# Patient Record
Sex: FEMALE | Employment: UNEMPLOYED | URBAN - METROPOLITAN AREA
[De-identification: names, ages, dates, MRNs, and addresses within clinical notes are randomized per-mention and may not be internally consistent; named-entity substitution may affect disease eponyms.]

---

## 2018-07-26 ENCOUNTER — OFFICE VISIT (OUTPATIENT)
Dept: INTERNAL MEDICINE CLINIC | Facility: CLINIC | Age: 43
End: 2018-07-26
Payer: COMMERCIAL

## 2018-07-26 VITALS
RESPIRATION RATE: 16 BRPM | HEART RATE: 62 BPM | HEIGHT: 65 IN | DIASTOLIC BLOOD PRESSURE: 76 MMHG | TEMPERATURE: 99 F | WEIGHT: 183.19 LBS | BODY MASS INDEX: 30.52 KG/M2 | SYSTOLIC BLOOD PRESSURE: 112 MMHG

## 2018-07-26 DIAGNOSIS — Z13.228 SCREENING FOR ENDOCRINE, NUTRITIONAL, METABOLIC AND IMMUNITY DISORDER: ICD-10-CM

## 2018-07-26 DIAGNOSIS — Z13.29 SCREENING FOR THYROID DISORDER: ICD-10-CM

## 2018-07-26 DIAGNOSIS — Z13.21 SCREENING FOR ENDOCRINE, NUTRITIONAL, METABOLIC AND IMMUNITY DISORDER: ICD-10-CM

## 2018-07-26 DIAGNOSIS — Z12.31 ENCOUNTER FOR SCREENING MAMMOGRAM FOR MALIGNANT NEOPLASM OF BREAST: ICD-10-CM

## 2018-07-26 DIAGNOSIS — B36.9 FUNGAL RASH OF TORSO: ICD-10-CM

## 2018-07-26 DIAGNOSIS — Z00.00 ROUTINE GENERAL MEDICAL EXAMINATION AT A HEALTH CARE FACILITY: Primary | ICD-10-CM

## 2018-07-26 DIAGNOSIS — Z13.29 SCREENING FOR ENDOCRINE, NUTRITIONAL, METABOLIC AND IMMUNITY DISORDER: ICD-10-CM

## 2018-07-26 DIAGNOSIS — Z13.0 ENCOUNTER FOR SCREENING FOR DISEASES OF THE BLOOD AND BLOOD-FORMING ORGANS AND CERTAIN DISORDERS INVOLVING THE IMMUNE MECHANISM: ICD-10-CM

## 2018-07-26 DIAGNOSIS — Z13.0 SCREENING FOR ENDOCRINE, NUTRITIONAL, METABOLIC AND IMMUNITY DISORDER: ICD-10-CM

## 2018-07-26 DIAGNOSIS — Z13.220 SCREENING FOR LIPID DISORDERS: ICD-10-CM

## 2018-07-26 PROCEDURE — 99386 PREV VISIT NEW AGE 40-64: CPT | Performed by: INTERNAL MEDICINE

## 2018-07-26 RX ORDER — CLOTRIMAZOLE AND BETAMETHASONE DIPROPIONATE 10; .64 MG/G; MG/G
CREAM TOPICAL
Qty: 60 G | Refills: 1 | Status: SHIPPED | OUTPATIENT
Start: 2018-07-26 | End: 2019-12-12

## 2018-07-26 NOTE — PROGRESS NOTES
Patient presents with:  Physical: cn room 4: physical       HPI:    Patient here for cpe without pap. Just moved from [id number] [facility] to here due to her 's job.  with 4 kids  Pap done last year and WNL.  S/p hysterectomy in 2016, ovaries still left a this patient.   Dental Visits:y  Pap:utd  Mammogram:due this year    Physical Exam  /76 (BP Location: Right arm, Patient Position: Sitting, Cuff Size: adult)   Pulse 62   Temp 98.5 °F (36.9 °C) (Oral)   Resp 16   Ht 65\"   Wt 183 lb 3.2 oz   BMI 30.49 Prescriptions Disp Refills    clotrimazole-betamethasone 1-0.05 % External Cream 60 g 1      Sig: Apply BID to rash for 2 weeks           Imaging & Consults:  [facility] SCREENING BILAT (CPT=77067)      Return if symptoms worsen or fail to improve.     There are n

## 2018-07-27 ENCOUNTER — LAB ENCOUNTER (OUTPATIENT)
Dept: LAB | Age: 43
End: 2018-07-27
Attending: INTERNAL MEDICINE
Payer: COMMERCIAL

## 2018-07-27 DIAGNOSIS — Z13.0 SCREENING FOR ENDOCRINE, NUTRITIONAL, METABOLIC AND IMMUNITY DISORDER: ICD-10-CM

## 2018-07-27 DIAGNOSIS — Z13.220 SCREENING FOR LIPID DISORDERS: ICD-10-CM

## 2018-07-27 DIAGNOSIS — Z13.228 SCREENING FOR ENDOCRINE, NUTRITIONAL, METABOLIC AND IMMUNITY DISORDER: ICD-10-CM

## 2018-07-27 DIAGNOSIS — Z13.0 ENCOUNTER FOR SCREENING FOR DISEASES OF THE BLOOD AND BLOOD-FORMING ORGANS AND CERTAIN DISORDERS INVOLVING THE IMMUNE MECHANISM: ICD-10-CM

## 2018-07-27 DIAGNOSIS — Z13.29 SCREENING FOR ENDOCRINE, NUTRITIONAL, METABOLIC AND IMMUNITY DISORDER: ICD-10-CM

## 2018-07-27 DIAGNOSIS — Z13.29 SCREENING FOR THYROID DISORDER: ICD-10-CM

## 2018-07-27 DIAGNOSIS — Z13.21 SCREENING FOR ENDOCRINE, NUTRITIONAL, METABOLIC AND IMMUNITY DISORDER: ICD-10-CM

## 2018-07-27 LAB
ALBUMIN SERPL-MCNC: 3.7 G/DL (ref 3.5–4.8)
ALBUMIN/GLOB SERPL: 0.9 {RATIO} (ref 1–2)
ALP LIVER SERPL-CCNC: 61 U/L (ref 37–98)
ALT SERPL-CCNC: 22 U/L (ref 14–54)
ANION GAP SERPL CALC-SCNC: 7 MMOL/L (ref 0–18)
AST SERPL-CCNC: 16 U/L (ref 15–41)
BASOPHILS # BLD AUTO: 0.04 X10(3) UL (ref 0–0.1)
BASOPHILS NFR BLD AUTO: 0.6 %
BILIRUB SERPL-MCNC: 0.4 MG/DL (ref 0.1–2)
BUN BLD-MCNC: 11 MG/DL (ref 8–20)
BUN/CREAT SERPL: 17.2 (ref 10–20)
CALCIUM BLD-MCNC: 9.1 MG/DL (ref 8.3–10.3)
CHLORIDE SERPL-SCNC: 108 MMOL/L (ref 101–111)
CHOLEST SMN-MCNC: 159 MG/DL (ref ?–200)
CO2 SERPL-SCNC: 27 MMOL/L (ref 22–32)
CREAT BLD-MCNC: 0.64 MG/DL (ref 0.55–1.02)
EOSINOPHIL # BLD AUTO: 0.21 X10(3) UL (ref 0–0.3)
EOSINOPHIL NFR BLD AUTO: 3 %
ERYTHROCYTE [DISTWIDTH] IN BLOOD BY AUTOMATED COUNT: 13.5 % (ref 11.5–16)
GLOBULIN PLAS-MCNC: 4.1 G/DL (ref 2.5–3.7)
GLUCOSE BLD-MCNC: 92 MG/DL (ref 70–99)
HCT VFR BLD AUTO: 39.9 % (ref 34–50)
HDLC SERPL-MCNC: 47 MG/DL (ref 40–59)
HGB BLD-MCNC: 12.6 G/DL (ref 12–16)
IMMATURE GRANULOCYTE COUNT: 0.02 X10(3) UL (ref 0–1)
IMMATURE GRANULOCYTE RATIO %: 0.3 %
LDLC SERPL CALC-MCNC: 75 MG/DL (ref ?–100)
LYMPHOCYTES # BLD AUTO: 2.43 X10(3) UL (ref 0.9–4)
LYMPHOCYTES NFR BLD AUTO: 34.3 %
M PROTEIN MFR SERPL ELPH: 7.8 G/DL (ref 6.1–8.3)
MCH RBC QN AUTO: 27 PG (ref 27–33.2)
MCHC RBC AUTO-ENTMCNC: 31.6 G/DL (ref 31–37)
MCV RBC AUTO: 85.6 FL (ref 81–100)
MONOCYTES # BLD AUTO: 0.49 X10(3) UL (ref 0.1–1)
MONOCYTES NFR BLD AUTO: 6.9 %
NEUTROPHIL ABS PRELIM: 3.89 X10 (3) UL (ref 1.3–6.7)
NEUTROPHILS # BLD AUTO: 3.89 X10(3) UL (ref 1.3–6.7)
NEUTROPHILS NFR BLD AUTO: 54.9 %
NONHDLC SERPL-MCNC: 112 MG/DL (ref ?–130)
OSMOLALITY SERPL CALC.SUM OF ELEC: 293 MOSM/KG (ref 275–295)
PLATELET # BLD AUTO: 412 10(3)UL (ref 150–450)
POTASSIUM SERPL-SCNC: 4.4 MMOL/L (ref 3.6–5.1)
RBC # BLD AUTO: 4.66 X10(6)UL (ref 3.8–5.1)
RED CELL DISTRIBUTION WIDTH-SD: 41.5 FL (ref 35.1–46.3)
SODIUM SERPL-SCNC: 142 MMOL/L (ref 136–144)
TRIGL SERPL-MCNC: 186 MG/DL (ref 30–149)
TSI SER-ACNC: 2.61 MIU/ML (ref 0.35–5.5)
VLDLC SERPL CALC-MCNC: 37 MG/DL (ref 0–30)
WBC # BLD AUTO: 7.1 X10(3) UL (ref 4–13)

## 2018-07-27 PROCEDURE — 80061 LIPID PANEL: CPT

## 2018-07-27 PROCEDURE — 84443 ASSAY THYROID STIM HORMONE: CPT

## 2018-07-27 PROCEDURE — 80053 COMPREHEN METABOLIC PANEL: CPT

## 2018-07-27 PROCEDURE — 85025 COMPLETE CBC W/AUTO DIFF WBC: CPT

## 2018-07-29 ENCOUNTER — TELEPHONE (OUTPATIENT)
Dept: INTERNAL MEDICINE CLINIC | Facility: CLINIC | Age: 43
End: 2018-07-29

## 2018-07-29 DIAGNOSIS — R77.1 ELEVATED SERUM GLOBULIN LEVEL: Primary | ICD-10-CM

## 2018-07-30 ENCOUNTER — MED REC SCAN ONLY (OUTPATIENT)
Dept: INTERNAL MEDICINE CLINIC | Facility: CLINIC | Age: 43
End: 2018-07-30

## 2019-03-26 ENCOUNTER — TELEPHONE (OUTPATIENT)
Dept: INTERNAL MEDICINE CLINIC | Facility: CLINIC | Age: 44
End: 2019-03-26

## 2019-12-12 ENCOUNTER — OFFICE VISIT (OUTPATIENT)
Dept: INTERNAL MEDICINE CLINIC | Facility: CLINIC | Age: 44
End: 2019-12-12
Payer: COMMERCIAL

## 2019-12-12 ENCOUNTER — LAB ENCOUNTER (OUTPATIENT)
Dept: LAB | Age: 44
End: 2019-12-12
Attending: INTERNAL MEDICINE
Payer: COMMERCIAL

## 2019-12-12 VITALS
SYSTOLIC BLOOD PRESSURE: 102 MMHG | HEART RATE: 64 BPM | TEMPERATURE: 99 F | WEIGHT: 155.81 LBS | HEIGHT: 64.75 IN | RESPIRATION RATE: 16 BRPM | BODY MASS INDEX: 26.28 KG/M2 | DIASTOLIC BLOOD PRESSURE: 74 MMHG

## 2019-12-12 DIAGNOSIS — Z13.228 SCREENING FOR ENDOCRINE, NUTRITIONAL, METABOLIC AND IMMUNITY DISORDER: ICD-10-CM

## 2019-12-12 DIAGNOSIS — Z13.21 SCREENING FOR ENDOCRINE, NUTRITIONAL, METABOLIC AND IMMUNITY DISORDER: ICD-10-CM

## 2019-12-12 DIAGNOSIS — Z13.0 SCREENING FOR DISORDER OF BLOOD AND BLOOD-FORMING ORGANS: ICD-10-CM

## 2019-12-12 DIAGNOSIS — Z13.29 SCREENING FOR ENDOCRINE, NUTRITIONAL, METABOLIC AND IMMUNITY DISORDER: ICD-10-CM

## 2019-12-12 DIAGNOSIS — Z13.29 SCREENING FOR THYROID DISORDER: ICD-10-CM

## 2019-12-12 DIAGNOSIS — Z13.220 SCREENING FOR LIPID DISORDERS: ICD-10-CM

## 2019-12-12 DIAGNOSIS — Z00.00 ROUTINE GENERAL MEDICAL EXAMINATION AT A HEALTH CARE FACILITY: Primary | ICD-10-CM

## 2019-12-12 DIAGNOSIS — Z23 NEED FOR TDAP VACCINATION: ICD-10-CM

## 2019-12-12 DIAGNOSIS — Z13.0 SCREENING FOR ENDOCRINE, NUTRITIONAL, METABOLIC AND IMMUNITY DISORDER: ICD-10-CM

## 2019-12-12 DIAGNOSIS — G43.109 MIGRAINE WITH AURA AND WITHOUT STATUS MIGRAINOSUS, NOT INTRACTABLE: ICD-10-CM

## 2019-12-12 DIAGNOSIS — Z12.31 ENCOUNTER FOR SCREENING MAMMOGRAM FOR MALIGNANT NEOPLASM OF BREAST: ICD-10-CM

## 2019-12-12 DIAGNOSIS — Z23 NEED FOR INFLUENZA VACCINATION: ICD-10-CM

## 2019-12-12 PROCEDURE — 90472 IMMUNIZATION ADMIN EACH ADD: CPT | Performed by: INTERNAL MEDICINE

## 2019-12-12 PROCEDURE — 99396 PREV VISIT EST AGE 40-64: CPT | Performed by: INTERNAL MEDICINE

## 2019-12-12 PROCEDURE — 90715 TDAP VACCINE 7 YRS/> IM: CPT | Performed by: INTERNAL MEDICINE

## 2019-12-12 PROCEDURE — 80061 LIPID PANEL: CPT | Performed by: INTERNAL MEDICINE

## 2019-12-12 PROCEDURE — 80050 GENERAL HEALTH PANEL: CPT | Performed by: INTERNAL MEDICINE

## 2019-12-12 PROCEDURE — 90471 IMMUNIZATION ADMIN: CPT | Performed by: INTERNAL MEDICINE

## 2019-12-12 PROCEDURE — 90686 IIV4 VACC NO PRSV 0.5 ML IM: CPT | Performed by: INTERNAL MEDICINE

## 2019-12-12 RX ORDER — SUMATRIPTAN 25 MG/1
TABLET, FILM COATED ORAL
Qty: 9 TABLET | Refills: 1 | Status: SHIPPED | OUTPATIENT
Start: 2019-12-12 | End: 2021-04-03

## 2019-12-12 NOTE — PROGRESS NOTES
Patient presents with:  Physical: cn room 3: physical       HPI:    Patient here for CPE.  with 4 kids  S/p hysterectomy several years ago for bleeding fibroids, still with ovaries.  Due for mammogram  Td 2008, due for tdap this year and influenza va • Diabetes Paternal Grandfather      Social History    Tobacco Use      Smoking status: Never Smoker      Smokeless tobacco: Never Used    Alcohol use: No    Drug use: No      Current Outpatient Medications   Medication Sig Dispense Refill   • SUMAtripta Psychiatric: Normal mood and affect.      A/P:      Routine general medical examination at a health care facility- referred for mammogram, no pap (s/p hysterectomy), check CPE labs, influenza and tdap vaccines given today  Need for tdap vaccination  Joelle Nolasco

## 2020-08-21 ENCOUNTER — OFFICE VISIT (OUTPATIENT)
Dept: FAMILY MEDICINE CLINIC | Facility: CLINIC | Age: 45
End: 2020-08-21
Payer: COMMERCIAL

## 2020-08-21 ENCOUNTER — TELEPHONE (OUTPATIENT)
Dept: FAMILY MEDICINE CLINIC | Facility: CLINIC | Age: 45
End: 2020-08-21

## 2020-08-21 VITALS
DIASTOLIC BLOOD PRESSURE: 76 MMHG | HEART RATE: 86 BPM | BODY MASS INDEX: 27 KG/M2 | TEMPERATURE: 99 F | WEIGHT: 168 LBS | HEIGHT: 66 IN | RESPIRATION RATE: 16 BRPM | OXYGEN SATURATION: 98 % | SYSTOLIC BLOOD PRESSURE: 119 MMHG

## 2020-08-21 DIAGNOSIS — H60.501 ACUTE OTITIS EXTERNA OF RIGHT EAR, UNSPECIFIED TYPE: Primary | ICD-10-CM

## 2020-08-21 PROCEDURE — 3008F BODY MASS INDEX DOCD: CPT | Performed by: NURSE PRACTITIONER

## 2020-08-21 PROCEDURE — 3074F SYST BP LT 130 MM HG: CPT | Performed by: NURSE PRACTITIONER

## 2020-08-21 PROCEDURE — 3078F DIAST BP <80 MM HG: CPT | Performed by: NURSE PRACTITIONER

## 2020-08-21 PROCEDURE — 99213 OFFICE O/P EST LOW 20 MIN: CPT | Performed by: NURSE PRACTITIONER

## 2020-08-21 RX ORDER — OFLOXACIN 3 MG/ML
10 SOLUTION AURICULAR (OTIC) DAILY
Qty: 1 BOTTLE | Refills: 0 | Status: SHIPPED | OUTPATIENT
Start: 2020-08-21 | End: 2020-08-28

## 2020-08-21 RX ORDER — CIPROFLOXACIN AND DEXAMETHASONE 3; 1 MG/ML; MG/ML
4 SUSPENSION/ DROPS AURICULAR (OTIC) 2 TIMES DAILY
Qty: 1 BOTTLE | Refills: 0 | Status: SHIPPED | OUTPATIENT
Start: 2020-08-21 | End: 2020-08-21 | Stop reason: ALTCHOICE

## 2020-08-21 NOTE — TELEPHONE ENCOUNTER
Pt called and was informed by pharmacy they are out of ciprodex and she would have to go to [facility] location for pickup. Requested alternate therapy so she could  at [facility] and [facility] location. Will switch to Ofloxacin today.  We discussed new dir

## 2020-08-21 NOTE — PATIENT INSTRUCTIONS
1. Rest. Drink plenty of fluids. 2. Ciprodex ear drops as presribed. 3. Tylenol/Ibuprofen as directed for pain. 4. No swimming until completely resolved.    5. Follow up with your PMD in 2 days for reeval. Follow up sooner or go to the ED if symptoms ear drops right away. You can get these drops over the counter at most drugstores. They work by removing water from the ear canal.  Follow-up care  Follow up with your healthcare provider in 1 week, or as advised.   When to seek medical advice  Call your he

## 2020-08-21 NOTE — PROGRESS NOTES
CHIEF COMPLAINT:   Patient presents with:  Ear Pain      HPI:   Juanita Birmingham  is a non-toxic, well appearing 45year old female who presents today with complaints of right ear pain.   Notes it has been present since last night and notes some whitis EYES: conjunctiva clear, EOM intact  EARS: Right TM: intact and without erythema, no bulging, noretraction,noeffusion.  There is erythema, white discharge, and swelling noted to ear canal.  There is pain with manipulation of tragus or auricle and with inser 3. Tylenol/Ibuprofen as directed for pain. 4. No swimming until completely resolved. 5. Follow up with your PMD in 2 days for reeval. Follow up sooner or go to the ED if symptoms worsen, change or if you have any concerns.          External Ear Infectio · If you feel water trapped in your ear, use ear drops right away. You can get these drops over the counter at most drugstores.  They work by removing water from the ear canal.  Follow-up care  Follow up with your healthcare provider in 1 week, or as advise

## 2021-03-03 ENCOUNTER — TELEPHONE (OUTPATIENT)
Dept: INTERNAL MEDICINE CLINIC | Facility: CLINIC | Age: 46
End: 2021-03-03

## 2021-03-03 DIAGNOSIS — Z00.00 ROUTINE GENERAL MEDICAL EXAMINATION AT A HEALTH CARE FACILITY: Primary | ICD-10-CM

## 2021-03-03 DIAGNOSIS — Z13.220 SCREENING FOR LIPID DISORDERS: ICD-10-CM

## 2021-03-03 DIAGNOSIS — Z13.228 SCREENING FOR METABOLIC DISORDER: ICD-10-CM

## 2021-03-03 DIAGNOSIS — Z13.0 SCREENING FOR BLOOD DISEASE: ICD-10-CM

## 2021-03-03 DIAGNOSIS — Z13.29 SCREENING FOR THYROID DISORDER: ICD-10-CM

## 2021-03-03 NOTE — TELEPHONE ENCOUNTER
Future Appointments   Date Time Provider Talat Cui   4/3/2021 11:20 AM Sofia Farley MD [facility]     Orders to edward- Pt informed that labs need to be completed no sooner than 2 weeks prior to the appt.  Pt aware to fast-no call back

## 2021-04-03 ENCOUNTER — OFFICE VISIT (OUTPATIENT)
Dept: INTERNAL MEDICINE CLINIC | Facility: CLINIC | Age: 46
End: 2021-04-03
Payer: COMMERCIAL

## 2021-04-03 ENCOUNTER — LAB ENCOUNTER (OUTPATIENT)
Dept: LAB | Facility: HOSPITAL | Age: 46
End: 2021-04-03
Attending: INTERNAL MEDICINE
Payer: COMMERCIAL

## 2021-04-03 VITALS
WEIGHT: 160 LBS | TEMPERATURE: 98 F | HEART RATE: 60 BPM | DIASTOLIC BLOOD PRESSURE: 64 MMHG | OXYGEN SATURATION: 98 % | HEIGHT: 66 IN | BODY MASS INDEX: 25.71 KG/M2 | SYSTOLIC BLOOD PRESSURE: 92 MMHG

## 2021-04-03 DIAGNOSIS — Z12.31 ENCOUNTER FOR SCREENING MAMMOGRAM FOR MALIGNANT NEOPLASM OF BREAST: ICD-10-CM

## 2021-04-03 DIAGNOSIS — G43.109 MIGRAINE WITH AURA AND WITHOUT STATUS MIGRAINOSUS, NOT INTRACTABLE: ICD-10-CM

## 2021-04-03 DIAGNOSIS — Z00.00 ROUTINE GENERAL MEDICAL EXAMINATION AT A HEALTH CARE FACILITY: ICD-10-CM

## 2021-04-03 DIAGNOSIS — Z13.228 SCREENING FOR METABOLIC DISORDER: ICD-10-CM

## 2021-04-03 DIAGNOSIS — Z13.0 SCREENING FOR BLOOD DISEASE: ICD-10-CM

## 2021-04-03 DIAGNOSIS — B35.1 ONYCHOMYCOSIS: ICD-10-CM

## 2021-04-03 DIAGNOSIS — Z13.29 SCREENING FOR THYROID DISORDER: ICD-10-CM

## 2021-04-03 DIAGNOSIS — Z00.00 ROUTINE GENERAL MEDICAL EXAMINATION AT A HEALTH CARE FACILITY: Primary | ICD-10-CM

## 2021-04-03 DIAGNOSIS — Z13.220 SCREENING FOR LIPID DISORDERS: ICD-10-CM

## 2021-04-03 DIAGNOSIS — Z88.9 MULTIPLE ALLERGIES: ICD-10-CM

## 2021-04-03 PROCEDURE — 80061 LIPID PANEL: CPT

## 2021-04-03 PROCEDURE — 36415 COLL VENOUS BLD VENIPUNCTURE: CPT

## 2021-04-03 PROCEDURE — 3074F SYST BP LT 130 MM HG: CPT | Performed by: INTERNAL MEDICINE

## 2021-04-03 PROCEDURE — 85025 COMPLETE CBC W/AUTO DIFF WBC: CPT

## 2021-04-03 PROCEDURE — 84443 ASSAY THYROID STIM HORMONE: CPT

## 2021-04-03 PROCEDURE — 99396 PREV VISIT EST AGE 40-64: CPT | Performed by: INTERNAL MEDICINE

## 2021-04-03 PROCEDURE — 3008F BODY MASS INDEX DOCD: CPT | Performed by: INTERNAL MEDICINE

## 2021-04-03 PROCEDURE — 3078F DIAST BP <80 MM HG: CPT | Performed by: INTERNAL MEDICINE

## 2021-04-03 PROCEDURE — 80053 COMPREHEN METABOLIC PANEL: CPT

## 2021-04-03 RX ORDER — SUMATRIPTAN 25 MG/1
TABLET, FILM COATED ORAL
Qty: 9 TABLET | Refills: 1 | Status: SHIPPED | OUTPATIENT
Start: 2021-04-03 | End: 2022-01-05

## 2021-04-03 NOTE — PROGRESS NOTES
Patient presents with:  Physical: LM rm 4      HPI:    Patient is  with kids, doing well. She came fasting and will do labs today.   Diet- healthy, stays active  Migraines respond well to sumatriptan, needs refills  Suffers from multiple allergies, Diabetes Maternal Grandmother    • Hypertension Maternal Grandmother    • Diabetes Maternal Grandfather    • Hypertension Maternal Grandfather    • Heart Disorder Maternal Grandfather         heart attack   • Diabetes Paternal Grandmother    • Diabetes Alfredo Mcintosh rales  Abdominal: Soft. Bowel sounds are normal. Non tender, no masses, no organomegaly or hernias. Musculoskeletal: Normal range of motion. No edema and no tenderness. No effusions.   EXT: left foot 3 rd toenail with mild fungal infection  Lymphadenopath

## 2021-04-08 ENCOUNTER — HOSPITAL ENCOUNTER (OUTPATIENT)
Dept: MAMMOGRAPHY | Age: 46
Discharge: HOME | End: 2021-04-08
Attending: INTERNAL MEDICINE
Payer: COMMERCIAL

## 2021-04-08 DIAGNOSIS — Z12.31 ENCOUNTER FOR SCREENING MAMMOGRAM FOR MALIGNANT NEOPLASM OF BREAST: ICD-10-CM

## 2021-04-08 PROCEDURE — 77067 SCR MAMMO BI INCL CAD: CPT | Performed by: INTERNAL MEDICINE

## 2021-04-14 ENCOUNTER — TELEPHONE (OUTPATIENT)
Dept: INTERNAL MEDICINE CLINIC | Facility: CLINIC | Age: 46
End: 2021-04-14

## 2021-04-14 NOTE — TELEPHONE ENCOUNTER
Received notification that Ciclopirox 8% solution not covered by ins. FWD to TB, please advise if proceed with PA.     Key [id number]

## 2021-04-16 NOTE — TELEPHONE ENCOUNTER
Paperwork filled out and faxed to insurance  Confirmation received, sent to scanning. [employer] for response.

## 2021-04-23 NOTE — TELEPHONE ENCOUNTER
Response received from [employer] stating that this medication is not processed via this service. Advised to contact [employer] for the PA. Contacted [employer] to initiate PA, but states they could not find member.   No insurance on file in E

## 2021-04-26 NOTE — TELEPHONE ENCOUNTER
Spoke with pt, relays that Rx coverage is with [employer]. Paperwork filled out and faxed to insurance. [employer] for response.

## 2021-08-31 ENCOUNTER — TELEPHONE (OUTPATIENT)
Dept: INTERNAL MEDICINE CLINIC | Facility: CLINIC | Age: 46
End: 2021-08-31

## 2021-08-31 NOTE — TELEPHONE ENCOUNTER
Pt made appt on line for ADD and anxiety in only a 10 min appt-I called her back to r/s and she said she really wants to see TB to discuss going on meds for ADD and anxiety but nothing available soon w/TB-please call to triage to see when you can sched her

## 2021-09-01 NOTE — TELEPHONE ENCOUNTER
Spoke to pt and scheduled appt   Future Appointments   Date Time Provider Talat Kimi   9/2/2021  2:00 PM Jayro Fan MD [facility]

## 2021-09-02 PROBLEM — F32.A ANXIETY AND DEPRESSION: Status: ACTIVE | Noted: 2021-09-02

## 2021-09-02 PROBLEM — F41.9 ANXIETY AND DEPRESSION: Status: ACTIVE | Noted: 2021-09-02

## 2021-09-02 NOTE — PROGRESS NOTES
Franchesca Obando  is a 39year old female. Patient presents with: Anxiety  ADD      HPI:     C/o depressed mood for over a year. Started after her 23year old left the house suddenly with a boyfriend.  Relationship with her daughter is better now but p Grandfather         heart attack   • Diabetes Paternal Grandmother    • Diabetes Paternal Grandfather    • Cancer Maternal Aunt 62        lung cancer        Allergies    Shrimp                  TONGUE SWELLING      REVIEW OF SYSTEMS:   GENERAL HEALTH:  no

## 2021-10-08 ENCOUNTER — MED REC SCAN ONLY (OUTPATIENT)
Dept: INTERNAL MEDICINE CLINIC | Facility: CLINIC | Age: 46
End: 2021-10-08

## 2021-10-14 PROBLEM — F90.0 ATTENTION DEFICIT HYPERACTIVITY DISORDER (ADHD), PREDOMINANTLY INATTENTIVE TYPE: Status: ACTIVE | Noted: 2021-10-14

## 2021-10-14 NOTE — PROGRESS NOTES
Salena Koch  is a 39year old female. Patient presents with:   Follow - Up: ES rm - 4 f/u for anxiety and ADD      HPI:     Patient is here for follow up-  Anxiety symptoms are much better on bupropion 150mg daily, no SE reported  Having less frequ used    Alcohol use: No    Drug use: No    Family History   Problem Relation Age of Onset   • Diabetes Mother    • Hypertension Mother    • Lipids Mother    • Other (Other) Mother         thyroid disease   • Diabetes Maternal Grandmother    • Hypertension aura and without status migrainosus, not intractable- doing well, CPM      No orders of the defined types were placed in this encounter.       Meds & Refills for this Visit:  Requested Prescriptions     Signed Prescriptions Disp Refills   • Amphetamine-Dext

## 2021-10-20 ENCOUNTER — TELEPHONE (OUTPATIENT)
Dept: INTERNAL MEDICINE CLINIC | Facility: CLINIC | Age: 46
End: 2021-10-20

## 2021-10-20 NOTE — TELEPHONE ENCOUNTER
Pharmacy stated that they faxed us a PA on 10-18 for med below, please advise if we have received or can we start it? ?       Amphetamine-Dextroamphet ER (ADDERALL XR) 10 MG Oral Capsule SR 24 Hr 30 capsule 0

## 2021-10-21 NOTE — TELEPHONE ENCOUNTER
Pt stated she's checking status on below medication and has been waiting since 10/14. Pt is at pharmacy and wants a call back.  Pt's phone is [phone] and would like a call back high TE

## 2021-10-27 NOTE — TELEPHONE ENCOUNTER
Authorization received from [facility] for Adderall  Pt notified, approval sent for scanning

## 2022-01-05 DIAGNOSIS — G43.109 MIGRAINE WITH AURA AND WITHOUT STATUS MIGRAINOSUS, NOT INTRACTABLE: ICD-10-CM

## 2022-01-05 RX ORDER — SUMATRIPTAN 25 MG/1
TABLET, FILM COATED ORAL
Qty: 9 TABLET | Refills: 1 | Status: SHIPPED | OUTPATIENT
Start: 2022-01-05

## 2022-03-21 RX ORDER — BUPROPION HYDROCHLORIDE 150 MG/1
TABLET ORAL
Qty: 90 TABLET | Refills: 1 | Status: SHIPPED | OUTPATIENT
Start: 2022-03-21

## 2022-03-21 NOTE — TELEPHONE ENCOUNTER
Last VISIT - 10/14/21 Anxiety f/u    Last CPE - 4/3/21    Last REFILL -   . buPROPion 150 MG Oral Tablet 24 Hr 90 tablet 1 10/14/2021      Last LABS - 4/3/21 cbc, cmp, lipid, tsh    No future appointments. Per PROTOCOL? None    Please Approve or Deny.

## 2022-04-22 ENCOUNTER — TELEPHONE (OUTPATIENT)
Dept: INTERNAL MEDICINE CLINIC | Facility: CLINIC | Age: 47
End: 2022-04-22

## 2022-04-22 DIAGNOSIS — Z13.0 SCREENING FOR BLOOD DISEASE: ICD-10-CM

## 2022-04-22 DIAGNOSIS — Z13.29 SCREENING FOR THYROID DISORDER: ICD-10-CM

## 2022-04-22 DIAGNOSIS — Z13.220 SCREENING FOR LIPID DISORDERS: ICD-10-CM

## 2022-04-22 DIAGNOSIS — Z00.00 ROUTINE GENERAL MEDICAL EXAMINATION AT A HEALTH CARE FACILITY: Primary | ICD-10-CM

## 2022-04-22 DIAGNOSIS — Z13.228 SCREENING FOR METABOLIC DISORDER: ICD-10-CM

## 2022-04-22 NOTE — TELEPHONE ENCOUNTER
Orders to [address] Pt aware to get labs done no sooner than 2 weeks prior to the appt. Pt aware to fast.  No call back required.   Future Appointments   Date Time Provider Talat Cui   6/28/2022  1:40 PM Colin Torres MD [facility]

## 2022-06-11 DIAGNOSIS — G43.109 MIGRAINE WITH AURA AND WITHOUT STATUS MIGRAINOSUS, NOT INTRACTABLE: ICD-10-CM

## 2022-06-14 RX ORDER — SUMATRIPTAN 25 MG/1
TABLET, FILM COATED ORAL
Qty: 9 TABLET | Refills: 1 | Status: SHIPPED | OUTPATIENT
Start: 2022-06-14

## 2022-06-28 ENCOUNTER — OFFICE VISIT (OUTPATIENT)
Dept: INTERNAL MEDICINE CLINIC | Facility: CLINIC | Age: 47
End: 2022-06-28
Payer: COMMERCIAL

## 2022-06-28 VITALS
DIASTOLIC BLOOD PRESSURE: 82 MMHG | HEIGHT: 66 IN | HEART RATE: 68 BPM | WEIGHT: 174 LBS | BODY MASS INDEX: 27.97 KG/M2 | SYSTOLIC BLOOD PRESSURE: 112 MMHG | TEMPERATURE: 98 F

## 2022-06-28 DIAGNOSIS — F90.0 ATTENTION DEFICIT HYPERACTIVITY DISORDER (ADHD), PREDOMINANTLY INATTENTIVE TYPE: ICD-10-CM

## 2022-06-28 DIAGNOSIS — F32.A ANXIETY AND DEPRESSION: ICD-10-CM

## 2022-06-28 DIAGNOSIS — F41.9 ANXIETY AND DEPRESSION: ICD-10-CM

## 2022-06-28 DIAGNOSIS — Z12.31 ENCOUNTER FOR SCREENING MAMMOGRAM FOR MALIGNANT NEOPLASM OF BREAST: ICD-10-CM

## 2022-06-28 DIAGNOSIS — Z00.00 ROUTINE GENERAL MEDICAL EXAMINATION AT A HEALTH CARE FACILITY: Primary | ICD-10-CM

## 2022-06-28 DIAGNOSIS — G43.109 MIGRAINE WITH AURA AND WITHOUT STATUS MIGRAINOSUS, NOT INTRACTABLE: ICD-10-CM

## 2022-06-28 DIAGNOSIS — L65.9 HAIR THINNING: ICD-10-CM

## 2022-06-28 PROCEDURE — 3079F DIAST BP 80-89 MM HG: CPT | Performed by: INTERNAL MEDICINE

## 2022-06-28 PROCEDURE — 3008F BODY MASS INDEX DOCD: CPT | Performed by: INTERNAL MEDICINE

## 2022-06-28 PROCEDURE — 3074F SYST BP LT 130 MM HG: CPT | Performed by: INTERNAL MEDICINE

## 2022-06-28 PROCEDURE — 99396 PREV VISIT EST AGE 40-64: CPT | Performed by: INTERNAL MEDICINE

## 2022-06-28 RX ORDER — DEXTROAMPHETAMINE SACCHARATE, AMPHETAMINE ASPARTATE MONOHYDRATE, DEXTROAMPHETAMINE SULFATE AND AMPHETAMINE SULFATE 3.75; 3.75; 3.75; 3.75 MG/1; MG/1; MG/1; MG/1
15 CAPSULE, EXTENDED RELEASE ORAL DAILY
Qty: 30 CAPSULE | Refills: 0 | Status: SHIPPED | OUTPATIENT
Start: 2022-08-29 | End: 2022-09-28

## 2022-06-28 RX ORDER — DEXTROAMPHETAMINE SACCHARATE, AMPHETAMINE ASPARTATE MONOHYDRATE, DEXTROAMPHETAMINE SULFATE AND AMPHETAMINE SULFATE 3.75; 3.75; 3.75; 3.75 MG/1; MG/1; MG/1; MG/1
15 CAPSULE, EXTENDED RELEASE ORAL DAILY
Qty: 30 CAPSULE | Refills: 0 | Status: SHIPPED | OUTPATIENT
Start: 2022-07-29 | End: 2022-08-28

## 2022-06-28 RX ORDER — DEXTROAMPHETAMINE SACCHARATE, AMPHETAMINE ASPARTATE MONOHYDRATE, DEXTROAMPHETAMINE SULFATE AND AMPHETAMINE SULFATE 3.75; 3.75; 3.75; 3.75 MG/1; MG/1; MG/1; MG/1
15 CAPSULE, EXTENDED RELEASE ORAL DAILY
Qty: 30 CAPSULE | Refills: 0 | Status: SHIPPED | OUTPATIENT
Start: 2022-06-28 | End: 2022-07-28

## 2022-06-28 RX ORDER — DEXTROAMPHETAMINE SACCHARATE, AMPHETAMINE ASPARTATE MONOHYDRATE, DEXTROAMPHETAMINE SULFATE AND AMPHETAMINE SULFATE 2.5; 2.5; 2.5; 2.5 MG/1; MG/1; MG/1; MG/1
10 CAPSULE, EXTENDED RELEASE ORAL DAILY
Qty: 30 CAPSULE | Refills: 0 | Status: CANCELLED | OUTPATIENT
Start: 2022-06-28 | End: 2022-07-28

## 2022-06-28 RX ORDER — BUPROPION HYDROCHLORIDE 300 MG/1
300 TABLET ORAL DAILY
Qty: 90 TABLET | Refills: 1 | Status: SHIPPED | OUTPATIENT
Start: 2022-06-28

## 2022-06-28 RX ORDER — SUMATRIPTAN 25 MG/1
TABLET, FILM COATED ORAL
Qty: 9 TABLET | Refills: 1 | Status: SHIPPED | OUTPATIENT
Start: 2022-06-28

## 2022-06-29 ENCOUNTER — LAB ENCOUNTER (OUTPATIENT)
Dept: LAB | Age: 47
End: 2022-06-29
Attending: INTERNAL MEDICINE
Payer: COMMERCIAL

## 2022-06-29 DIAGNOSIS — Z00.00 ROUTINE GENERAL MEDICAL EXAMINATION AT A HEALTH CARE FACILITY: ICD-10-CM

## 2022-06-29 DIAGNOSIS — Z13.0 SCREENING FOR BLOOD DISEASE: ICD-10-CM

## 2022-06-29 DIAGNOSIS — Z13.29 SCREENING FOR THYROID DISORDER: ICD-10-CM

## 2022-06-29 DIAGNOSIS — Z13.220 SCREENING FOR LIPID DISORDERS: ICD-10-CM

## 2022-06-29 DIAGNOSIS — Z13.228 SCREENING FOR METABOLIC DISORDER: ICD-10-CM

## 2022-06-29 LAB
ALBUMIN SERPL-MCNC: 3.6 G/DL (ref 3.4–5)
ALBUMIN/GLOB SERPL: 0.9 {RATIO} (ref 1–2)
ALP LIVER SERPL-CCNC: 58 U/L
ALT SERPL-CCNC: 15 U/L
ANION GAP SERPL CALC-SCNC: 6 MMOL/L (ref 0–18)
AST SERPL-CCNC: 13 U/L (ref 15–37)
BASOPHILS # BLD AUTO: 0.06 X10(3) UL (ref 0–0.2)
BASOPHILS NFR BLD AUTO: 0.7 %
BILIRUB SERPL-MCNC: 0.5 MG/DL (ref 0.1–2)
BUN BLD-MCNC: 9 MG/DL (ref 7–18)
CALCIUM BLD-MCNC: 9.4 MG/DL (ref 8.5–10.1)
CHLORIDE SERPL-SCNC: 107 MMOL/L (ref 98–112)
CHOLEST SERPL-MCNC: 184 MG/DL (ref ?–200)
CO2 SERPL-SCNC: 25 MMOL/L (ref 21–32)
CREAT BLD-MCNC: 0.81 MG/DL
EOSINOPHIL # BLD AUTO: 0.23 X10(3) UL (ref 0–0.7)
EOSINOPHIL NFR BLD AUTO: 2.7 %
ERYTHROCYTE [DISTWIDTH] IN BLOOD BY AUTOMATED COUNT: 13 %
FASTING PATIENT LIPID ANSWER: YES
FASTING STATUS PATIENT QL REPORTED: YES
GLOBULIN PLAS-MCNC: 3.9 G/DL (ref 2.8–4.4)
GLUCOSE BLD-MCNC: 92 MG/DL (ref 70–99)
HCT VFR BLD AUTO: 41.1 %
HDLC SERPL-MCNC: 58 MG/DL (ref 40–59)
HGB BLD-MCNC: 13.1 G/DL
IMM GRANULOCYTES # BLD AUTO: 0.04 X10(3) UL (ref 0–1)
IMM GRANULOCYTES NFR BLD: 0.5 %
LDLC SERPL CALC-MCNC: 97 MG/DL (ref ?–100)
LYMPHOCYTES # BLD AUTO: 2.75 X10(3) UL (ref 1–4)
LYMPHOCYTES NFR BLD AUTO: 32.8 %
MCH RBC QN AUTO: 27.6 PG (ref 26–34)
MCHC RBC AUTO-ENTMCNC: 31.9 G/DL (ref 31–37)
MCV RBC AUTO: 86.5 FL
MONOCYTES # BLD AUTO: 0.74 X10(3) UL (ref 0.1–1)
MONOCYTES NFR BLD AUTO: 8.8 %
NEUTROPHILS # BLD AUTO: 4.56 X10 (3) UL (ref 1.5–7.7)
NEUTROPHILS # BLD AUTO: 4.56 X10(3) UL (ref 1.5–7.7)
NEUTROPHILS NFR BLD AUTO: 54.5 %
NONHDLC SERPL-MCNC: 126 MG/DL (ref ?–130)
OSMOLALITY SERPL CALC.SUM OF ELEC: 284 MOSM/KG (ref 275–295)
PLATELET # BLD AUTO: 360 10(3)UL (ref 150–450)
POTASSIUM SERPL-SCNC: 4.3 MMOL/L (ref 3.5–5.1)
PROT SERPL-MCNC: 7.5 G/DL (ref 6.4–8.2)
RBC # BLD AUTO: 4.75 X10(6)UL
SODIUM SERPL-SCNC: 138 MMOL/L (ref 136–145)
T4 FREE SERPL-MCNC: 1 NG/DL (ref 0.8–1.7)
TRIGL SERPL-MCNC: 170 MG/DL (ref 30–149)
TSI SER-ACNC: 3.86 MIU/ML (ref 0.36–3.74)
VLDLC SERPL CALC-MCNC: 28 MG/DL (ref 0–30)
WBC # BLD AUTO: 8.4 X10(3) UL (ref 4–11)

## 2022-06-29 PROCEDURE — 84443 ASSAY THYROID STIM HORMONE: CPT

## 2022-06-29 PROCEDURE — 36415 COLL VENOUS BLD VENIPUNCTURE: CPT

## 2022-06-29 PROCEDURE — 80061 LIPID PANEL: CPT

## 2022-06-29 PROCEDURE — 85025 COMPLETE CBC W/AUTO DIFF WBC: CPT

## 2022-06-29 PROCEDURE — 80053 COMPREHEN METABOLIC PANEL: CPT

## 2022-06-29 PROCEDURE — 84439 ASSAY OF FREE THYROXINE: CPT

## 2022-06-30 ENCOUNTER — TELEPHONE (OUTPATIENT)
Dept: INTERNAL MEDICINE CLINIC | Facility: CLINIC | Age: 47
End: 2022-06-30

## 2022-06-30 DIAGNOSIS — R79.89 ABNORMAL TSH: Primary | ICD-10-CM

## 2022-10-15 ENCOUNTER — LAB ENCOUNTER (OUTPATIENT)
Dept: LAB | Facility: HOSPITAL | Age: 47
End: 2022-10-15
Attending: INTERNAL MEDICINE
Payer: COMMERCIAL

## 2022-10-15 DIAGNOSIS — R79.89 ABNORMAL TSH: ICD-10-CM

## 2022-10-15 LAB
T4 FREE SERPL-MCNC: 0.9 NG/DL (ref 0.8–1.7)
TSI SER-ACNC: 2.25 MIU/ML (ref 0.36–3.74)

## 2022-10-15 PROCEDURE — 84443 ASSAY THYROID STIM HORMONE: CPT

## 2022-10-15 PROCEDURE — 84439 ASSAY OF FREE THYROXINE: CPT

## 2022-10-15 PROCEDURE — 36415 COLL VENOUS BLD VENIPUNCTURE: CPT

## 2022-11-29 ENCOUNTER — TELEPHONE (OUTPATIENT)
Dept: INTERNAL MEDICINE CLINIC | Facility: CLINIC | Age: 47
End: 2022-11-29

## 2022-11-29 NOTE — TELEPHONE ENCOUNTER
LOV 6/28/22 with TB. Future appt scheduled for 12/20/22 with TB. Mammogram already ordered and scheduled:    [facility] 2D+3D SCREENING BILAT (CPT=77067/24712) ([id number]) [[id number]] (Order [id number])    Scheduled for 12/13/22.

## 2022-12-19 DIAGNOSIS — G43.109 MIGRAINE WITH AURA AND WITHOUT STATUS MIGRAINOSUS, NOT INTRACTABLE: ICD-10-CM

## 2022-12-20 RX ORDER — SUMATRIPTAN 25 MG/1
TABLET, FILM COATED ORAL
Qty: 9 TABLET | Refills: 1 | Status: SHIPPED | OUTPATIENT
Start: 2022-12-20

## 2022-12-30 ENCOUNTER — HOSPITAL ENCOUNTER (OUTPATIENT)
Dept: MAMMOGRAPHY | Age: 47
Discharge: HOME | End: 2022-12-30
Attending: INTERNAL MEDICINE
Payer: COMMERCIAL

## 2022-12-30 DIAGNOSIS — Z12.31 ENCOUNTER FOR SCREENING MAMMOGRAM FOR MALIGNANT NEOPLASM OF BREAST: ICD-10-CM

## 2022-12-30 PROCEDURE — 77067 SCR MAMMO BI INCL CAD: CPT | Performed by: INTERNAL MEDICINE

## 2022-12-30 PROCEDURE — 77063 BREAST TOMOSYNTHESIS BI: CPT | Performed by: INTERNAL MEDICINE

## 2023-01-18 DIAGNOSIS — F41.9 ANXIETY AND DEPRESSION: ICD-10-CM

## 2023-01-18 DIAGNOSIS — F32.A ANXIETY AND DEPRESSION: ICD-10-CM

## 2023-01-18 DIAGNOSIS — F90.0 ATTENTION DEFICIT HYPERACTIVITY DISORDER (ADHD), PREDOMINANTLY INATTENTIVE TYPE: ICD-10-CM

## 2023-01-18 DIAGNOSIS — G43.109 MIGRAINE WITH AURA AND WITHOUT STATUS MIGRAINOSUS, NOT INTRACTABLE: ICD-10-CM

## 2023-01-19 RX ORDER — BUPROPION HYDROCHLORIDE 300 MG/1
300 TABLET ORAL DAILY
Qty: 90 TABLET | Refills: 3 | Status: SHIPPED | OUTPATIENT
Start: 2023-01-19

## 2023-01-19 RX ORDER — SUMATRIPTAN 25 MG/1
TABLET, FILM COATED ORAL
Qty: 9 TABLET | Refills: 1 | Status: SHIPPED | OUTPATIENT
Start: 2023-01-19

## 2023-02-21 ENCOUNTER — OFFICE VISIT (OUTPATIENT)
Dept: FAMILY MEDICINE CLINIC | Facility: CLINIC | Age: 48
End: 2023-02-21
Payer: COMMERCIAL

## 2023-02-21 VITALS
SYSTOLIC BLOOD PRESSURE: 118 MMHG | OXYGEN SATURATION: 98 % | WEIGHT: 185 LBS | RESPIRATION RATE: 16 BRPM | BODY MASS INDEX: 30 KG/M2 | TEMPERATURE: 98 F | DIASTOLIC BLOOD PRESSURE: 72 MMHG | HEART RATE: 100 BPM

## 2023-02-21 DIAGNOSIS — J02.9 SORE THROAT: Primary | ICD-10-CM

## 2023-02-21 DIAGNOSIS — R68.89 FLU-LIKE SYMPTOMS: ICD-10-CM

## 2023-02-21 LAB
CONTROL LINE PRESENT WITH A CLEAR BACKGROUND (YES/NO): YES YES/NO
STREP GRP A CUL-SCR: NEGATIVE

## 2023-02-21 PROCEDURE — 87637 SARSCOV2&INF A&B&RSV AMP PRB: CPT | Performed by: PHYSICIAN ASSISTANT

## 2023-02-21 PROCEDURE — 87880 STREP A ASSAY W/OPTIC: CPT | Performed by: PHYSICIAN ASSISTANT

## 2023-02-21 PROCEDURE — 99213 OFFICE O/P EST LOW 20 MIN: CPT | Performed by: PHYSICIAN ASSISTANT

## 2023-02-21 PROCEDURE — 3078F DIAST BP <80 MM HG: CPT | Performed by: PHYSICIAN ASSISTANT

## 2023-02-21 PROCEDURE — 3074F SYST BP LT 130 MM HG: CPT | Performed by: PHYSICIAN ASSISTANT

## 2023-02-21 RX ORDER — BENZONATATE 200 MG/1
200 CAPSULE ORAL 3 TIMES DAILY PRN
Qty: 30 CAPSULE | Refills: 0 | Status: SHIPPED | OUTPATIENT
Start: 2023-02-21

## 2023-02-22 LAB
FLUAV + FLUBV RNA SPEC NAA+PROBE: NOT DETECTED
FLUAV + FLUBV RNA SPEC NAA+PROBE: NOT DETECTED
RSV RNA SPEC NAA+PROBE: NOT DETECTED
SARS-COV-2 RNA RESP QL NAA+PROBE: NOT DETECTED

## 2023-02-22 RX ORDER — BUPROPION HYDROCHLORIDE 300 MG/1
TABLET ORAL
Qty: 90 TABLET | Refills: 1 | Status: SHIPPED | OUTPATIENT
Start: 2023-02-22

## 2023-03-01 DIAGNOSIS — B35.1 ONYCHOMYCOSIS: ICD-10-CM

## 2023-03-02 DIAGNOSIS — B35.1 ONYCHOMYCOSIS: ICD-10-CM

## 2023-03-08 ENCOUNTER — TELEPHONE (OUTPATIENT)
Dept: INTERNAL MEDICINE CLINIC | Facility: CLINIC | Age: 48
End: 2023-03-08

## 2023-04-24 DIAGNOSIS — B35.1 ONYCHOMYCOSIS: ICD-10-CM

## 2023-07-18 ENCOUNTER — LAB ENCOUNTER (OUTPATIENT)
Dept: LAB | Facility: HOSPITAL | Age: 48
End: 2023-07-18
Attending: INTERNAL MEDICINE
Payer: COMMERCIAL

## 2023-07-18 DIAGNOSIS — Z13.29 SCREENING FOR THYROID DISORDER: ICD-10-CM

## 2023-07-18 DIAGNOSIS — Z00.00 ROUTINE GENERAL MEDICAL EXAMINATION AT A HEALTH CARE FACILITY: ICD-10-CM

## 2023-07-18 DIAGNOSIS — Z13.228 SCREENING FOR METABOLIC DISORDER: ICD-10-CM

## 2023-07-18 DIAGNOSIS — Z13.0 SCREENING FOR BLOOD DISEASE: ICD-10-CM

## 2023-07-18 DIAGNOSIS — Z13.220 SCREENING FOR LIPID DISORDERS: ICD-10-CM

## 2023-07-18 LAB
ALBUMIN SERPL-MCNC: 3.7 G/DL (ref 3.4–5)
ALBUMIN/GLOB SERPL: 0.9 {RATIO} (ref 1–2)
ALP LIVER SERPL-CCNC: 60 U/L
ALT SERPL-CCNC: 21 U/L
ANION GAP SERPL CALC-SCNC: 5 MMOL/L (ref 0–18)
AST SERPL-CCNC: 13 U/L (ref 15–37)
BASOPHILS # BLD AUTO: 0.04 X10(3) UL (ref 0–0.2)
BASOPHILS NFR BLD AUTO: 0.5 %
BILIRUB SERPL-MCNC: 0.4 MG/DL (ref 0.1–2)
BUN BLD-MCNC: 9 MG/DL (ref 7–18)
CALCIUM BLD-MCNC: 8.9 MG/DL (ref 8.5–10.1)
CHLORIDE SERPL-SCNC: 109 MMOL/L (ref 98–112)
CHOLEST SERPL-MCNC: 193 MG/DL (ref ?–200)
CO2 SERPL-SCNC: 24 MMOL/L (ref 21–32)
CREAT BLD-MCNC: 0.71 MG/DL
EOSINOPHIL # BLD AUTO: 0.2 X10(3) UL (ref 0–0.7)
EOSINOPHIL NFR BLD AUTO: 2.7 %
ERYTHROCYTE [DISTWIDTH] IN BLOOD BY AUTOMATED COUNT: 12.6 %
FASTING PATIENT LIPID ANSWER: YES
FASTING STATUS PATIENT QL REPORTED: YES
GFR SERPLBLD BASED ON 1.73 SQ M-ARVRAT: 107 ML/MIN/1.73M2 (ref 60–?)
GLOBULIN PLAS-MCNC: 3.9 G/DL (ref 2.8–4.4)
GLUCOSE BLD-MCNC: 112 MG/DL (ref 70–99)
HCT VFR BLD AUTO: 40.8 %
HDLC SERPL-MCNC: 56 MG/DL (ref 40–59)
HGB BLD-MCNC: 13.1 G/DL
IMM GRANULOCYTES # BLD AUTO: 0.02 X10(3) UL (ref 0–1)
IMM GRANULOCYTES NFR BLD: 0.3 %
LDLC SERPL CALC-MCNC: 102 MG/DL (ref ?–100)
LYMPHOCYTES # BLD AUTO: 2.24 X10(3) UL (ref 1–4)
LYMPHOCYTES NFR BLD AUTO: 30.3 %
MCH RBC QN AUTO: 27.4 PG (ref 26–34)
MCHC RBC AUTO-ENTMCNC: 32.1 G/DL (ref 31–37)
MCV RBC AUTO: 85.4 FL
MONOCYTES # BLD AUTO: 0.61 X10(3) UL (ref 0.1–1)
MONOCYTES NFR BLD AUTO: 8.2 %
NEUTROPHILS # BLD AUTO: 4.29 X10 (3) UL (ref 1.5–7.7)
NEUTROPHILS # BLD AUTO: 4.29 X10(3) UL (ref 1.5–7.7)
NEUTROPHILS NFR BLD AUTO: 58 %
NONHDLC SERPL-MCNC: 137 MG/DL (ref ?–130)
OSMOLALITY SERPL CALC.SUM OF ELEC: 285 MOSM/KG (ref 275–295)
PLATELET # BLD AUTO: 371 10(3)UL (ref 150–450)
POTASSIUM SERPL-SCNC: 3.7 MMOL/L (ref 3.5–5.1)
PROT SERPL-MCNC: 7.6 G/DL (ref 6.4–8.2)
RBC # BLD AUTO: 4.78 X10(6)UL
SODIUM SERPL-SCNC: 138 MMOL/L (ref 136–145)
T3FREE SERPL-MCNC: 2.86 PG/ML (ref 2.4–4.2)
T4 FREE SERPL-MCNC: 1 NG/DL (ref 0.8–1.7)
TRIGL SERPL-MCNC: 202 MG/DL (ref 30–149)
TSI SER-ACNC: 0.26 MIU/ML (ref 0.36–3.74)
VLDLC SERPL CALC-MCNC: 34 MG/DL (ref 0–30)
WBC # BLD AUTO: 7.4 X10(3) UL (ref 4–11)

## 2023-07-18 PROCEDURE — 84439 ASSAY OF FREE THYROXINE: CPT

## 2023-07-18 PROCEDURE — 85025 COMPLETE CBC W/AUTO DIFF WBC: CPT

## 2023-07-18 PROCEDURE — 80061 LIPID PANEL: CPT

## 2023-07-18 PROCEDURE — 84443 ASSAY THYROID STIM HORMONE: CPT

## 2023-07-18 PROCEDURE — 80053 COMPREHEN METABOLIC PANEL: CPT

## 2023-07-18 PROCEDURE — 84481 FREE ASSAY (FT-3): CPT

## 2023-07-18 PROCEDURE — 36415 COLL VENOUS BLD VENIPUNCTURE: CPT

## 2023-07-22 ENCOUNTER — OFFICE VISIT (OUTPATIENT)
Dept: INTERNAL MEDICINE CLINIC | Facility: CLINIC | Age: 48
End: 2023-07-22
Payer: COMMERCIAL

## 2023-07-22 VITALS
HEIGHT: 64.96 IN | SYSTOLIC BLOOD PRESSURE: 124 MMHG | HEART RATE: 75 BPM | OXYGEN SATURATION: 99 % | DIASTOLIC BLOOD PRESSURE: 78 MMHG | BODY MASS INDEX: 31.16 KG/M2 | WEIGHT: 187 LBS

## 2023-07-22 DIAGNOSIS — Z12.31 ENCOUNTER FOR SCREENING MAMMOGRAM FOR MALIGNANT NEOPLASM OF BREAST: ICD-10-CM

## 2023-07-22 DIAGNOSIS — R73.9 BLOOD GLUCOSE ELEVATED: ICD-10-CM

## 2023-07-22 DIAGNOSIS — F41.9 ANXIETY AND DEPRESSION: ICD-10-CM

## 2023-07-22 DIAGNOSIS — F32.A ANXIETY AND DEPRESSION: ICD-10-CM

## 2023-07-22 DIAGNOSIS — R79.89 ABNORMAL TSH: ICD-10-CM

## 2023-07-22 DIAGNOSIS — L81.9 ATYPICAL PIGMENTED SKIN LESION: ICD-10-CM

## 2023-07-22 DIAGNOSIS — Z00.00 ROUTINE GENERAL MEDICAL EXAMINATION AT A HEALTH CARE FACILITY: Primary | ICD-10-CM

## 2023-07-22 DIAGNOSIS — G43.109 MIGRAINE WITH AURA AND WITHOUT STATUS MIGRAINOSUS, NOT INTRACTABLE: ICD-10-CM

## 2023-07-22 DIAGNOSIS — R06.83 SNORING: ICD-10-CM

## 2023-07-22 PROCEDURE — 3074F SYST BP LT 130 MM HG: CPT | Performed by: INTERNAL MEDICINE

## 2023-07-22 PROCEDURE — 99396 PREV VISIT EST AGE 40-64: CPT | Performed by: INTERNAL MEDICINE

## 2023-07-22 PROCEDURE — 3078F DIAST BP <80 MM HG: CPT | Performed by: INTERNAL MEDICINE

## 2023-07-22 PROCEDURE — 3008F BODY MASS INDEX DOCD: CPT | Performed by: INTERNAL MEDICINE

## 2023-07-22 RX ORDER — ALPRAZOLAM 0.25 MG/1
0.25 TABLET ORAL NIGHTLY PRN
Qty: 10 TABLET | Refills: 0 | Status: SHIPPED | OUTPATIENT
Start: 2023-07-22

## 2023-07-22 RX ORDER — BUPROPION HYDROCHLORIDE 300 MG/1
300 TABLET ORAL DAILY
Qty: 90 TABLET | Refills: 3 | Status: SHIPPED | OUTPATIENT
Start: 2023-07-22

## 2023-07-22 RX ORDER — SUMATRIPTAN 25 MG/1
TABLET, FILM COATED ORAL
Qty: 9 TABLET | Refills: 1 | Status: SHIPPED | OUTPATIENT
Start: 2023-07-22

## 2023-09-26 PROBLEM — Z12.11 SPECIAL SCREENING FOR MALIGNANT NEOPLASM OF COLON: Status: ACTIVE | Noted: 2023-09-26

## 2023-10-09 ENCOUNTER — LAB ENCOUNTER (OUTPATIENT)
Dept: LAB | Age: 48
End: 2023-10-09
Attending: INTERNAL MEDICINE
Payer: COMMERCIAL

## 2023-10-09 DIAGNOSIS — R79.89 ABNORMAL TSH: ICD-10-CM

## 2023-10-09 DIAGNOSIS — R73.9 BLOOD GLUCOSE ELEVATED: ICD-10-CM

## 2023-10-09 LAB
EST. AVERAGE GLUCOSE BLD GHB EST-MCNC: 126 MG/DL (ref 68–126)
HBA1C MFR BLD: 6 % (ref ?–5.7)
T4 FREE SERPL-MCNC: 0.7 NG/DL (ref 0.8–1.7)
TSI SER-ACNC: 9.07 MIU/ML (ref 0.36–3.74)

## 2023-10-09 PROCEDURE — 84443 ASSAY THYROID STIM HORMONE: CPT | Performed by: INTERNAL MEDICINE

## 2023-10-09 PROCEDURE — 84439 ASSAY OF FREE THYROXINE: CPT | Performed by: INTERNAL MEDICINE

## 2023-10-09 PROCEDURE — 83036 HEMOGLOBIN GLYCOSYLATED A1C: CPT | Performed by: INTERNAL MEDICINE

## 2023-10-21 ENCOUNTER — OFFICE VISIT (OUTPATIENT)
Dept: INTERNAL MEDICINE CLINIC | Facility: CLINIC | Age: 48
End: 2023-10-21
Payer: COMMERCIAL

## 2023-10-21 VITALS
BODY MASS INDEX: 30.16 KG/M2 | SYSTOLIC BLOOD PRESSURE: 106 MMHG | HEIGHT: 65 IN | RESPIRATION RATE: 18 BRPM | WEIGHT: 181 LBS | DIASTOLIC BLOOD PRESSURE: 76 MMHG | OXYGEN SATURATION: 99 % | HEART RATE: 78 BPM

## 2023-10-21 DIAGNOSIS — F32.A ANXIETY AND DEPRESSION: ICD-10-CM

## 2023-10-21 DIAGNOSIS — G43.119 INTRACTABLE MIGRAINE WITH AURA WITHOUT STATUS MIGRAINOSUS: ICD-10-CM

## 2023-10-21 DIAGNOSIS — F41.9 ANXIETY AND DEPRESSION: ICD-10-CM

## 2023-10-21 DIAGNOSIS — E03.8 OTHER SPECIFIED HYPOTHYROIDISM: Primary | ICD-10-CM

## 2023-10-21 PROCEDURE — 99214 OFFICE O/P EST MOD 30 MIN: CPT | Performed by: INTERNAL MEDICINE

## 2023-10-21 PROCEDURE — 3078F DIAST BP <80 MM HG: CPT | Performed by: INTERNAL MEDICINE

## 2023-10-21 PROCEDURE — 3074F SYST BP LT 130 MM HG: CPT | Performed by: INTERNAL MEDICINE

## 2023-10-21 PROCEDURE — 3008F BODY MASS INDEX DOCD: CPT | Performed by: INTERNAL MEDICINE

## 2023-10-21 RX ORDER — ALPRAZOLAM 0.25 MG/1
0.25 TABLET ORAL NIGHTLY PRN
Qty: 10 TABLET | Refills: 0 | Status: SHIPPED | OUTPATIENT
Start: 2023-10-21

## 2023-10-21 RX ORDER — BUTALBITAL, ACETAMINOPHEN AND CAFFEINE 300; 40; 50 MG/1; MG/1; MG/1
1 CAPSULE ORAL EVERY 12 HOURS PRN
Qty: 30 CAPSULE | Refills: 0 | Status: SHIPPED | OUTPATIENT
Start: 2023-10-21 | End: 2023-11-20

## 2023-10-21 RX ORDER — NAPROXEN 500 MG/1
500 TABLET ORAL 2 TIMES DAILY PRN
Qty: 60 TABLET | Refills: 1 | Status: CANCELLED | OUTPATIENT
Start: 2023-10-21

## 2023-10-21 RX ORDER — LEVOTHYROXINE SODIUM 0.03 MG/1
25 TABLET ORAL
Qty: 90 TABLET | Refills: 0 | Status: SHIPPED | OUTPATIENT
Start: 2023-10-21

## 2023-11-16 ENCOUNTER — OFFICE VISIT (OUTPATIENT)
Dept: SLEEP CENTER | Age: 48
End: 2023-11-16
Attending: Other
Payer: COMMERCIAL

## 2023-11-16 DIAGNOSIS — R06.83 SNORING: Primary | ICD-10-CM

## 2023-11-16 PROCEDURE — 95810 POLYSOM 6/> YRS 4/> PARAM: CPT

## 2023-11-21 ENCOUNTER — SLEEP STUDY (OUTPATIENT)
Facility: CLINIC | Age: 48
End: 2023-11-21
Payer: COMMERCIAL

## 2023-11-21 DIAGNOSIS — G47.33 OBSTRUCTIVE SLEEP APNEA SYNDROME: Primary | ICD-10-CM

## 2023-11-21 PROCEDURE — 95810 POLYSOM 6/> YRS 4/> PARAM: CPT | Performed by: OTHER

## 2023-11-27 ENCOUNTER — TELEPHONE (OUTPATIENT)
Dept: INTERNAL MEDICINE CLINIC | Facility: CLINIC | Age: 48
End: 2023-11-27

## 2023-11-27 NOTE — TELEPHONE ENCOUNTER
Patient states she received her results from the Sleep Study on [facility], has not talked to anyone about the results. Pt would like to know if she should see a specialist regarding the Dx of  mild BENITA? Please advise.

## 2023-11-27 NOTE — TELEPHONE ENCOUNTER
Pt saw [facility] already and got results but pt is wanting a sleep study done. Is that something TB can order for her prior to Mathew oliver?

## 2024-01-04 ENCOUNTER — HOSPITAL ENCOUNTER (OUTPATIENT)
Dept: MAMMOGRAPHY | Age: 49
Discharge: HOME | End: 2024-01-04
Attending: INTERNAL MEDICINE
Payer: COMMERCIAL

## 2024-01-04 DIAGNOSIS — Z12.31 ENCOUNTER FOR SCREENING MAMMOGRAM FOR MALIGNANT NEOPLASM OF BREAST: ICD-10-CM

## 2024-01-04 PROCEDURE — 77067 SCR MAMMO BI INCL CAD: CPT | Performed by: INTERNAL MEDICINE

## 2024-01-04 PROCEDURE — 77063 BREAST TOMOSYNTHESIS BI: CPT | Performed by: INTERNAL MEDICINE

## 2024-01-09 ENCOUNTER — HOSPITAL ENCOUNTER (OUTPATIENT)
Dept: MAMMOGRAPHY | Facility: HOSPITAL | Age: 49
Discharge: HOME | End: 2024-01-09
Attending: INTERNAL MEDICINE
Payer: COMMERCIAL

## 2024-01-09 DIAGNOSIS — R92.2 INCONCLUSIVE MAMMOGRAM: ICD-10-CM

## 2024-01-09 PROCEDURE — 77066 DX MAMMO INCL CAD BI: CPT | Performed by: INTERNAL MEDICINE

## 2024-01-09 PROCEDURE — 77062 BREAST TOMOSYNTHESIS BI: CPT | Performed by: INTERNAL MEDICINE

## 2024-01-09 PROCEDURE — 76642 ULTRASOUND BREAST LIMITED: CPT | Performed by: INTERNAL MEDICINE

## 2024-01-09 NOTE — IMAGING NOTE
Lorelei Esteban Clemente is recommended for an ultrasound guided biopsy of the left breast x two sites by .     History Inconclusive mammogram   Findings-2 sites in left breast 7 o'clock position 4-5 cm from nipple and 7 o'clock position 2 cm from nipple   Recommendation-ULTRASOUND-GUIDED BIOPSY: LEFT BREAST 2 SITES     See EMR for complete imaging report    Medications and allergies reviewed: NKDA reported  Current Outpatient Medications   Medication Sig Dispense Refill    levothyroxine 25 MCG Oral Tab Take 1 tablet (25 mcg total) by mouth before breakfast. 90 tablet 0    ALPRAZolam (XANAX) 0.25 MG Oral Tab Take 1 tablet (0.25 mg total) by mouth nightly as needed. 10 tablet 0    buPROPion  MG Oral Tablet 24 Hr Take 1 tablet (300 mg total) by mouth daily. 90 tablet 3    SUMAtriptan 25 MG Oral Tab TAKE 1 TABLET BY MOUTH AS NEEDED FOR MIGRAINE. MAY REPEAT IN 2 HOURS IF NEEDED(MAX 2 TABLETS PER DAY) AS DIRECTED 9 tablet 1    Ciclopirox 8 % External Solution Apply to affected nail nightly 6 mL 1    naproxen 500 MG Oral Tab Take 1 tablet (500 mg total) by mouth 2 (two) times daily as needed. 60 tablet 1       Lorelei Esteban Clemente denies the use of prescribed anticoagulants, denies known bleeding disorders and/or liver disease, denies chemotherapy    Procedure explained and questions answered.   Lorelei Clemente  provided with written educational material.     Ms. Esteban Clemente instructed to take 1000 mg of acetaminophen on the day of the biopsy, eat a light meal, and bring or wear a sport bra.  Post biopsy care and instruction reviewed: including no lifting more than five pounds, no upper body exercise, icing of biopsy site, no submerging in water.  Lorelei Clemente verbalized understanding.    Dr. Thibodeaux provided a super order.   Ms. Esteban Clemente provided with an appointment at [facility] women[facility] imaging center- Friday, January 12 at 0830.  Appointment confirmed with breast center .

## 2024-01-11 DIAGNOSIS — E03.8 OTHER SPECIFIED HYPOTHYROIDISM: ICD-10-CM

## 2024-01-12 ENCOUNTER — HOSPITAL ENCOUNTER (OUTPATIENT)
Dept: MAMMOGRAPHY | Facility: HOSPITAL | Age: 49
Discharge: HOME | End: 2024-01-12
Attending: INTERNAL MEDICINE
Payer: COMMERCIAL

## 2024-01-12 DIAGNOSIS — N63.0 BREAST NODULE: ICD-10-CM

## 2024-01-12 PROCEDURE — 88341 IMHCHEM/IMCYTCHM EA ADD ANTB: CPT | Performed by: INTERNAL MEDICINE

## 2024-01-12 PROCEDURE — 19084 BX BREAST ADD LESION US IMAG: CPT | Performed by: INTERNAL MEDICINE

## 2024-01-12 PROCEDURE — 88305 TISSUE EXAM BY PATHOLOGIST: CPT | Performed by: INTERNAL MEDICINE

## 2024-01-12 PROCEDURE — 77065 DX MAMMO INCL CAD UNI: CPT | Performed by: INTERNAL MEDICINE

## 2024-01-12 PROCEDURE — 88344 IMHCHEM/IMCYTCHM EA MLT ANTB: CPT | Performed by: INTERNAL MEDICINE

## 2024-01-12 PROCEDURE — 88342 IMHCHEM/IMCYTCHM 1ST ANTB: CPT | Performed by: INTERNAL MEDICINE

## 2024-01-12 PROCEDURE — 19083 BX BREAST 1ST LESION US IMAG: CPT | Performed by: INTERNAL MEDICINE

## 2024-01-12 RX ORDER — NAPROXEN 500 MG/1
500 TABLET ORAL 2 TIMES DAILY PRN
Qty: 60 TABLET | Refills: 1 | OUTPATIENT
Start: 2024-01-12

## 2024-01-12 RX ORDER — LEVOTHYROXINE SODIUM 0.03 MG/1
25 TABLET ORAL
Qty: 90 TABLET | Refills: 0 | Status: SHIPPED | OUTPATIENT
Start: 2024-01-12

## 2024-01-12 NOTE — TELEPHONE ENCOUNTER
Requested Prescriptions     Pending Prescriptions Disp Refills    levothyroxine 25 MCG Oral Tab 90 tablet 0     Sig: Take 1 tablet (25 mcg total) by mouth before breakfast.       LOV:  10.--sy-acute visit    LAST CPE: 7--tb-physical     Last Labs: 7--lipid,cbc,cmp,tsh    Last Refill: 10.21.23- 90 tabs with 0 refills     Your appointments       Date & Time Appointment Department (Center)    Jan 23, 2024 11:30 AM CST Sleep Consult with Barry Nix DO [facility] ([facility])    Sleep Patients:  Please bring your PAP device (no mask/hose) to each appointment unless instructed otherwise.        Jan 23, 2024  3:20 PM CST Follow Up Visit with Fozia Thibodeaux MD [address] ([facility] [address] [place] [address]/[facility])    Contact your primary care provider if your insurance requires a referral.    Please arrive 15 minutes prior to your scheduled appointment. Be sure to bring your current Insurance card, Photo ID, and medication bottles or a list of your current medications.      A 24 hour notice is required to cancel any appointment or you may be charged a $40 No Show Fee.     Important: 24 hour notice is required to cancel any appointment or you may be charged a $40 No Show Fee. Please notify your physician office.               [address]  [facility] IM/[facility]  [address]  [facility] [address] [phone] [facility]  [address]  [facility] [address]  [phone]

## 2024-01-12 NOTE — TELEPHONE ENCOUNTER
Requested Prescriptions     Pending Prescriptions Disp Refills    naproxen 500 MG Oral Tab 60 tablet 1     Sig: Take 1 tablet (500 mg total) by mouth 2 (two) times daily as needed.     LOV:  10.--ia-acute visit     LAST CPE: 7--tb-physical      Last Labs: 7--lipid,cbc,cmp,tsh     Last Refill: 10--needs appt for refill

## 2024-01-15 ENCOUNTER — TELEPHONE (OUTPATIENT)
Dept: INTERNAL MEDICINE CLINIC | Facility: CLINIC | Age: 49
End: 2024-01-15

## 2024-01-15 ENCOUNTER — TELEPHONE (OUTPATIENT)
Dept: MAMMOGRAPHY | Facility: HOSPITAL | Age: 49
End: 2024-01-15

## 2024-01-15 NOTE — TELEPHONE ENCOUNTER
Telephoned Lorelei Ewa Clemente and name,  verified with patient. Notified Lorelei Clemente of left breast 1 site negative for malignancy but positive for radial scar and the other site negative for malignancy but positive for radial scar and papilloma biopsy result. Concordance pending. Lorelei Orellana Clemente reports biopsy site is healing well. Radiologist recommends surgical consultation. Dr Thibodeaux's office is referring patient to Dr Martell. Patient was provided with the contact information for Dr Martell and instructed to call for a consultation appt. Pt verbalized understanding and had no further questions at this time.

## 2024-01-16 ENCOUNTER — LAB ENCOUNTER (OUTPATIENT)
Dept: LAB | Age: 49
End: 2024-01-16
Attending: INTERNAL MEDICINE
Payer: COMMERCIAL

## 2024-01-16 DIAGNOSIS — E03.8 OTHER SPECIFIED HYPOTHYROIDISM: ICD-10-CM

## 2024-01-16 LAB
T4 FREE SERPL-MCNC: 0.8 NG/DL (ref 0.8–1.7)
TSI SER-ACNC: 4.71 MIU/ML (ref 0.36–3.74)

## 2024-01-16 PROCEDURE — 84443 ASSAY THYROID STIM HORMONE: CPT

## 2024-01-16 PROCEDURE — 84439 ASSAY OF FREE THYROXINE: CPT

## 2024-01-23 ENCOUNTER — OFFICE VISIT (OUTPATIENT)
Dept: INTERNAL MEDICINE CLINIC | Facility: CLINIC | Age: 49
End: 2024-01-23
Payer: COMMERCIAL

## 2024-01-23 ENCOUNTER — OFFICE VISIT (OUTPATIENT)
Facility: CLINIC | Age: 49
End: 2024-01-23
Payer: COMMERCIAL

## 2024-01-23 VITALS
WEIGHT: 197 LBS | TEMPERATURE: 97 F | OXYGEN SATURATION: 99 % | HEART RATE: 80 BPM | RESPIRATION RATE: 18 BRPM | BODY MASS INDEX: 32.82 KG/M2 | DIASTOLIC BLOOD PRESSURE: 74 MMHG | HEIGHT: 65 IN | SYSTOLIC BLOOD PRESSURE: 120 MMHG

## 2024-01-23 VITALS
BODY MASS INDEX: 29.82 KG/M2 | SYSTOLIC BLOOD PRESSURE: 122 MMHG | HEART RATE: 87 BPM | HEIGHT: 65 IN | WEIGHT: 179 LBS | OXYGEN SATURATION: 98 % | RESPIRATION RATE: 18 BRPM | DIASTOLIC BLOOD PRESSURE: 72 MMHG

## 2024-01-23 DIAGNOSIS — G43.109 MIGRAINE WITH AURA AND WITHOUT STATUS MIGRAINOSUS, NOT INTRACTABLE: ICD-10-CM

## 2024-01-23 DIAGNOSIS — E74.39 GLUCOSE INTOLERANCE: ICD-10-CM

## 2024-01-23 DIAGNOSIS — G47.33 OBSTRUCTIVE SLEEP APNEA: ICD-10-CM

## 2024-01-23 DIAGNOSIS — G47.33 OSA (OBSTRUCTIVE SLEEP APNEA): ICD-10-CM

## 2024-01-23 DIAGNOSIS — E66.9 OBESITY (BMI 30-39.9): ICD-10-CM

## 2024-01-23 DIAGNOSIS — D36.9 INTRADUCTAL PAPILLOMA: ICD-10-CM

## 2024-01-23 DIAGNOSIS — G47.10 HYPERSOMNIA: ICD-10-CM

## 2024-01-23 DIAGNOSIS — E03.8 OTHER SPECIFIED HYPOTHYROIDISM: Primary | ICD-10-CM

## 2024-01-23 DIAGNOSIS — G43.109 MIGRAINE WITH AURA AND WITHOUT STATUS MIGRAINOSUS, NOT INTRACTABLE: Primary | ICD-10-CM

## 2024-01-23 PROCEDURE — 3078F DIAST BP <80 MM HG: CPT | Performed by: OTHER

## 2024-01-23 PROCEDURE — 3074F SYST BP LT 130 MM HG: CPT | Performed by: OTHER

## 2024-01-23 PROCEDURE — 3078F DIAST BP <80 MM HG: CPT | Performed by: INTERNAL MEDICINE

## 2024-01-23 PROCEDURE — 3074F SYST BP LT 130 MM HG: CPT | Performed by: INTERNAL MEDICINE

## 2024-01-23 PROCEDURE — 99204 OFFICE O/P NEW MOD 45 MIN: CPT | Performed by: OTHER

## 2024-01-23 PROCEDURE — 3008F BODY MASS INDEX DOCD: CPT | Performed by: OTHER

## 2024-01-23 PROCEDURE — 99214 OFFICE O/P EST MOD 30 MIN: CPT | Performed by: INTERNAL MEDICINE

## 2024-01-23 PROCEDURE — 3008F BODY MASS INDEX DOCD: CPT | Performed by: INTERNAL MEDICINE

## 2024-01-23 RX ORDER — LEVOTHYROXINE SODIUM 0.05 MG/1
50 TABLET ORAL
Qty: 90 TABLET | Refills: 1 | Status: SHIPPED | OUTPATIENT
Start: 2024-01-23

## 2024-01-23 RX ORDER — DICLOFENAC SODIUM 75 MG/1
75 TABLET, DELAYED RELEASE ORAL DAILY PRN
Qty: 30 TABLET | Refills: 1 | Status: SHIPPED | OUTPATIENT
Start: 2024-01-23

## 2024-01-23 RX ORDER — ONDANSETRON 4 MG/1
4 TABLET, FILM COATED ORAL EVERY 8 HOURS PRN
Qty: 30 TABLET | Refills: 1 | Status: SHIPPED | OUTPATIENT
Start: 2024-01-23

## 2024-01-23 NOTE — PROGRESS NOTES
Lorelei Clemente is a 48 year old female.    Chief Complaint   Patient presents with    Follow - Up     EJ RM 4- Pt is here to follow up with her thyroid issue, sleep apnea, and discuss mammogram    Migraine     Wants script for diclofenac       HPI:     Pleasant patient with recent diagnosis of BENITA, migraines, hypothyroidism, pre dm, intraductal papilloma left breast here for follow up-  She met with Dr. Nix for BENITA and will trial CPAP soon. Sleep is terrible, lot of snoring. Poor sleep may be contributing to her migraines. She recently had a migraine and took one of her 's diclofenac with good relief. She would like to get script for this along with something for nausea with the migraine.  Thyroid labs are improving on levothyroxine 25mcg daily, tsh down to 4.7 and free t4 low normal at 0.8 from 0.7.   She does have pre dm with hgba1c of 6. She would like medication to help with pre dm and her weight. She weighed in at 197 pounds today, BMI 32.7  Breast biopsy results reviewed with patient, intraductal papilloma on the left. She made appointment already with Dr. Martell.      Patient Active Problem List   Diagnosis    Migraine with aura    Anxiety and depression    Attention deficit hyperactivity disorder (ADHD), predominantly inattentive type    Blood glucose elevated    Abnormal TSH    Snoring    Atypical pigmented skin lesion    Special screening for malignant neoplasm of colon    Obstructive sleep apnea    Hypersomnia    Intraductal papilloma    Obesity (BMI 30-39.9)    Glucose intolerance    Other specified hypothyroidism     Current Outpatient Medications   Medication Sig Dispense Refill    metFORMIN 500 MG Oral Tab Take 1 tablet (500 mg total) by mouth 2 (two) times daily with meals. 30 tablet 3    levothyroxine 50 MCG Oral Tab Take 1 tablet (50 mcg total) by mouth before breakfast. 90 tablet 1    diclofenac 75 MG Oral Tab EC Take 1 tablet (75 mg total) by mouth daily as needed (pain). 30  tablet 1    ondansetron (ZOFRAN) 4 mg tablet Take 1 tablet (4 mg total) by mouth every 8 (eight) hours as needed for Nausea. 30 tablet 1    levothyroxine 25 MCG Oral Tab Take 1 tablet (25 mcg total) by mouth before breakfast. 90 tablet 0    ALPRAZolam (XANAX) 0.25 MG Oral Tab Take 1 tablet (0.25 mg total) by mouth nightly as needed. 10 tablet 0    buPROPion  MG Oral Tablet 24 Hr Take 1 tablet (300 mg total) by mouth daily. 90 tablet 3    SUMAtriptan 25 MG Oral Tab TAKE 1 TABLET BY MOUTH AS NEEDED FOR MIGRAINE. MAY REPEAT IN 2 HOURS IF NEEDED(MAX 2 TABLETS PER DAY) AS DIRECTED 9 tablet 1    Ciclopirox 8 % External Solution Apply to affected nail nightly 6 mL 1    naproxen 500 MG Oral Tab Take 1 tablet (500 mg total) by mouth 2 (two) times daily as needed. 60 tablet 1      Past Medical History:   Diagnosis Date    Anxiety     Decorative tattoo     History of gestational diabetes in prior pregnancy, currently pregnant     HPV in female 05/01/2012    Migraine     Sterilization 07/01/2011    Essure    Wears glasses       Social History:  Social History     Socioeconomic History    Marital status:    Tobacco Use    Smoking status: Never    Smokeless tobacco: Never   Vaping Use    Vaping Use: Never used   Substance and Sexual Activity    Alcohol use: No    Drug use: No    Sexual activity: Yes     Partners: Male     Comment: [facility]   Social History Narrative    ** Merged History Encounter **          Family History   Problem Relation Age of Onset    Diabetes Mother     Hypertension Mother     Lipids Mother     Other (Other) Mother         thyroid disease    Diabetes Maternal Grandmother     Hypertension Maternal Grandmother     Heart Attack Maternal Grandfather     Diabetes Maternal Grandfather     Hypertension Maternal Grandfather     Heart Disorder Maternal Grandfather         heart attack    Diabetes Paternal Grandmother     Diabetes Paternal Grandfather     Cancer Maternal Aunt 57        lung cancer         Allergies  Allergies   Allergen Reactions    Shrimp TONGUE SWELLING         REVIEW OF SYSTEMS:   GENERAL HEALTH:  no fevers   RESPIRATORY: no cough  CARDIOVASCULAR: denies chest pain  GI: denies abdominal pain  : no dysuria  NEURO: migraine headaches  PSYCH: No reported depression   HEME: No adenopathy      EXAM:   /74   Pulse 80   Temp 96.9 °F (36.1 °C) (Temporal)   Resp 18   Ht 5' 5\" (1.651 m)   Wt 197 lb (89.4 kg)   LMP 05/28/2012 (LMP Unknown)   SpO2 99%   BMI 32.78 kg/m²   GENERAL: well developed, well nourished,in no apparent distress  HEENT: atraumatic, normocephalic  NECK: supple,no adenopathy, no TM  LUNGS: normal rate without respiratory distress, lungs clear to auscultation  CARDIO: RRR nl S1 S2  GI: normal bowel sounds, soft, obese, NT/ND  EXTREMITIES: no cyanosis, clubbing or edema  NEURO: Alert and oriented    ASSESSMENT AND PLAN:     Encounter Diagnoses   Name     Other specified hypothyroidism- TSH elevated at 4.7, titrate levothyroxine up to 50mcg daily, rpt labs in 3 months     Glucose intolerance- start metformin 500mg daily, watch diet. Monitor hgba1c     Migraine with aura and without status migrainosus, not intractable- diclofenac sent in as requested, zofran prn nausea     Obesity (BMI 30-39.9)- discussed diet and exercise, start metformin     Obstructive sleep apnea- encouraged to use CPAP, pt working with Dr. Nix     Intraductal papilloma- biopsy results reviewed with pt, she has appt with Dr. Martell next month        Orders Placed This Encounter   Procedures    TSH and Free T4 [E]    Hemoglobin A1C [E]    Basic Metabolic Panel (8) [E]       Meds & Refills for this Visit:  Requested Prescriptions     Signed Prescriptions Disp Refills    metFORMIN 500 MG Oral Tab 30 tablet 3     Sig: Take 1 tablet (500 mg total) by mouth 2 (two) times daily with meals.    levothyroxine 50 MCG Oral Tab 90 tablet 1     Sig: Take 1 tablet (50 mcg total) by mouth before breakfast.     diclofenac 75 MG Oral Tab EC 30 tablet 1     Sig: Take 1 tablet (75 mg total) by mouth daily as needed (pain).    ondansetron (ZOFRAN) 4 mg tablet 30 tablet 1     Sig: Take 1 tablet (4 mg total) by mouth every 8 (eight) hours as needed for Nausea.       Imaging & Consults:  None    Return in about 3 months (around 4/26/2024), or if symptoms worsen or fail to improve, for follow up .  There are no Patient Instructions on file for this visit.      The patient indicates understanding of these issues and agrees to the plan.

## 2024-01-23 NOTE — PATIENT INSTRUCTIONS
Please be advised:   Do not drive while sleepy   Take CPAP/BiPAP machine to any procedure that requires sedation     When should I clean my machine & supplies?   Clean mask cushions or nasal pillow, headgear, tubing, and humidifier chamber with mild soap (Cora) and water   If water chamber has hard water buildup (white crust), soak in warm water & vinegar mix 50/50.   Rinse and hang dry     DAILY   Wipe mask cushions or nasal pillow   Empty & rinse water chamber- refill with distilled water     WEEKLY   Clean mask cushions or nasal pillow, headgear, tubing, and humidifier chamber with mild soap (Cora) and water   If water chamber has hard water buildup (white crust), soak in warm water & vinegar mix 50/50.   Rinse and hang dry     When should supplies be replaced?   Contact your home care company for replacement supplies, or if your machine is malfunctioning   *Below is a general guideline of what we recommend. Replacement of supplies differs depending on your insurance company*   MONTHLY: Replace filter and mask cushion   6 MONTHS: Replace headgear and tubing     Travel Tips   Keep CPAP/BiPAP in original bag when traveling, and place luggage tag on bag   Most airlines consider CPAP/BiPAP to be a medical device, therefore it is a free carry-on item   If unable to get distilled water, bottled water is safe while traveling. DO NOT use tap water   When traveling outside the [place] only a power adapter is necessary (CPAP can operate without a converter), bring an extension cord   Consider purchasing or renting a travel CPAP (not covered by insurance)     Dry Mouth/Nose   Turn up the humidity on your machine (select \"Options\" from the home screen)   Place a cool mist humidifier at your bedside   Over-the-counter remedies: Biotene, XyliMelts, NasoGel     Air Leak   Try adjusting your mask/headgear while laying in sleeping position vs. sitting up   Wash and dry your face prior to putting your mask on   If applicable:  shave facial hair at night (or before wearing CPAP)   Purchase \"RemZzzs\" (through home care co., [email or web address], or [email or web address])   100% cotton knit barrier that goes between your mask cushion and your skin   Replace your mask cushion (at least once per month) and/or headgear (every 3-6 months)     Nasal Congestion   CPAP therapy can cause nasal passages to dry out, & mucous membranes try to protect the nasal passages by producing excess mucous, so congestion results.   Over-the counter remedies: Flonase, Nasacort, Sinus Rinses (Neti-Pot), DO NOT USE Afrin   Try a mask that goes over the nose and mouth     Skin Irritation   Clean supplies regularly (Citrus II Mask Wipes, Control III disinfectant solution)   Try over the counter creams such as hydrocortisone 1% (apply in the morning after showering)   Your headgear may be too tight, replace supplies so you don't need to adjust so tightly   Try RemZzzs (100% cotton knit barrier that goes between your mask cushion and your skin)     Gas/Bloating   Try a different sleeping position to keep air out of the stomach. Lay on the left side or rotate to the right side. Incline with pillows or lay flat.   Over-the-counter remedies: Simethicone

## 2024-01-23 NOTE — PROGRESS NOTES
[facility] PULMONARY  SLEEP PROGRESS NOTE        HPI:   This is a 48 year old female coming in for   Chief Complaint   Patient presents with    New Patient Chief Complaint     New sleep Consult        HPI:     This is a 48 year old female who presents with the following symptoms, risk factors, behaviors or other items associated with sleep problems.    Sleep Apnea:   snoring; gasping/choking; coughing; stops breathing; wakes with headache; wakes with dry mouth; mouth breathing; nasal congestion; restless sleep; non-refreshing sleep; overweight; recent weight gain  Insomnia:  difficulty falling asleep; difficulty staying asleep; non-refreshing sleep; restless sleep; not getting enough sleep; depresssion; anxiety  Restless Leg:  No data recorded  Parasomnias:   no symptoms of parasomnias  Daytime Problems:  sleepiness; fatigue; dificulty concentrating; dozing off unintentionally; dozing off at work    The patient's [place] Sleepiness score is 16/24.    Sleep study - 2023    One year ago, noted snoring, wakes herself, witnessed apnea, and gasping  Headaches got worse  Tired and sleepy  Affects her work  Falls asleep at her work  Gained 22 pounds    No RLS  Sleep hours   Wakes feeling tired  No sleepiness while driving      Patient: Sleep review of systems today: see form.      Pt  PCP:  Fozia Thibodeaux MD  No referring provider defined for this encounter.            No data to display                    Past Medical History:   Diagnosis Date    Anxiety     Decorative tattoo     History of gestational diabetes in prior pregnancy, currently pregnant     HPV in female 2012    Migraine     Sterilization 2011    Essure    Wears glasses      Past Surgical History:   Procedure Laterality Date    CHOLECYSTECTOMY  04    HYSTEROSCOPY, STERILIZATION  2011    Essure          x3    TONSILLECTOMY       Social History:  Social History     Social History Narrative    ** Merged History Encounter **           Social History     Socioeconomic History    Marital status:    Tobacco Use    Smoking status: Never    Smokeless tobacco: Never   Vaping Use    Vaping Use: Never used   Substance and Sexual Activity    Alcohol use: No    Drug use: No    Sexual activity: Yes     Partners: Male     Comment: Essure   Social History Narrative    ** Merged History Encounter **          Family History:  Family History   Problem Relation Age of Onset    Diabetes Mother     Hypertension Mother     Lipids Mother     Other (Other) Mother         thyroid disease    Diabetes Maternal Grandmother     Hypertension Maternal Grandmother     Heart Attack Maternal Grandfather     Diabetes Maternal Grandfather     Hypertension Maternal Grandfather     Heart Disorder Maternal Grandfather         heart attack    Diabetes Paternal Grandmother     Diabetes Paternal Grandfather     Cancer Maternal Aunt 57        lung cancer     Allergies:  Allergies   Allergen Reactions    Shrimp TONGUE SWELLING     Current Meds:  Current Outpatient Medications   Medication Sig Dispense Refill    levothyroxine 25 MCG Oral Tab Take 1 tablet (25 mcg total) by mouth before breakfast. 90 tablet 0    ALPRAZolam (XANAX) 0.25 MG Oral Tab Take 1 tablet (0.25 mg total) by mouth nightly as needed. 10 tablet 0    buPROPion  MG Oral Tablet 24 Hr Take 1 tablet (300 mg total) by mouth daily. 90 tablet 3    SUMAtriptan 25 MG Oral Tab TAKE 1 TABLET BY MOUTH AS NEEDED FOR MIGRAINE. MAY REPEAT IN 2 HOURS IF NEEDED(MAX 2 TABLETS PER DAY) AS DIRECTED 9 tablet 1    Ciclopirox 8 % External Solution Apply to affected nail nightly 6 mL 1    naproxen 500 MG Oral Tab Take 1 tablet (500 mg total) by mouth 2 (two) times daily as needed. 60 tablet 1      Counseling given: Not Answered         Problem List:  Patient Active Problem List   Diagnosis    Migraine with aura    Anxiety and depression    Attention deficit hyperactivity disorder (ADHD), predominantly inattentive type    Blood  glucose elevated    Abnormal TSH    Snoring    Atypical pigmented skin lesion    Special screening for malignant neoplasm of colon    Obstructive sleep apnea    Hypersomnia       REVIEW OF SYSTEMS:   Review of Systems    EXAM:   /72 (BP Location: Right arm, Patient Position: Sitting, Cuff Size: adult)   Pulse 87   Resp 18   Ht 5' 5\" (1.651 m)   Wt 179 lb (81.2 kg)   LMP 05/28/2012 (LMP Unknown)   SpO2 98%   BMI 29.79 kg/m²  Estimated body mass index is 29.79 kg/m² as calculated from the following:    Height as of this encounter: 5' 5\" (1.651 m).    Weight as of this encounter: 179 lb (81.2 kg).   Neck in inches:      Wt Readings from Last 6 Encounters:   01/23/24 179 lb (81.2 kg)   10/21/23 181 lb (82.1 kg)   09/05/23 184 lb (83.5 kg)   07/22/23 187 lb (84.8 kg)   02/21/23 185 lb (83.9 kg)   12/30/22 180 lb 12.8 oz (82 kg)     BP Readings from Last 3 Encounters:   01/23/24 122/72   10/21/23 106/76   07/22/23 124/78     Pulse Readings from Last 3 Encounters:   01/23/24 87   10/21/23 78   07/22/23 75     SpO2 Readings from Last 3 Encounters:   01/23/24 98%   10/21/23 99%   07/22/23 99%      Ideal body weight: 57 kg (125 lb 10.6 oz)  Adjusted ideal body weight: 66.7 kg (147 lb)    Vital signs reviewed.  Physical Exam    ASSESSMENT AND PLAN:   1. Migraine with aura and without status migrainosus, not intractable    2. Obstructive sleep apnea  The pathophysiology and mechanisms of obstructive sleep apnea were explained.  Adverse health consequences seen in association with BENITA include increased risk of cardiovascular events , cerebrovascular events, hypertension,  diabetes. Treatment options were discussed.  Positive airway pressure therapy is the gold standard.  Other options include mandibular advancement devices, evaluation of the upper airway by ear nose throat specialist, inspire therapy, and weight loss to be used in conjunction.   Cpap trial   31-90 day followup  3. Hypersomnia        There are no  Patient Instructions on file for this visit.    Independent interpretation of Sleep Download as defined above.  Continue with Rx management of Sleep apnea with PAP therapy.    COMPLIANCE is required by insurance for 4 hours a night most nights of the week.    Advised if still with sleep apnea and not using CPAP has a 7 fold increase in risk of heart attack, stroke, abnormal heart rhythm  and death,  increased risk of driving accidents.     Advised to refrain from driving when sleepy.      Recommend weight loss, and maintain and optimal BMI with Exercise 30 minutes most days to target heart rate .     Advised patient to change filters,masks,hoses  and tubes and equiptment on a  regular schedule.    Filters and seals shall be changed every 1 month,  Hoses every 3 months,   CPAP mask and humidifier chamber changed every 6 month  with the durable medical equipment provider.         Meds & Refills for this Visit:  Requested Prescriptions      No prescriptions requested or ordered in this encounter       Outcome: Parent verbalizes understanding. Parent is notified to call with any questions, complications, allergies, or worsening or changing symptoms.  Parent is to call with any side effects or complications from the treatments as a result of today.     \" This note was created utilizing Dragon speech recognition software.  Please excuse any grammatical errors. Call my office if you have any questions regarding this note. \"     Barry Nix, DO  1/23/2024  11:46 AM

## 2024-02-08 ENCOUNTER — OFFICE VISIT (OUTPATIENT)
Dept: SLEEP CENTER | Age: 49
End: 2024-02-08
Attending: Other
Payer: COMMERCIAL

## 2024-02-08 DIAGNOSIS — G47.33 OSA (OBSTRUCTIVE SLEEP APNEA): ICD-10-CM

## 2024-02-08 PROCEDURE — 95811 POLYSOM 6/>YRS CPAP 4/> PARM: CPT

## 2024-02-12 ENCOUNTER — TELEPHONE (OUTPATIENT)
Dept: INTERNAL MEDICINE CLINIC | Facility: CLINIC | Age: 49
End: 2024-02-12

## 2024-02-12 ENCOUNTER — OFFICE VISIT (OUTPATIENT)
Dept: SURGERY | Facility: CLINIC | Age: 49
End: 2024-02-12
Payer: COMMERCIAL

## 2024-02-12 VITALS
HEIGHT: 65 IN | WEIGHT: 197 LBS | HEART RATE: 77 BPM | DIASTOLIC BLOOD PRESSURE: 83 MMHG | BODY MASS INDEX: 32.82 KG/M2 | RESPIRATION RATE: 16 BRPM | OXYGEN SATURATION: 96 % | SYSTOLIC BLOOD PRESSURE: 126 MMHG | TEMPERATURE: 98 F

## 2024-02-12 DIAGNOSIS — R92.8 ABNORMAL MAMMOGRAM: Primary | ICD-10-CM

## 2024-02-12 PROCEDURE — 3079F DIAST BP 80-89 MM HG: CPT | Performed by: SURGERY

## 2024-02-12 PROCEDURE — 99244 OFF/OP CNSLTJ NEW/EST MOD 40: CPT | Performed by: SURGERY

## 2024-02-12 PROCEDURE — 3074F SYST BP LT 130 MM HG: CPT | Performed by: SURGERY

## 2024-02-12 PROCEDURE — 3008F BODY MASS INDEX DOCD: CPT | Performed by: SURGERY

## 2024-02-12 NOTE — PATIENT INSTRUCTIONS
Dr. Blanca Martell  Tel: [phone]  Fax: [phone] [place] [address]  [phone]     Surgery/Procedure: Left breast 2 site wire localized excisional biopsy     Anesthesia:   MAC  Surgery Length:   45 minutes CPT:  47468, 96618   Wire LOC:   Yes Nuc Med:   No   Nelli Seed:  No       Dx & ICD-10: Abnormal mammogram (R92.8).   Radiology Instructions: 1st site: Left breast, 7 o'clock position, 2 cm from the nipple, coil shaped clip, biopsy demonstrates radial scar.   2nd site: Left breast, 7 o'clock position, 4 cm from the nipple, vision shaped clip, biopsy demonstrates radial scar and intraductal papilloma.   _______________________________________________________________________________    Someone must accompany you the day of the procedure to drive you home safely, because of anesthesia.  You will need an adult  to stay with you the first night following your surgery.  You must remove any kind of makeup, acrylic nails, lotions, powders, creams or deodorant.  [facility] ONLY: Pre-admission will give instruct you on when to take Gatorade and Tylenol/acetaminophen prior to your surgery, purchase 2 - 12oz bottles of regular Gatorade (NOT RED/SUGAR FREE). Otherwise, you may not eat or drink anything else after 11PM the night before surgery.    [facility] ONLY: You may not eat or drink anything after midnight the day of your surgery.   Wear comfortable clothing that can be easily removed.  If you wear dentures, contacts lenses, or any prosthesis, you will be asked to remove them.  Do not drink alcohol or smoke 24 hours prior to your procedure.  Bring a picture ID and your insurance card.  Covid-19 testing is no longer required before surgery unless you are experiencing symptoms such as fever, cough, congestion, etc.   The Pre-Admission Testing Department will call the day before to confirm your procedure, give you the time you need to arrive by and directions on  where to go. They begin making calls after 2pm, if you are not contacted by 4pm, please call the surgeon's office listed above.  Do not take any blood thinners at least one week prior to the procedure/surgery. This includes aspirin, baby aspirin, Ibuprofen products, herbal supplements, diet medications, vitamin E, fish oil and green tea supplements. Please check other supplements for these ingredients. *TYLENOL or acetaminophen is acceptable*  If you take Coumadin, Plavix, Xarelto, or Eliquis, please contact your prescribing physician for special instructions on how long to hold. If you take insulin contact your primary care physician for special instructions.  Our surgery scheduler, Alyce, will be contacting you to discuss surgery dates. If you have any questions related to scheduling your surgery, please reach out to her at [phone].  _____________________________________________________________________  PRE-OPERATIVE TESTING IF INDICATED BELOW  PLEASE COMPLETE ASAP (AT LEAST 14-21 DAYS PRIOR TO SURGERY)  [] CBC [x] BMP [] CMP [x] EKG    [] PT, PTT, INR [] Cardiac Clearance  [x] H&P Medical Clearance [] Chest X-ray     Please call Central Scheduling to schedule an appointment for pre-operative labs/tests @ [phone]  Does the patient have a pacemaker or ICD?           Does the patient have sleep apnea?  [] Yes   [x] No                               [] Yes   [x] No

## 2024-02-12 NOTE — TELEPHONE ENCOUNTER
Receive fax stating pt is having surgery.  Called pt and she is scheduled on below for left breast biopsy with Dr. Blanca Martell.  Pw in red folder.  Surgery/Biopsy date not scheduled - TBD  Pt will call Dr. Martell's office to advise pre-op scheduled date.    Dr. Martell's office is [phone]    Future Appointments   Date Time Provider Department Center   2/22/2024  9:40 AM Fozia Thibodeaux MD [facility]

## 2024-02-12 NOTE — PROGRESS NOTES
Breast Surgery New Patient Consultation    This is the first visit for this 48 year old woman, referred by Dr. Thibodeaux, who presents for evaluation of abnormal imaging.    History of Present Illness:   Ms. Lorelei Clemente is a 48 year old woman who presents with imaging detected concerns of the left breast.  The patient denies any self detected clinical symptoms including palpable masses, nipple discharge, skin changes or axillary symptoms.  She does not have any known family history of breast cancer.  She has no personal prior history of breast disease or biopsies.  She had her screening mammogram 2024 and was found to have new asymmetry found in her breast bilaterally.  She had a bilateral diagnostic evaluation on 2024 and was found to have 2 areas at 7:00 in the left breast correspond to irregular nodules measuring 1 cm and 5 mm respectively which were determined indeterminant for which biopsies were recommended with otherwise benign appearing findings.  The 2 site left breast ultrasound-guided biopsy took place on 2024 and confirmed fragments of radial scar at both locations with an additional finding of papilloma at one of the sites.  She is here today for evaluation and recommendations for further therapy.        Past Medical History:   Diagnosis Date    Anxiety     Decorative tattoo     History of gestational diabetes in prior pregnancy, currently pregnant     HPV in female 2012    Migraine     Sterilization 2011    Essure    Wears glasses        Past Surgical History:   Procedure Laterality Date    CHOLECYSTECTOMY  04    HYSTEROSCOPY, STERILIZATION  2011    Essure          x3    TONSILLECTOMY         Gynecological History:  Pt is a   Pt was 24 years old at time of first pregnancy.    She has cumulative breastfeeding history of 8 months.  She achieved menarche at age 15 and LMP 10/1/2016  Age of Menopause: 40  Type: hysterectomy with  ovaries left in  She denies any history of hormone replacement therapy   She has history of oral contraceptive use for 2 years, last in 2009.  She denies infertility treatment to achieve pregnancy.    Medications:    No outpatient medications have been marked as taking for the 2/12/24 encounter (Appointment) with Blanca Martell MD.       Allergies:    Allergies   Allergen Reactions    Shrimp TONGUE SWELLING       Family History:   Family History   Problem Relation Age of Onset    Diabetes Mother     Hypertension Mother     Lipids Mother     Other (Other) Mother         thyroid disease    Diabetes Maternal Grandmother     Hypertension Maternal Grandmother     Heart Attack Maternal Grandfather     Diabetes Maternal Grandfather     Hypertension Maternal Grandfather     Heart Disorder Maternal Grandfather         heart attack    Diabetes Paternal Grandmother     Diabetes Paternal Grandfather     Cancer Maternal Aunt 57        lung cancer       She is not of Ashkenazi Sikhism ancestry.    Social History:  History   Alcohol Use No       History   Smoking Status    Never   Smokeless Tobacco    Never     Ms. Lorelei Clmeente is  with 4 children. She has 7 siblings. She is currently Employed full-time    Review of Systems:  General:   The patient denies, fever, chills, night sweats, fatigue, generalized weakness, change in appetite or weight loss.    HEENT:     The patient denies eye irritation, cataracts, redness, glaucoma, yellowing of the eyes, change in vision, color blindness, or +wearing contacts/glasses. The patient denies hearing loss, ringing in the ears, ear drainage, earaches, nasal congestion, nose bleeds, +snoring, pain in mouth/throat, hoarseness, change in voice, facial trauma.    Respiratory:  The patient denies chronic cough, phlegm, hemoptysis, pleurisy/chest pain, pneumonia, asthma, wheezing, difficulty in breathing with exertion, emphysema, chronic bronchitis, shortness of breath or  abnormal sound when breathing.     Cardiovascular:  There is no history of chest pain, chest pressure/discomfort, palpitations, irregular heartbeat, fainting or near-fainting, difficulty breathing when lying flat, SOB/Coughing at night, swelling of the legs or chest pain while walking.    Breasts:  See history of present illness    Gastrointestinal:     There is no history of difficulty or pain with swallowing, reflux symptoms, vomiting, dark or bloody stools, constipation, yellowing of the skin, indigestion, nausea, change in bowel habits, diarrhea, abdominal pain or vomiting blood.     Genitourinary:  The patient denies frequent urination, needing to get up at night to urinate, urinary hesitancy or retaining urine, painful urination, urinary incontinence, decreased urine stream, blood in the urine or vaginal/penile discharge.    Skin:    The patient denies rash, itching, skin lesions, dry skin, change in skin color or change in moles.     Hematologic/Lymphatic:  The patient denies easily bruising or bleeding or persistent swollen glands or lymph nodes.     Musculoskeletal:  The patient denies muscle aches/pain, joint pain, stiff joints, neck pain, back pain or bone pain.    Neuropsychiatric:  There is no history of migraines or severe headaches, seizure/epilepsy, speech problems, coordination problems, trembling/tremors, fainting/black outs, dizziness, memory problems, loss of sensation/numbness, problems walking, weakness, tingling or burning in hands/feet. There is no history of abusive relationship, bipolar disorder, sleep disturbance, anxiety, depression or feeling of despair.    Endocrine:    There is no history of poor/slow wound healing, weight loss/gain, fertility or hormone problems, cold intolerance, +thyroid disease.     Allergic/Immunologic:  There is no history of hives, hay fever, angioedema or anaphylaxis.    /83 (BP Location: Right arm, Patient Position: Sitting, Cuff Size: adult)   Pulse 77    Temp 98.4 °F (36.9 °C) (Temporal)   Resp 16   Ht 1.651 m (5' 5\")   Wt 89.4 kg (197 lb)   LMP 05/28/2012 (LMP Unknown)   SpO2 96%   BMI 32.78 kg/m²     Physical Exam:  The patient is an alert, oriented, well-nourished and  well-developed woman who appears her stated age. Her speech patterns and movements are normal. Her affect is appropriate.    HEENT: The head is normocephalic. The neck is supple. The thyroid is not enlarged and is without palpable masses/nodules. There are no palpable masses. The trachea is in the midline. Conjunctiva are clear, non-icteric.    Chest: The chest expands symmetrically. The lungs are clear to auscultation.    Heart: The rhythm is regular.  There are no murmurs, rubs, gallops or thrills.    Breasts:  Her breasts are symmetrical with a cup size 38D.  Right breast: The skin, nipple ,and areola appear normal. There is no skin dimpling with movement of the pectoralis. There is no nipple retraction. No nipple discharge can be elicited. The parenchyma is mildly nodular. There are no dominant masses in the breast. The axillary tail is normal.  Left breast:   The skin, nipple, and areola appear normal. There is no skin dimpling with movement of the pectoralis. There is no nipple retraction. No nipple discharge can be elicited. The parenchyma is mildly nodular. There are no dominant masses in the breast. The axillary tail is normal.    Abdomen:  The abdomen is soft, flat and non tender. The liver is not enlarged. There are no palpable masses.    Lymph Nodes:  The supraclavicular, axillary and cervical regions are free of significant lymphadenopathy.    Back: There is no vertebral column tenderness.    Skin: The skin appears normal. There are no suspicious appearing rashes or lesions.    Extremities: The extremities are without deformity, cyanosis or edema.    Impression:   Ms. Lorelei Clemente is a 48 year old woman presents with biopsy confirmed left breast radial scar and  papilloma.    Discussion and Plan:  I had a discussion with the Patient regarding her breast exam. On exam today I found her to be healing well since the biopsies with no other clinical findings.  I personally reviewed the recent imaging and pathology we discussed this at length.    We discussed the significance and implications of the radial scar as well as the papilloma and possible association with atypia and/or intraductal malignancy.  Due to these problems I recommended a 2 site left breast wire localized excisional biopsy.   The risks and possible complications of the procedure were explained to the patient and her family and she understood and agreed to the proposed plan. She was given ample opportunity for questions and those questions were answered to her satisfaction. She has been  encouraged to contact the office with any questions or concerns prior to her next appointment.

## 2024-02-14 ENCOUNTER — TELEPHONE (OUTPATIENT)
Dept: SURGERY | Facility: CLINIC | Age: 49
End: 2024-02-14

## 2024-02-14 DIAGNOSIS — R92.8 ABNORMAL MAMMOGRAM: Primary | ICD-10-CM

## 2024-02-14 NOTE — TELEPHONE ENCOUNTER
Calling pt in regards to scheduling surgery.  Informed pt that I have 03/04/2024 available at [facility] with Dr. Martell.  Pt verbalized understanding and in agreement with date and location.  All questions answered.   Encouraged pt to call or [facility] message office with any other questions or concerns.

## 2024-02-15 ENCOUNTER — SLEEP STUDY (OUTPATIENT)
Facility: CLINIC | Age: 49
End: 2024-02-15
Payer: COMMERCIAL

## 2024-02-15 DIAGNOSIS — G47.33 OBSTRUCTIVE SLEEP APNEA SYNDROME: Primary | ICD-10-CM

## 2024-02-15 PROBLEM — R92.8 ABNORMAL MAMMOGRAM: Status: ACTIVE | Noted: 2024-02-15

## 2024-02-15 PROCEDURE — 95811 POLYSOM 6/>YRS CPAP 4/> PARM: CPT | Performed by: OTHER

## 2024-02-16 ENCOUNTER — TELEPHONE (OUTPATIENT)
Dept: GENERAL RADIOLOGY | Facility: HOSPITAL | Age: 49
End: 2024-02-16

## 2024-02-16 NOTE — TELEPHONE ENCOUNTER
1545:  Lorelei Clemente returned the breast nurse coordinator's call.  Discussed localization procedure to be done [facility] prior to surgery Thursday, March 07.  Procedure and flow of the day reviewed.   Lorelei Clemente verbalized understanding. No questions at this time.   Encouraged Ms. Esteban Clemente to contact the breast center or Dr. Martell's office if she has questions or concerns prior to her surgery date.  Lorelei Clemente verbalized agreement and gratitude for the call.

## 2024-02-21 ENCOUNTER — TELEPHONE (OUTPATIENT)
Facility: CLINIC | Age: 49
End: 2024-02-21

## 2024-02-21 DIAGNOSIS — G47.33 OSA (OBSTRUCTIVE SLEEP APNEA): Primary | ICD-10-CM

## 2024-02-22 ENCOUNTER — LAB ENCOUNTER (OUTPATIENT)
Dept: LAB | Age: 49
End: 2024-02-22
Attending: INTERNAL MEDICINE
Payer: COMMERCIAL

## 2024-02-22 ENCOUNTER — OFFICE VISIT (OUTPATIENT)
Dept: INTERNAL MEDICINE CLINIC | Facility: CLINIC | Age: 49
End: 2024-02-22
Payer: COMMERCIAL

## 2024-02-22 VITALS
OXYGEN SATURATION: 97 % | HEART RATE: 75 BPM | SYSTOLIC BLOOD PRESSURE: 108 MMHG | BODY MASS INDEX: 31.63 KG/M2 | RESPIRATION RATE: 18 BRPM | HEIGHT: 65 IN | DIASTOLIC BLOOD PRESSURE: 74 MMHG | WEIGHT: 189.81 LBS | TEMPERATURE: 98 F

## 2024-02-22 DIAGNOSIS — F41.9 ANXIETY AND DEPRESSION: ICD-10-CM

## 2024-02-22 DIAGNOSIS — E74.39 GLUCOSE INTOLERANCE: ICD-10-CM

## 2024-02-22 DIAGNOSIS — F32.A ANXIETY AND DEPRESSION: ICD-10-CM

## 2024-02-22 DIAGNOSIS — Z01.818 PRE-OP EXAM: ICD-10-CM

## 2024-02-22 DIAGNOSIS — E03.8 OTHER SPECIFIED HYPOTHYROIDISM: ICD-10-CM

## 2024-02-22 DIAGNOSIS — D36.9 INTRADUCTAL PAPILLOMA: ICD-10-CM

## 2024-02-22 DIAGNOSIS — G47.33 OBSTRUCTIVE SLEEP APNEA: ICD-10-CM

## 2024-02-22 DIAGNOSIS — Z01.818 PRE-OP EXAM: Primary | ICD-10-CM

## 2024-02-22 LAB
ANION GAP SERPL CALC-SCNC: 5 MMOL/L (ref 0–18)
ATRIAL RATE: 78 BPM
BUN BLD-MCNC: 9 MG/DL (ref 9–23)
CALCIUM BLD-MCNC: 9.6 MG/DL (ref 8.5–10.1)
CHLORIDE SERPL-SCNC: 107 MMOL/L (ref 98–112)
CO2 SERPL-SCNC: 26 MMOL/L (ref 21–32)
CREAT BLD-MCNC: 0.83 MG/DL
EGFRCR SERPLBLD CKD-EPI 2021: 88 ML/MIN/1.73M2 (ref 60–?)
FASTING STATUS PATIENT QL REPORTED: NO
GLUCOSE BLD-MCNC: 130 MG/DL (ref 70–99)
OSMOLALITY SERPL CALC.SUM OF ELEC: 286 MOSM/KG (ref 275–295)
P AXIS: 28 DEGREES
P-R INTERVAL: 154 MS
POTASSIUM SERPL-SCNC: 3.9 MMOL/L (ref 3.5–5.1)
Q-T INTERVAL: 378 MS
QRS DURATION: 82 MS
QTC CALCULATION (BEZET): 430 MS
R AXIS: -13 DEGREES
SODIUM SERPL-SCNC: 138 MMOL/L (ref 136–145)
T AXIS: 21 DEGREES
VENTRICULAR RATE: 78 BPM

## 2024-02-22 PROCEDURE — 3078F DIAST BP <80 MM HG: CPT | Performed by: INTERNAL MEDICINE

## 2024-02-22 PROCEDURE — 3008F BODY MASS INDEX DOCD: CPT | Performed by: INTERNAL MEDICINE

## 2024-02-22 PROCEDURE — 3074F SYST BP LT 130 MM HG: CPT | Performed by: INTERNAL MEDICINE

## 2024-02-22 PROCEDURE — 80048 BASIC METABOLIC PNL TOTAL CA: CPT

## 2024-02-22 PROCEDURE — 99243 OFF/OP CNSLTJ NEW/EST LOW 30: CPT | Performed by: INTERNAL MEDICINE

## 2024-02-22 PROCEDURE — 93000 ELECTROCARDIOGRAM COMPLETE: CPT | Performed by: INTERNAL MEDICINE

## 2024-02-22 NOTE — PROGRESS NOTES
Lorelei Clemente is a 48 year old female.    Chief Complaint   Patient presents with    Pre-Op     Rm 3       HPI:     Pleasant patient with hypothyroidism, migraines, anxiety, intraductal papilloma left breast here for pre op exam for left breast localized excisional biopsy on 3/7/24 requested by Dr. Martell. Patient is feeling well, walks 30 minutes a day without exertional chest pains or SOB. She denies any fevers, chills, chest pains, cough, nausea, vomiting, diarrhea, focal weakness, abnormal bruising or bleeding.         Patient Active Problem List   Diagnosis    Migraine with aura    Anxiety and depression    Attention deficit hyperactivity disorder (ADHD), predominantly inattentive type    Blood glucose elevated    Abnormal TSH    Snoring    Atypical pigmented skin lesion    Special screening for malignant neoplasm of colon    Obstructive sleep apnea    Hypersomnia    Intraductal papilloma    Obesity (BMI 30-39.9)    Glucose intolerance    Other specified hypothyroidism    Abnormal mammogram     Current Outpatient Medications   Medication Sig Dispense Refill    metFORMIN 500 MG Oral Tab Take 1 tablet (500 mg total) by mouth 2 (two) times daily with meals. 30 tablet 3    levothyroxine 50 MCG Oral Tab Take 1 tablet (50 mcg total) by mouth before breakfast. 90 tablet 1    diclofenac 75 MG Oral Tab EC Take 1 tablet (75 mg total) by mouth daily as needed (pain). 30 tablet 1    ondansetron (ZOFRAN) 4 mg tablet Take 1 tablet (4 mg total) by mouth every 8 (eight) hours as needed for Nausea. 30 tablet 1    ALPRAZolam (XANAX) 0.25 MG Oral Tab Take 1 tablet (0.25 mg total) by mouth nightly as needed. 10 tablet 0    buPROPion  MG Oral Tablet 24 Hr Take 1 tablet (300 mg total) by mouth daily. 90 tablet 3    SUMAtriptan 25 MG Oral Tab TAKE 1 TABLET BY MOUTH AS NEEDED FOR MIGRAINE. MAY REPEAT IN 2 HOURS IF NEEDED(MAX 2 TABLETS PER DAY) AS DIRECTED 9 tablet 1    Ciclopirox 8 % External Solution Apply to  affected nail nightly 6 mL 1    naproxen 500 MG Oral Tab Take 1 tablet (500 mg total) by mouth 2 (two) times daily as needed. (Patient not taking: Reported on 2/22/2024) 60 tablet 1      Past Medical History:   Diagnosis Date    Anxiety     Decorative tattoo     Depression     Disorder of thyroid     History of gestational diabetes in prior pregnancy, currently pregnant (HCC)     HPV in female 05/01/2012    Migraine     Migraines     Prediabetes     Sterilization 07/01/2011    Essure    Wears glasses       Social History:  Social History     Socioeconomic History    Marital status:    Tobacco Use    Smoking status: Never    Smokeless tobacco: Never   Vaping Use    Vaping Use: Never used   Substance and Sexual Activity    Alcohol use: No    Drug use: No    Sexual activity: Yes     Partners: Male     Comment: Essure   Social History Narrative    ** Merged History Encounter **          Family History   Problem Relation Age of Onset    Diabetes Mother     Hypertension Mother     Lipids Mother     Other (Other) Mother         thyroid disease    Diabetes Maternal Grandmother     Hypertension Maternal Grandmother     Heart Attack Maternal Grandfather     Diabetes Maternal Grandfather     Hypertension Maternal Grandfather     Heart Disorder Maternal Grandfather         heart attack    Diabetes Paternal Grandmother     Diabetes Paternal Grandfather     Cancer Maternal Aunt 57        lung cancer        Allergies  Allergies   Allergen Reactions    Shrimp TONGUE SWELLING         REVIEW OF SYSTEMS:   GENERAL HEALTH:  no fevers   RESPIRATORY: no cough  CARDIOVASCULAR: denies chest pain  GI: denies abdominal pain  : no dysuria  NEURO: denies focal weakness  HEME: No adenopathy      EXAM:   /74   Pulse 75   Temp 97.5 °F (36.4 °C) (Skin)   Resp 18   Ht 5' 5\" (1.651 m)   Wt 189 lb 12.8 oz (86.1 kg)   LMP 05/28/2012 (LMP Unknown)   SpO2 97%   BMI 31.58 kg/m²   GENERAL: well developed, well nourished,in no  apparent distress  HEENT: atraumatic, normocephalic  NECK: supple,no adenopathy  LUNGS: normal rate without respiratory distress, lungs clear to auscultation  CARDIO: RRR nl S1 S2  GI: normal bowel sounds, soft, NT/ND  EXTREMITIES: no cyanosis, clubbing or edema  NEURO: Alert and oriented    EKG-NSR, nl axis and intervals, no acute ST - T wave changes, normal EKG    ASSESSMENT AND PLAN:     Encounter Diagnoses   Name     Pre-op exam- EKG and non fasting BMP are normal. Chronic medical problems are stable and she has good functional capacity.  Patient is cleared for left breast excisional biopsy without the need for further work up at this time. Please call if any questions.     Other specified hypothyroidism- stable, CPM     Obstructive sleep apnea- stable, she is following with pulmonary service     Intraductal papilloma- left breast excisional biopsy planned on 3/7/24 by Dr. Martell     Glucose intolerance- continue metformin, monitor [id number]     Anxiety and depression- controlled, CPM  Migraines- controlled, avoid diclofenac 1 week prior to procedure. Can use otc tylenol prn        Orders Placed This Encounter   Procedures    Basic Metabolic Panel (8) [E]       Meds & Refills for this Visit:  Requested Prescriptions      No prescriptions requested or ordered in this encounter       Imaging & Consults:  ELECTROCARDIOGRAM, COMPLETE    Return if symptoms worsen or fail to improve.  There are no Patient Instructions on file for this visit.      The patient indicates understanding of these issues and agrees to the plan.

## 2024-02-23 ENCOUNTER — TELEPHONE (OUTPATIENT)
Dept: INTERNAL MEDICINE CLINIC | Facility: CLINIC | Age: 49
End: 2024-02-23

## 2024-02-23 NOTE — TELEPHONE ENCOUNTER
"Contact patient.    Unfortunately, headache is a common side effects of these vaccines, experienced by anywhere from 25 to 65% of patients receiving the vaccine.    It is unsure how long this will last, but I would continue to treat conservatively with Tylenol, cold compresses, etc.     If headache becomes more severe (ie \"worse headache of life\" or new symptoms such as vision change, blurry vision, dizziness, etc), patient needs to be evaluated emergently in ER.   " Faxed pre-op clearance requirements to [phone]. Confirmation received. Paperwork placed in red folder.

## 2024-03-07 ENCOUNTER — HOSPITAL ENCOUNTER (OUTPATIENT)
Dept: MAMMOGRAPHY | Facility: HOSPITAL | Age: 49
Discharge: HOME | End: 2024-03-07
Attending: SURGERY | Admitting: SURGERY
Payer: COMMERCIAL

## 2024-03-07 ENCOUNTER — ANESTHESIA (OUTPATIENT)
Dept: SURGERY | Facility: HOSPITAL | Age: 49
End: 2024-03-07
Payer: COMMERCIAL

## 2024-03-07 ENCOUNTER — HOSPITAL ENCOUNTER (OUTPATIENT)
Facility: HOSPITAL | Age: 49
Setting detail: HOSPITAL OUTPATIENT SURGERY
Discharge: HOME | End: 2024-03-07
Attending: SURGERY | Admitting: SURGERY
Payer: COMMERCIAL

## 2024-03-07 ENCOUNTER — APPOINTMENT (OUTPATIENT)
Dept: MAMMOGRAPHY | Facility: HOSPITAL | Age: 49
End: 2024-03-07
Attending: SURGERY
Payer: COMMERCIAL

## 2024-03-07 ENCOUNTER — ANESTHESIA EVENT (OUTPATIENT)
Dept: SURGERY | Facility: HOSPITAL | Age: 49
End: 2024-03-07
Payer: COMMERCIAL

## 2024-03-07 VITALS
WEIGHT: 189 LBS | HEART RATE: 71 BPM | DIASTOLIC BLOOD PRESSURE: 66 MMHG | SYSTOLIC BLOOD PRESSURE: 124 MMHG | RESPIRATION RATE: 16 BRPM | BODY MASS INDEX: 31.11 KG/M2 | HEIGHT: 65.5 IN | TEMPERATURE: 98 F | OXYGEN SATURATION: 99 %

## 2024-03-07 DIAGNOSIS — R92.8 ABNORMAL MAMMOGRAM: Primary | ICD-10-CM

## 2024-03-07 DIAGNOSIS — R92.8 ABNORMAL MAMMOGRAM: ICD-10-CM

## 2024-03-07 LAB — GLUCOSE BLD-MCNC: 104 MG/DL (ref 70–99)

## 2024-03-07 PROCEDURE — 19286 PERQ DEV BREAST ADD US IMAG: CPT | Performed by: SURGERY

## 2024-03-07 PROCEDURE — 76098 X-RAY EXAM SURGICAL SPECIMEN: CPT | Performed by: SURGERY

## 2024-03-07 PROCEDURE — 77065 DX MAMMO INCL CAD UNI: CPT | Performed by: SURGERY

## 2024-03-07 PROCEDURE — 88307 TISSUE EXAM BY PATHOLOGIST: CPT | Performed by: SURGERY

## 2024-03-07 PROCEDURE — 0HBU0ZZ EXCISION OF LEFT BREAST, OPEN APPROACH: ICD-10-PCS | Performed by: SURGERY

## 2024-03-07 PROCEDURE — 19285 PERQ DEV BREAST 1ST US IMAG: CPT | Performed by: SURGERY

## 2024-03-07 PROCEDURE — 82962 GLUCOSE BLOOD TEST: CPT

## 2024-03-07 PROCEDURE — 88305 TISSUE EXAM BY PATHOLOGIST: CPT | Performed by: SURGERY

## 2024-03-07 RX ORDER — DIAZEPAM 5 MG/1
5 TABLET ORAL AS NEEDED
Status: DISCONTINUED | OUTPATIENT
Start: 2024-03-07 | End: 2024-03-07 | Stop reason: HOSPADM

## 2024-03-07 RX ORDER — LIDOCAINE HYDROCHLORIDE 10 MG/ML
INJECTION, SOLUTION EPIDURAL; INFILTRATION; INTRACAUDAL; PERINEURAL AS NEEDED
Status: DISCONTINUED | OUTPATIENT
Start: 2024-03-07 | End: 2024-03-07 | Stop reason: SURG

## 2024-03-07 RX ORDER — PROCHLORPERAZINE EDISYLATE 5 MG/ML
5 INJECTION INTRAMUSCULAR; INTRAVENOUS EVERY 8 HOURS PRN
Status: DISCONTINUED | OUTPATIENT
Start: 2024-03-07 | End: 2024-03-07

## 2024-03-07 RX ORDER — ONDANSETRON 2 MG/ML
4 INJECTION INTRAMUSCULAR; INTRAVENOUS EVERY 6 HOURS PRN
Status: DISCONTINUED | OUTPATIENT
Start: 2024-03-07 | End: 2024-03-07

## 2024-03-07 RX ORDER — SODIUM CHLORIDE, SODIUM LACTATE, POTASSIUM CHLORIDE, CALCIUM CHLORIDE 600; 310; 30; 20 MG/100ML; MG/100ML; MG/100ML; MG/100ML
INJECTION, SOLUTION INTRAVENOUS CONTINUOUS
Status: DISCONTINUED | OUTPATIENT
Start: 2024-03-07 | End: 2024-03-07

## 2024-03-07 RX ORDER — HYDROCODONE BITARTRATE AND ACETAMINOPHEN 5; 325 MG/1; MG/1
2 TABLET ORAL ONCE AS NEEDED
Status: DISCONTINUED | OUTPATIENT
Start: 2024-03-07 | End: 2024-03-07

## 2024-03-07 RX ORDER — DEXTROSE MONOHYDRATE 25 G/50ML
50 INJECTION, SOLUTION INTRAVENOUS
Status: DISCONTINUED | OUTPATIENT
Start: 2024-03-07 | End: 2024-03-07

## 2024-03-07 RX ORDER — DIPHENHYDRAMINE HYDROCHLORIDE 50 MG/ML
12.5 INJECTION INTRAMUSCULAR; INTRAVENOUS AS NEEDED
Status: DISCONTINUED | OUTPATIENT
Start: 2024-03-07 | End: 2024-03-07

## 2024-03-07 RX ORDER — CEFAZOLIN SODIUM/WATER 2 G/20 ML
2 SYRINGE (ML) INTRAVENOUS ONCE
Status: COMPLETED | OUTPATIENT
Start: 2024-03-07 | End: 2024-03-07

## 2024-03-07 RX ORDER — NALOXONE HYDROCHLORIDE 0.4 MG/ML
0.08 INJECTION, SOLUTION INTRAMUSCULAR; INTRAVENOUS; SUBCUTANEOUS AS NEEDED
Status: DISCONTINUED | OUTPATIENT
Start: 2024-03-07 | End: 2024-03-07

## 2024-03-07 RX ORDER — HYDROCODONE BITARTRATE AND ACETAMINOPHEN 5; 325 MG/1; MG/1
1 TABLET ORAL ONCE AS NEEDED
Status: DISCONTINUED | OUTPATIENT
Start: 2024-03-07 | End: 2024-03-07

## 2024-03-07 RX ORDER — LIDOCAINE HYDROCHLORIDE AND EPINEPHRINE 10; 10 MG/ML; UG/ML
INJECTION, SOLUTION INFILTRATION; PERINEURAL AS NEEDED
Status: DISCONTINUED | OUTPATIENT
Start: 2024-03-07 | End: 2024-03-07 | Stop reason: HOSPADM

## 2024-03-07 RX ORDER — BUPIVACAINE HYDROCHLORIDE 5 MG/ML
INJECTION, SOLUTION EPIDURAL; INTRACAUDAL AS NEEDED
Status: DISCONTINUED | OUTPATIENT
Start: 2024-03-07 | End: 2024-03-07 | Stop reason: HOSPADM

## 2024-03-07 RX ORDER — NICOTINE POLACRILEX 4 MG
15 LOZENGE BUCCAL
Status: DISCONTINUED | OUTPATIENT
Start: 2024-03-07 | End: 2024-03-07

## 2024-03-07 RX ORDER — HYDROCODONE BITARTRATE AND ACETAMINOPHEN 5; 325 MG/1; MG/1
1-2 TABLET ORAL EVERY 6 HOURS PRN
Qty: 20 TABLET | Refills: 0 | Status: SHIPPED | OUTPATIENT
Start: 2024-03-07

## 2024-03-07 RX ORDER — ACETAMINOPHEN 500 MG
1000 TABLET ORAL ONCE
Status: DISCONTINUED | OUTPATIENT
Start: 2024-03-07 | End: 2024-03-07 | Stop reason: HOSPADM

## 2024-03-07 RX ORDER — NICOTINE POLACRILEX 4 MG
30 LOZENGE BUCCAL
Status: DISCONTINUED | OUTPATIENT
Start: 2024-03-07 | End: 2024-03-07

## 2024-03-07 RX ORDER — HYDROMORPHONE HYDROCHLORIDE 1 MG/ML
0.2 INJECTION, SOLUTION INTRAMUSCULAR; INTRAVENOUS; SUBCUTANEOUS EVERY 5 MIN PRN
Status: DISCONTINUED | OUTPATIENT
Start: 2024-03-07 | End: 2024-03-07

## 2024-03-07 RX ORDER — SCOLOPAMINE TRANSDERMAL SYSTEM 1 MG/1
1 PATCH, EXTENDED RELEASE TRANSDERMAL ONCE
Status: DISCONTINUED | OUTPATIENT
Start: 2024-03-07 | End: 2024-03-07

## 2024-03-07 RX ORDER — HYDROMORPHONE HYDROCHLORIDE 1 MG/ML
0.4 INJECTION, SOLUTION INTRAMUSCULAR; INTRAVENOUS; SUBCUTANEOUS EVERY 5 MIN PRN
Status: DISCONTINUED | OUTPATIENT
Start: 2024-03-07 | End: 2024-03-07

## 2024-03-07 RX ORDER — HYDROMORPHONE HYDROCHLORIDE 1 MG/ML
0.6 INJECTION, SOLUTION INTRAMUSCULAR; INTRAVENOUS; SUBCUTANEOUS EVERY 5 MIN PRN
Status: DISCONTINUED | OUTPATIENT
Start: 2024-03-07 | End: 2024-03-07

## 2024-03-07 RX ORDER — ACETAMINOPHEN 500 MG
1000 TABLET ORAL ONCE AS NEEDED
Status: DISCONTINUED | OUTPATIENT
Start: 2024-03-07 | End: 2024-03-07

## 2024-03-07 RX ADMIN — LIDOCAINE HYDROCHLORIDE 50 MG: 10 INJECTION, SOLUTION EPIDURAL; INFILTRATION; INTRACAUDAL; PERINEURAL at 14:05:00

## 2024-03-07 RX ADMIN — CEFAZOLIN SODIUM/WATER 2 G: 2 G/20 ML SYRINGE (ML) INTRAVENOUS at 14:09:00

## 2024-03-07 RX ADMIN — SODIUM CHLORIDE, SODIUM LACTATE, POTASSIUM CHLORIDE, CALCIUM CHLORIDE: 600; 310; 30; 20 INJECTION, SOLUTION INTRAVENOUS at 14:02:00

## 2024-03-07 NOTE — ANESTHESIA PREPROCEDURE EVALUATION
PRE-OP EVALUATION    Patient Name: Lorelei Clemente    Admit Diagnosis: Abnormal mammogram [R92.8]    Pre-op Diagnosis: Abnormal mammogram [R92.8]    Left breast two site wire localized excisional biopsy    Anesthesia Procedure: Left breast two site wire localized excisional biopsy (Left)    Surgeon(s) and Role:     * Blanca Martell MD - Primary    Pre-op vitals reviewed.  Temp: 97.9 °F (36.6 °C)  Pulse: 80  Resp: 16  BP: 131/88  SpO2: 100 %  Body mass index is 30.97 kg/m².    Current medications reviewed.  Hospital Medications:   [MAR Hold] acetaminophen (Tylenol Extra Strength) tab 1,000 mg  1,000 mg Oral Once    scopolamine (Transderm-Scop) 1 MG/3DAYS patch 1 patch  1 patch Transdermal Once    lactated ringers infusion   Intravenous Continuous    ceFAZolin (Ancef) 2 g in 20mL IV syringe premix  2 g Intravenous Once    [MAR Hold] diazePAM (Valium) tab 5 mg  5 mg Oral PRN       Outpatient Medications:     Medications Prior to Admission   Medication Sig Dispense Refill Last Dose    metFORMIN 500 MG Oral Tab Take 1 tablet (500 mg total) by mouth 2 (two) times daily with meals. 30 tablet 3 3/6/2024 at 1600    levothyroxine 50 MCG Oral Tab Take 1 tablet (50 mcg total) by mouth before breakfast. 90 tablet 1 3/7/2024 at 0625    diclofenac 75 MG Oral Tab EC Take 1 tablet (75 mg total) by mouth daily as needed (pain). 30 tablet 1 2/15/2024    ondansetron (ZOFRAN) 4 mg tablet Take 1 tablet (4 mg total) by mouth every 8 (eight) hours as needed for Nausea. 30 tablet 1 Past Month    ALPRAZolam (XANAX) 0.25 MG Oral Tab Take 1 tablet (0.25 mg total) by mouth nightly as needed. 10 tablet 0 2/22/2024    buPROPion  MG Oral Tablet 24 Hr Take 1 tablet (300 mg total) by mouth daily. 90 tablet 3 3/5/2024    SUMAtriptan 25 MG Oral Tab TAKE 1 TABLET BY MOUTH AS NEEDED FOR MIGRAINE. MAY REPEAT IN 2 HOURS IF NEEDED(MAX 2 TABLETS PER DAY) AS DIRECTED 9 tablet 1 2/22/2024    Ciclopirox 8 % External Solution Apply to  affected nail nightly 6 mL 1     naproxen 500 MG Oral Tab Take 1 tablet (500 mg total) by mouth 2 (two) times daily as needed. (Patient not taking: Reported on 2024) 60 tablet 1        Allergies: Shrimp      Anesthesia Evaluation    Patient summary reviewed.    Anesthetic Complications  (-) history of anesthetic complications         GI/Hepatic/Renal                                 Cardiovascular                                                       Endo/Other           (+) hypothyroidism                       Pulmonary                    (+) sleep apnea       Neuro/Psych      (+) depression                                Past Surgical History:   Procedure Laterality Date    CHOLECYSTECTOMY  2004    HYSTERECTOMY      partial hyst    HYSTEROSCOPY, STERILIZATION  2011    Essure          x3    TONSILLECTOMY  2010     Social History     Socioeconomic History    Marital status:    Tobacco Use    Smoking status: Never    Smokeless tobacco: Never   Vaping Use    Vaping Use: Never used   Substance and Sexual Activity    Alcohol use: No    Drug use: No    Sexual activity: Yes     Partners: Male     Comment: [facility]     History   Drug Use No     Available pre-op labs reviewed.     Lab Results   Component Value Date     2024    K 3.9 2024     2024    CO2 26.0 2024    BUN 9 2024    CREATSERUM 0.83 2024     (H) 2024    CA 9.6 2024            Airway      Mallampati: II  Mouth opening: 3 FB  TM distance: 4 - 6 cm  Neck ROM: full Cardiovascular      Rhythm: regular  Rate: normal     Dental  Comment: No loose teeth reported by patient           Pulmonary      Breath sounds clear to auscultation bilaterally.               Other findings            ASA: 2   Plan: MAC  NPO status verified and patient meets guidelines.          Plan/risks discussed with: patient              Present on Admission:  **None**

## 2024-03-07 NOTE — BRIEF OP NOTE
Pre-Operative Diagnosis: Abnormal mammogram [R92.8]     Post-Operative Diagnosis: Abnormal mammogram [R92.8]      Procedure Performed:   Left breast two site wire localized excisional biopsy    Surgeon(s) and Role:     * Blanca Martell MD - Primary    Assistant(s):  Surgical Assistant.: Aide Person CSA     Surgical Findings: Clip noticed on specimen radiograph at both the medial and lateral lumpectomy sites     Specimen: Left breast medial lumpectomy, left breast lateral lumpectomy     Estimated Blood Loss: 5cc    Blanca Martell MD  3/7/2024  3:00 PM

## 2024-03-07 NOTE — H&P
History of Present Illness:   Ms. Lorelei Clemente is a 48 year old woman who presents with imaging detected concerns of the left breast.  The patient denies any self detected clinical symptoms including palpable masses, nipple discharge, skin changes or axillary symptoms.  She does not have any known family history of breast cancer.  She has no personal prior history of breast disease or biopsies.  She had her screening mammogram 2024 and was found to have new asymmetry found in her breast bilaterally.  She had a bilateral diagnostic evaluation on 2024 and was found to have 2 areas at 7:00 in the left breast correspond to irregular nodules measuring 1 cm and 5 mm respectively which were determined indeterminant for which biopsies were recommended with otherwise benign appearing findings.  The 2 site left breast ultrasound-guided biopsy took place on 2024 and confirmed fragments of radial scar at both locations with an additional finding of papilloma at one of the sites.  She is here today for evaluation and recommendations for further therapy.             Past Medical History:   Diagnosis Date    Anxiety      Decorative tattoo      History of gestational diabetes in prior pregnancy, currently pregnant      HPV in female 2012    Migraine      Sterilization 2011     Essure    Wears glasses                 Past Surgical History:   Procedure Laterality Date    CHOLECYSTECTOMY   04    HYSTEROSCOPY, STERILIZATION   2011     Essure             x3    TONSILLECTOMY            Gynecological History:  Pt is a   Pt was 24 years old at time of first pregnancy.    She has cumulative breastfeeding history of 8 months.  She achieved menarche at age 15 and LMP 10/1/2016  Age of Menopause: 40  Type: hysterectomy with ovaries left in  She denies any history of hormone replacement therapy   She has history of oral contraceptive use for 2 years, last in  2009.  She denies infertility treatment to achieve pregnancy.     Medications:    No outpatient medications have been marked as taking for the 2/12/24 encounter (Appointment) with Blanca Martell MD.         Allergies:         Allergies   Allergen Reactions    Shrimp TONGUE SWELLING         Family History:         Family History   Problem Relation Age of Onset    Diabetes Mother      Hypertension Mother      Lipids Mother      Other (Other) Mother           thyroid disease    Diabetes Maternal Grandmother      Hypertension Maternal Grandmother      Heart Attack Maternal Grandfather      Diabetes Maternal Grandfather      Hypertension Maternal Grandfather      Heart Disorder Maternal Grandfather           heart attack    Diabetes Paternal Grandmother      Diabetes Paternal Grandfather      Cancer Maternal Aunt 57         lung cancer         She is not of Ashkenazi [place] ancestry.     Social History:      History   Alcohol Use No             History   Smoking Status    Never   Smokeless Tobacco    Never      Ms. Lorelei Clemente is  with 4 children. She has 7 siblings. She is currently Employed full-time     Review of Systems:  General:   The patient denies, fever, chills, night sweats, fatigue, generalized weakness, change in appetite or weight loss.     HEENT:     The patient denies eye irritation, cataracts, redness, glaucoma, yellowing of the eyes, change in vision, color blindness, or +wearing contacts/glasses. The patient denies hearing loss, ringing in the ears, ear drainage, earaches, nasal congestion, nose bleeds, +snoring, pain in mouth/throat, hoarseness, change in voice, facial trauma.     Respiratory:  The patient denies chronic cough, phlegm, hemoptysis, pleurisy/chest pain, pneumonia, asthma, wheezing, difficulty in breathing with exertion, emphysema, chronic bronchitis, shortness of breath or abnormal sound when breathing.      Cardiovascular:  There is no history of chest pain,  chest pressure/discomfort, palpitations, irregular heartbeat, fainting or near-fainting, difficulty breathing when lying flat, SOB/Coughing at night, swelling of the legs or chest pain while walking.     Breasts:  See history of present illness     Gastrointestinal:     There is no history of difficulty or pain with swallowing, reflux symptoms, vomiting, dark or bloody stools, constipation, yellowing of the skin, indigestion, nausea, change in bowel habits, diarrhea, abdominal pain or vomiting blood.      Genitourinary:  The patient denies frequent urination, needing to get up at night to urinate, urinary hesitancy or retaining urine, painful urination, urinary incontinence, decreased urine stream, blood in the urine or vaginal/penile discharge.     Skin:    The patient denies rash, itching, skin lesions, dry skin, change in skin color or change in moles.      Hematologic/Lymphatic:  The patient denies easily bruising or bleeding or persistent swollen glands or lymph nodes.      Musculoskeletal:  The patient denies muscle aches/pain, joint pain, stiff joints, neck pain, back pain or bone pain.     Neuropsychiatric:  There is no history of migraines or severe headaches, seizure/epilepsy, speech problems, coordination problems, trembling/tremors, fainting/black outs, dizziness, memory problems, loss of sensation/numbness, problems walking, weakness, tingling or burning in hands/feet. There is no history of abusive relationship, bipolar disorder, sleep disturbance, anxiety, depression or feeling of despair.     Endocrine:    There is no history of poor/slow wound healing, weight loss/gain, fertility or hormone problems, cold intolerance, +thyroid disease.      Allergic/Immunologic:  There is no history of hives, hay fever, angioedema or anaphylaxis.     /83 (BP Location: Right arm, Patient Position: Sitting, Cuff Size: adult)   Pulse 77   Temp 98.4 °F (36.9 °C) (Temporal)   Resp 16   Ht 1.651 m (5' 5\")   Wt  89.4 kg (197 lb)   LMP 05/28/2012 (LMP Unknown)   SpO2 96%   BMI 32.78 kg/m²      Physical Exam:  The patient is an alert, oriented, well-nourished and  well-developed woman who appears her stated age. Her speech patterns and movements are normal. Her affect is appropriate.     HEENT: The head is normocephalic. The neck is supple. The thyroid is not enlarged and is without palpable masses/nodules. There are no palpable masses. The trachea is in the midline. Conjunctiva are clear, non-icteric.     Chest: The chest expands symmetrically. The lungs are clear to auscultation.     Heart: The rhythm is regular.  There are no murmurs, rubs, gallops or thrills.     Breasts:  Her breasts are symmetrical with a cup size 38D.  Right breast: The skin, nipple ,and areola appear normal. There is no skin dimpling with movement of the pectoralis. There is no nipple retraction. No nipple discharge can be elicited. The parenchyma is mildly nodular. There are no dominant masses in the breast. The axillary tail is normal.  Left breast:   The skin, nipple, and areola appear normal. There is no skin dimpling with movement of the pectoralis. There is no nipple retraction. No nipple discharge can be elicited. The parenchyma is mildly nodular. There are no dominant masses in the breast. The axillary tail is normal.     Abdomen:  The abdomen is soft, flat and non tender. The liver is not enlarged. There are no palpable masses.     Lymph Nodes:  The supraclavicular, axillary and cervical regions are free of significant lymphadenopathy.     Back: There is no vertebral column tenderness.     Skin: The skin appears normal. There are no suspicious appearing rashes or lesions.     Extremities: The extremities are without deformity, cyanosis or edema.     Impression:   Ms. Lorelei Clemente is a 48 year old woman presents with biopsy confirmed left breast radial scar and papilloma.     Discussion and Plan:  I had a discussion with the  Patient regarding her breast exam. On exam today I found her to be healing well since the biopsies with no other clinical findings.  I personally reviewed the recent imaging and pathology we discussed this at length.     We discussed the significance and implications of the radial scar as well as the papilloma and possible association with atypia and/or intraductal malignancy.  Due to these problems I recommended a 2 site left breast wire localized excisional biopsy.   The risks and possible complications of the procedure were explained to the patient and her family and she understood and agreed to the proposed plan. She was given ample opportunity for questions and those questions were answered to her satisfaction. She has been  encouraged to contact the office with any questions or concerns prior to her next appointment.     Pre-op Diagnosis: Abnormal mammogram [R92.8]    The above referenced H&P was reviewed by Blanca Martell MD on 3/7/2024, the patient was examined and no significant changes have occurred in the patient's condition since the H&P was performed.  I discussed with the patient and/or legal representative the potential benefits, risks and side effects of this procedure; the likelihood of the patient achieving goals; and potential problems that might occur during recuperation.  I discussed reasonable alternatives to the procedure, including risks, benefits and side effects related to the alternatives and risks related to not receiving this procedure.  We will proceed with procedure as planned.

## 2024-03-07 NOTE — ANESTHESIA POSTPROCEDURE EVALUATION
[facility]    Lorelei Clemente Patient Status:  Hospital Outpatient Surgery   Age/Gender 48 year old female MRN [id number]   Location [facility] PERIOPERATIVE SERVICE Attending Blanca Martell MD   Hosp Day # 0 PCP Fozia Thibodeaux MD       Anesthesia Post-op Note    Left breast two site wire localized excisional biopsy    Procedure Summary       Date: 03/07/24 Room / Location:  MAIN OR [address] [place] [address] MAIN OR    Anesthesia Start: 1402 Anesthesia Stop: 1444    Procedure: Left breast two site wire localized excisional biopsy (Left) Diagnosis:       Abnormal mammogram      (Abnormal mammogram [R92.8])    Surgeons: Blanca Martell MD Anesthesiologist: Edil Ceballos MD    Anesthesia Type: MAC ASA Status: 2            Anesthesia Type: MAC    Vitals Value Taken Time   /78 03/07/24 1448   Temp 97.8 °F (36.6 °C) 03/07/24 1448   Pulse 85 03/07/24 1448   Resp 16 03/07/24 1448   SpO2 98 % 03/07/24 1448       Patient Location: Same Day Surgery    Anesthesia Type: MAC    Airway Patency: patent    Postop Pain Control: adequate    Mental Status: mildly sedated but able to meaningfully participate in the post-anesthesia evaluation    Nausea/Vomiting: none    Cardiopulmonary/Hydration status: stable euvolemic    Complications: no apparent anesthesia related complications    Postop vital signs: stable    Comments: report given to RN    Dental Exam: Unchanged from Preop    Patient to be discharged from PACU when criteria met.

## 2024-03-07 NOTE — IMAGING NOTE
Assisted  with ultrasound guided needle localization of the left breast x two sites.  Lorelei Clemente identified with spelling of name and date of birth.   Medications and allergies reviewed. NKDA reported.     History: left breast-radial scar intraductal papilloma  Surgery: Left breast two site wire localized excisional biopsy     Order verified.  Procedure explained and questions answered. Lorelei Clemente verbalized understanding and agreement.  1131: Written consent obtained.     1142: Scans taken by Count includes the [facility]- ultrasound technologist    Awaiting radiologist availability    1223: Dr. Ramirez present    1223: Time out complete.    Site #1 Left Breast  1223: Site prepped and draped in a sterile manner by the imaging technologist..   1226: Lidocaine administered for anesthetic affect.  1228: Dwyer 20G x 5cm needle placed- Left breast, 7 o'clock position, 2 cm from the nipple, coil shaped clip, biopsy demonstrates radial scar.     Site #2 Left Breast  1223: Site prepped and draped in a sterile manner. By the imaging technologist  1228: Lidocaine administered for anesthetic affect.  1229: Dwyer 20G x 5cm needle placed-Left breast, 7 o'clock position, 4 cm from the nipple, vision shaped clip, biopsy demonstrates radial scar and intraductal papilloma.     Emotional support provided.  Lorelei Clemente tolerated procedure well.     Sites cleaned.  Wires secured with blue clips, steri strips, sterile 4x4 gauze dressing, and Tegaderm.    1240: Lorelei Clemente transported via wheelchair to mammography for post localization images in stable condition. Ms. Esteban Clemente without complaints or concerns at this time.   Report and transfer of care to mammography technologist-Evelio  Mammography staff to discharged Lorelei Clemente to surgery holding after imaging complete.

## 2024-03-07 NOTE — DISCHARGE INSTRUCTIONS
Breast Surgery  Post-operative Instructions  Excisional Biopsy  Blanca Martell MD  General Instructions  The following instructions will provide helpful information that will assist your recovery. These are designed to be general guidelines. Please remember that everyone heals and recovers differently. Listen to your body and rest when you are tired. If you have any questions or concerns, please do not hesitate to contact my office. I would like to see you in the office about one week after surgery, please schedule and appointment through my office to make a post-operative appointment if you do not already have one.     Restrictions  There are no lifting weight restrictions for the arm on the surgical side. You may gradually increase the amount of weight based on your comfort level. You should avoid a lot of repetitious activity with the arm until the wound is well-healed (about two weeks).   You should not drive a car until you believe you can react to an emergency situation and you’re no longer taking narcotic pain medications.   You may shower the day after surgery. You should not bathe or swim (i.e. submerge wound) until the wound is well healed (about two weeks).  There are no dietary restrictions.    Exercise  You may begin arm exercises within a couple days. Do these 2 or 3 times per day, beginning with light exercise and gradually increase your range of motion and repetitions. This will help your arm regain full mobility. We will address your activity level again at your post-operative visit.   You will have pain medication prescribed before discharge. Take this as directed to relieve pain. It is important that you be comfortable so that you may continue your stretching exercises.   If you find the medication prescribed is too strong, try Tylenol (Acetaminophen) or Ibuprofen.    Wound Care  You may remove the gauze dressing on the first or second postoperative day and then shower. You should leave the  steri-strips in place; they will start to peel off about 10 days after your surgery. The stitches are all underneath the skin and will dissolve on their own. You will not need any stitches removed except if you have a drain in place.  I encourage you to shower once the outer bandage is removed, you may use soap and water directly over the steri-strips and pat dry following.  You should keep gauze dressing on the wound until the wound is completely dry and without drainage-usually 1-3 days.   If a surgical bra was placed after the surgery, I encourage you to wear it as much as possible during the week following the procedure (including during sleep). Alternatively, you may choose to wear your own bra provided it is comfortable, provides support and does not have an underwire. If the breast doesn’t move it is less painful.  If an elastic bandage was placed around your chest after the surgery you may remove it on the 1st or 2nd day after surgery. If you prefer to leave it on longer, you may.  It is normal to feel a lump in the area of the incisions for up to 6 months. This is part of the healing process. Eventually the breast will return to its normal condition.       Pain Medication  You will be given a prescription for a narcotic pain medication (usually Norco) upon discharge. Many patients have very little pain and don’t want to use the narcotic. Don’t be afraid to use it if you’re uncomfortable. If you’d prefer you may substitute Tylenol or Ibuprofen (Motrin, Advil). Using an ice pack for a few minutes over the incision can also alleviate pain. If you do use the narcotic medication, use an over the counter stool softener or gentle laxative and stay well-hydrated as constipation is not uncommon with narcotics.    Pathology Report  The Pathology report is usually available 4-5 business days following the surgery. I will call you  with the results once the report is available.    Notify my office if:   Your  temperature is over 101.5 F   You notice increasing swelling, redness, warmth or drainage from around the incision or drain site.    If you experience any problems please call my office and either my nurse or myself will respond. After hours, you will be forwarded to my answering service which will help you get in touch with myself or the physician covering for me.

## 2024-03-08 NOTE — OPERATIVE REPORT
[facility]    PATIENT'S NAME: ANDREWS FINK   ATTENDING PHYSICIAN: Blanca Martell M.D.   OPERATING PHYSICIAN: Blanca Martell M.D.   PATIENT ACCOUNT#:   [id number]    LOCATION:  [facility] 11 ED 10  MEDICAL RECORD #:   [id number]       YOB: 1975  ADMISSION DATE:       03/07/2024      OPERATION DATE:  03/07/2024    OPERATIVE REPORT      PREOPERATIVE DIAGNOSIS:  Radial scar and papilloma of the left breast.  POSTOPERATIVE DIAGNOSIS:  Radial scar and papilloma of the left breast.  PROCEDURE:  Left breast 2-site wire-localized lumpectomy with left breast 2-site specimen radiography.    ASSISTANT:  Aide Person CSA.    ANESTHESIA:  Monitored anesthesia care and local.    ESTIMATED BLOOD LOSS:  5 mL.    DRAINS:  None.    COMPLICATIONS:  None.    DISPOSITION:  Stable on transfer to recovery room.    INDICATIONS:  Patient is a 48-year-old female who presents with imaging-detected concern of the left breast.  She was found to have new nodules and biopsy-confirmed radial scar and had papilloma at the other location.  She was found to heal well since the biopsy.  We discussed local treatment options and discussed the possible association with atypia and/or intraductal malignancy and to rule these problems out, I have recommended formal surgical excision of the remainder of the lesion.  The risks and possible complications were discussed with the patient including, but not limited to, infection, bleeding, injury to surrounding structures, possible need for reoperation.  She agreed to the proposed surgery.    OPERATIVE TECHNIQUE:  This patient was brought to the imaging suite.  She underwent a wire localization of both sites in the left breast.  She was then brought to the OR, placed in supine position, properly padded and secured, given a dose of IV antibiotics, and sequential compression devices were applied to her legs for DVT prophylaxis.  Monitored anesthesia care  was induced.  The left breast was prepped and draped in usual sterile fashion.  Then, 1% lidocaine with epinephrine was used to infiltrate the skin and subcutaneous tissues at the targeted incision site.  A curvilinear incision was made along the lateral areolar border.  Attention was first taken toward the medial wire.  This was identified and brought into the field.  Using sharp dissection and electrocautery, a segment of breast tissue surrounding the tip of the medial wire was carefully excised, and oriented with a short stitch single clip superiorly and a long stitch double clip laterally in order to allow for appropriate pathological margin assessment and review.  This was then placed in the imaging device where specimen x-ray confirmed the presence of the targeted vision clip with adequate margin as deemed by myself.  The wound was irrigated and hemostasis was assured with electrocautery.  Attention was taken toward the location of the lateral wire.  This was brought into the field.  Using sharp dissection and electrocautery, a segment of breast tissue surrounding the tip of the wire was carefully excised, and oriented with a short stitch single clip superiorly and a long stitch double clip laterally in order to allow for appropriate pathological margin assessment and review.  Specimen x-ray of this confirmed the presence of the targeted coil clip with adequate margin as deemed by myself.  The wound was irrigated and hemostasis was assured with electrocautery.  Wound was closed with interrupted 3-0 Vicryl for deep layer and a running 4-0 subcuticular Monocryl of the skin after reapproximating the deep breast tissue with 3-0 PDS suture.  A sterile dressing and compression bra were placed.  Then, 0.5% Marcaine was instilled in the cavity to assist with postoperative analgesia.  Blood loss was minimal.  All counts were correct at the conclusion of the procedure.  She tolerated the procedure well.  She was  transferred to recovery room in stable condition.    Dictated By Blanca Martell M.D.  d: 03/07/2024 14:49:35  t: 03/08/2024 01:14:53  [facility] [id number]  CMG/    cc: Fozia Thibodeaux M.D.

## 2024-03-14 ENCOUNTER — OFFICE VISIT (OUTPATIENT)
Dept: SURGERY | Facility: CLINIC | Age: 49
End: 2024-03-14
Payer: COMMERCIAL

## 2024-03-14 VITALS
BODY MASS INDEX: 31 KG/M2 | TEMPERATURE: 98 F | HEART RATE: 67 BPM | OXYGEN SATURATION: 97 % | DIASTOLIC BLOOD PRESSURE: 73 MMHG | WEIGHT: 188.19 LBS | SYSTOLIC BLOOD PRESSURE: 109 MMHG | RESPIRATION RATE: 18 BRPM

## 2024-03-14 DIAGNOSIS — R92.8 ABNORMAL MAMMOGRAM: Primary | ICD-10-CM

## 2024-03-14 PROCEDURE — 99024 POSTOP FOLLOW-UP VISIT: CPT

## 2024-03-14 PROCEDURE — 3074F SYST BP LT 130 MM HG: CPT

## 2024-03-14 PROCEDURE — 3078F DIAST BP <80 MM HG: CPT

## 2024-03-14 NOTE — PROGRESS NOTES
Breast Surgery Post-Operative Visit    Diagnosis: Left breast, radial scar/papilloma, s/p excisional biopsy on 3/7/2024    Stage: Cancer Staging   No matching staging information was found for the patient.      Disease Status:  Surgical treatment complete    History of Present Illness:   Ms. Lorelei Clemente is a 48 year old woman who presents with imaging detected concerns of the left breast.  The patient denies any self detected clinical symptoms including palpable masses, nipple discharge, skin changes or axillary symptoms.  She does not have any known family history of breast cancer.  She has no personal prior history of breast disease or biopsies.  She had her screening mammogram January 4, 2024 and was found to have new asymmetry found in her breast bilaterally.  She had a bilateral diagnostic evaluation on January 9, 2024 and was found to have 2 areas at 7:00 in the left breast correspond to irregular nodules measuring 1 cm and 5 mm respectively which were determined indeterminant for which biopsies were recommended with otherwise benign appearing findings.  The 2 site left breast ultrasound-guided biopsy took place on January 12, 2024 and confirmed fragments of radial scar at both locations with an additional finding of papilloma at one of the sites. She underwent left breast excisional biopsy, which occurred without complication. She is here for postoperative visit. She reports her pain is under control. She denies erythema, warmth, drainage, or fevers.   She is here today for evaluation and recommendations for further therapy.        Past Medical History:   Diagnosis Date    Anxiety     Decorative tattoo     Depression     Disorder of thyroid     History of gestational diabetes in prior pregnancy, currently pregnant (HCC)     HPV in female 05/01/2012    Migraine     Migraines     Prediabetes     Sterilization 07/01/2011    Essure    Wears glasses        Past Surgical History:   Procedure Laterality  Date    CHOLECYSTECTOMY  2004    HYSTERECTOMY      partial hyst    HYSTEROSCOPY, STERILIZATION  2011    Essure          x3    TONSILLECTOMY  2010       Gynecological History:  Pt is a   Pt was 24 years old at time of first pregnancy.    She has cumulative breastfeeding history of 8 months.  She achieved menarche at age 15 and LMP 10/1/2016  Age of Menopause: 40  Type: hysterectomy with ovaries left in  She denies any history of hormone replacement therapy   She has history of oral contraceptive use for 2 years, last in .  She denies infertility treatment to achieve pregnancy.    Medications:     metFORMIN 500 MG Oral Tab Take 1 tablet (500 mg total) by mouth 2 (two) times daily with meals. 30 tablet 3    levothyroxine 50 MCG Oral Tab Take 1 tablet (50 mcg total) by mouth before breakfast. 90 tablet 1    diclofenac 75 MG Oral Tab EC Take 1 tablet (75 mg total) by mouth daily as needed (pain). 30 tablet 1    ondansetron (ZOFRAN) 4 mg tablet Take 1 tablet (4 mg total) by mouth every 8 (eight) hours as needed for Nausea. 30 tablet 1    ALPRAZolam (XANAX) 0.25 MG Oral Tab Take 1 tablet (0.25 mg total) by mouth nightly as needed. 10 tablet 0    buPROPion  MG Oral Tablet 24 Hr Take 1 tablet (300 mg total) by mouth daily. 90 tablet 3    SUMAtriptan 25 MG Oral Tab TAKE 1 TABLET BY MOUTH AS NEEDED FOR MIGRAINE. MAY REPEAT IN 2 HOURS IF NEEDED(MAX 2 TABLETS PER DAY) AS DIRECTED 9 tablet 1    Ciclopirox 8 % External Solution Apply to affected nail nightly 6 mL 1    naproxen 500 MG Oral Tab Take 1 tablet (500 mg total) by mouth 2 (two) times daily as needed. 60 tablet 1       Allergies:    Allergies   Allergen Reactions    Shrimp TONGUE SWELLING       Family History:   Family History   Problem Relation Age of Onset    Diabetes Mother     Hypertension Mother     Lipids Mother     Other (Other) Mother         thyroid disease    Diabetes Maternal Grandmother     Hypertension Maternal Grandmother      Heart Attack Maternal Grandfather     Diabetes Maternal Grandfather     Hypertension Maternal Grandfather     Heart Disorder Maternal Grandfather         heart attack    Diabetes Paternal Grandmother     Diabetes Paternal Grandfather     Cancer Maternal Aunt 57        lung cancer       She is not of Ashkenazi Yazidi ancestry.    Social History:  History   Alcohol Use No       History   Smoking Status    Never   Smokeless Tobacco    Never     Ms. Lorelei Clemente is  with 4 children. She has 7 siblings. She is currently Employed full-time    Review of Systems:  General:   The patient denies, fever, chills, night sweats, fatigue, generalized weakness, change in appetite or weight loss.    HEENT:     The patient denies eye irritation, cataracts, redness, glaucoma, yellowing of the eyes, change in vision, color blindness, or +wearing contacts/glasses. The patient denies hearing loss, ringing in the ears, ear drainage, earaches, nasal congestion, nose bleeds, +snoring, pain in mouth/throat, hoarseness, change in voice, facial trauma.    Respiratory:  The patient denies chronic cough, phlegm, hemoptysis, pleurisy/chest pain, pneumonia, asthma, wheezing, difficulty in breathing with exertion, emphysema, chronic bronchitis, shortness of breath or abnormal sound when breathing.     Cardiovascular:  There is no history of chest pain, chest pressure/discomfort, palpitations, irregular heartbeat, fainting or near-fainting, difficulty breathing when lying flat, SOB/Coughing at night, swelling of the legs or chest pain while walking.    Breasts:  See history of present illness    Gastrointestinal:     There is no history of difficulty or pain with swallowing, reflux symptoms, vomiting, dark or bloody stools, constipation, yellowing of the skin, indigestion, nausea, change in bowel habits, diarrhea, abdominal pain or vomiting blood.     Genitourinary:  The patient denies frequent urination, needing to get up at  night to urinate, urinary hesitancy or retaining urine, painful urination, urinary incontinence, decreased urine stream, blood in the urine or vaginal/penile discharge.    Skin:    The patient denies rash, itching, skin lesions, dry skin, change in skin color or change in moles.     Hematologic/Lymphatic:  The patient denies easily bruising or bleeding or persistent swollen glands or lymph nodes.     Musculoskeletal:  The patient denies muscle aches/pain, joint pain, stiff joints, neck pain, back pain or bone pain.    Neuropsychiatric:  There is no history of migraines or severe headaches, seizure/epilepsy, speech problems, coordination problems, trembling/tremors, fainting/black outs, dizziness, memory problems, loss of sensation/numbness, problems walking, weakness, tingling or burning in hands/feet. There is no history of abusive relationship, bipolar disorder, sleep disturbance, anxiety, depression or feeling of despair.    Endocrine:    There is no history of poor/slow wound healing, weight loss/gain, fertility or hormone problems, cold intolerance, +thyroid disease.     Allergic/Immunologic:  There is no history of hives, hay fever, angioedema or anaphylaxis.    /73 (BP Location: Left arm, Patient Position: Sitting, Cuff Size: adult)   Pulse 67   Temp 98.4 °F (36.9 °C) (Temporal)   Resp 18   Wt 85.4 kg (188 lb 3.2 oz)   LMP 05/28/2012 (LMP Unknown)   SpO2 97%   BMI 30.84 kg/m²     Physical Exam:  The patient is an alert, oriented, well-nourished and  well-developed woman who appears her stated age. Her speech patterns and movements are normal. Her affect is appropriate.    HEENT: The head is normocephalic. The neck is supple. The thyroid is not enlarged and is without palpable masses/nodules. There are no palpable masses. The trachea is in the midline. Conjunctiva are clear, non-icteric.    Chest: The chest expands symmetrically. The lungs are clear to auscultation.    Heart: The rhythm is  regular.  There are no murmurs, rubs, gallops or thrills.    Breasts:  Her breasts are symmetrical with a cup size 38D.  Right breast: The skin, nipple ,and areola appear normal. There is no skin dimpling with movement of the pectoralis. There is no nipple retraction. No nipple discharge can be elicited. The parenchyma is mildly nodular. There are no dominant masses in the breast. The axillary tail is normal.  Left breast:   The skin, nipple, and areola appear normal. There is no skin dimpling with movement of the pectoralis. There is no nipple retraction. No nipple discharge can be elicited. The parenchyma is mildly nodular. There are no dominant masses in the breast. The axillary tail is normal.    Abdomen:  The abdomen is soft, flat and non tender. The liver is not enlarged. There are no palpable masses.    Lymph Nodes:  The supraclavicular, axillary and cervical regions are free of significant lymphadenopathy.    Back: There is no vertebral column tenderness.    Skin: The skin appears normal. There are no suspicious appearing rashes or lesions.    Extremities: The extremities are without deformity, cyanosis or edema.    Impression:   Ms. Lorelei Clemente is a 48 year old woman presents with biopsy confirmed left breast radial scar and papilloma, s/p excisional biopsy    Recommendations:   I had a discussion with the Patient regarding her breast exam.  She is healing well since surgery with no signs of infection. I reviewed her pathology. She will follow up with Dr. Martell for a second post operative visit. She was given ample opportunity for questions and those questions were answered to her satisfaction. She was encouraged to contact the office with any questions or concerns prior to her next scheduled appointment.

## 2024-03-19 ENCOUNTER — OFFICE VISIT (OUTPATIENT)
Dept: SURGERY | Facility: CLINIC | Age: 49
End: 2024-03-19
Payer: COMMERCIAL

## 2024-03-19 VITALS
HEART RATE: 82 BPM | OXYGEN SATURATION: 97 % | HEIGHT: 65.5 IN | RESPIRATION RATE: 16 BRPM | BODY MASS INDEX: 31.6 KG/M2 | WEIGHT: 192 LBS | SYSTOLIC BLOOD PRESSURE: 121 MMHG | TEMPERATURE: 98 F | DIASTOLIC BLOOD PRESSURE: 84 MMHG

## 2024-03-19 DIAGNOSIS — R92.8 ABNORMAL MAMMOGRAM OF LEFT BREAST: Primary | ICD-10-CM

## 2024-03-19 PROCEDURE — 3074F SYST BP LT 130 MM HG: CPT | Performed by: SURGERY

## 2024-03-19 PROCEDURE — 3079F DIAST BP 80-89 MM HG: CPT | Performed by: SURGERY

## 2024-03-19 PROCEDURE — 99024 POSTOP FOLLOW-UP VISIT: CPT | Performed by: SURGERY

## 2024-03-19 PROCEDURE — 3008F BODY MASS INDEX DOCD: CPT | Performed by: SURGERY

## 2024-03-21 PROBLEM — R92.8 ABNORMAL MAMMOGRAM OF LEFT BREAST: Status: ACTIVE | Noted: 2024-03-21

## 2024-04-03 ENCOUNTER — MED REC SCAN ONLY (OUTPATIENT)
Facility: CLINIC | Age: 49
End: 2024-04-03

## 2024-04-06 DIAGNOSIS — G43.109 MIGRAINE WITH AURA AND WITHOUT STATUS MIGRAINOSUS, NOT INTRACTABLE: ICD-10-CM

## 2024-04-08 RX ORDER — DICLOFENAC SODIUM 75 MG/1
75 TABLET, DELAYED RELEASE ORAL DAILY PRN
Qty: 30 TABLET | Refills: 1 | Status: SHIPPED | OUTPATIENT
Start: 2024-04-08

## 2024-04-15 ENCOUNTER — TELEPHONE (OUTPATIENT)
Dept: INTERNAL MEDICINE CLINIC | Facility: CLINIC | Age: 49
End: 2024-04-15

## 2024-04-15 NOTE — TELEPHONE ENCOUNTER
Seen by podiatry with [facility]. Unable to locate in care everywhere. She asked if missed work would come from [facility] for note or if that would come from podiatry regarding time off requests. Advised if related to workup and management of podiatry then this would be discussed with them. She voiced understanding.

## 2024-04-15 NOTE — TELEPHONE ENCOUNTER
Pt wants to take time off work she is asking to speak to the nurse. I explained the process of sending the request via [employer] providing paperwork to be completed. Pt has a few other questions insists on speaking to the nurse about it.

## 2024-04-17 ENCOUNTER — HOSPITAL ENCOUNTER (OUTPATIENT)
Dept: CT IMAGING | Age: 49
Discharge: HOME | End: 2024-04-17
Attending: PODIATRIST
Payer: COMMERCIAL

## 2024-04-17 DIAGNOSIS — D16.21 BENIGN NEOPLASM OF LONG BONE OF RIGHT LOWER EXTREMITY: ICD-10-CM

## 2024-04-17 LAB
CREAT BLD-MCNC: 0.6 MG/DL
EGFRCR SERPLBLD CKD-EPI 2021: 112 ML/MIN/1.73M2 (ref 60–?)

## 2024-04-17 PROCEDURE — 82565 ASSAY OF CREATININE: CPT

## 2024-04-17 PROCEDURE — 73702 CT LWR EXTREMITY W/O&W/DYE: CPT | Performed by: PODIATRIST

## 2024-04-17 PROCEDURE — 76377 3D RENDER W/INTRP POSTPROCES: CPT | Performed by: PODIATRIST

## 2024-04-26 ENCOUNTER — OFFICE VISIT (OUTPATIENT)
Dept: INTERNAL MEDICINE CLINIC | Facility: CLINIC | Age: 49
End: 2024-04-26
Payer: COMMERCIAL

## 2024-04-26 ENCOUNTER — LAB ENCOUNTER (OUTPATIENT)
Dept: LAB | Age: 49
End: 2024-04-26
Attending: INTERNAL MEDICINE
Payer: COMMERCIAL

## 2024-04-26 VITALS
OXYGEN SATURATION: 100 % | DIASTOLIC BLOOD PRESSURE: 72 MMHG | WEIGHT: 193 LBS | HEIGHT: 65.5 IN | RESPIRATION RATE: 16 BRPM | BODY MASS INDEX: 31.77 KG/M2 | SYSTOLIC BLOOD PRESSURE: 110 MMHG | TEMPERATURE: 97 F | HEART RATE: 95 BPM

## 2024-04-26 DIAGNOSIS — E03.8 OTHER SPECIFIED HYPOTHYROIDISM: ICD-10-CM

## 2024-04-26 DIAGNOSIS — G43.109 MIGRAINE WITH AURA AND WITHOUT STATUS MIGRAINOSUS, NOT INTRACTABLE: ICD-10-CM

## 2024-04-26 DIAGNOSIS — E74.39 GLUCOSE INTOLERANCE: ICD-10-CM

## 2024-04-26 DIAGNOSIS — F32.A ANXIETY AND DEPRESSION: ICD-10-CM

## 2024-04-26 DIAGNOSIS — F41.9 ANXIETY AND DEPRESSION: ICD-10-CM

## 2024-04-26 DIAGNOSIS — M72.2 PLANTAR FASCIITIS, RIGHT: Primary | ICD-10-CM

## 2024-04-26 DIAGNOSIS — R73.9 BLOOD GLUCOSE ELEVATED: ICD-10-CM

## 2024-04-26 LAB
ANION GAP SERPL CALC-SCNC: 5 MMOL/L (ref 0–18)
BUN BLD-MCNC: 6 MG/DL (ref 9–23)
CALCIUM BLD-MCNC: 9.6 MG/DL (ref 8.5–10.1)
CHLORIDE SERPL-SCNC: 109 MMOL/L (ref 98–112)
CO2 SERPL-SCNC: 26 MMOL/L (ref 21–32)
CREAT BLD-MCNC: 0.87 MG/DL
EGFRCR SERPLBLD CKD-EPI 2021: 83 ML/MIN/1.73M2 (ref 60–?)
EST. AVERAGE GLUCOSE BLD GHB EST-MCNC: 134 MG/DL (ref 68–126)
FASTING STATUS PATIENT QL REPORTED: YES
GLUCOSE BLD-MCNC: 119 MG/DL (ref 70–99)
HBA1C MFR BLD: 6.3 % (ref ?–5.7)
OSMOLALITY SERPL CALC.SUM OF ELEC: 289 MOSM/KG (ref 275–295)
POTASSIUM SERPL-SCNC: 4.6 MMOL/L (ref 3.5–5.1)
SODIUM SERPL-SCNC: 140 MMOL/L (ref 136–145)
T4 FREE SERPL-MCNC: 1.1 NG/DL (ref 0.8–1.7)
TSI SER-ACNC: 4.3 MIU/ML (ref 0.36–3.74)

## 2024-04-26 PROCEDURE — 84443 ASSAY THYROID STIM HORMONE: CPT | Performed by: INTERNAL MEDICINE

## 2024-04-26 PROCEDURE — 3008F BODY MASS INDEX DOCD: CPT | Performed by: INTERNAL MEDICINE

## 2024-04-26 PROCEDURE — 99214 OFFICE O/P EST MOD 30 MIN: CPT | Performed by: INTERNAL MEDICINE

## 2024-04-26 PROCEDURE — 3078F DIAST BP <80 MM HG: CPT | Performed by: INTERNAL MEDICINE

## 2024-04-26 PROCEDURE — 80048 BASIC METABOLIC PNL TOTAL CA: CPT | Performed by: INTERNAL MEDICINE

## 2024-04-26 PROCEDURE — 3074F SYST BP LT 130 MM HG: CPT | Performed by: INTERNAL MEDICINE

## 2024-04-26 PROCEDURE — 84439 ASSAY OF FREE THYROXINE: CPT | Performed by: INTERNAL MEDICINE

## 2024-04-26 PROCEDURE — 83036 HEMOGLOBIN GLYCOSYLATED A1C: CPT | Performed by: INTERNAL MEDICINE

## 2024-04-26 RX ORDER — BUPROPION HYDROCHLORIDE 300 MG/1
300 TABLET ORAL DAILY
Qty: 90 TABLET | Refills: 3 | Status: SHIPPED | OUTPATIENT
Start: 2024-04-26

## 2024-04-26 RX ORDER — METFORMIN HYDROCHLORIDE 500 MG/1
1000 TABLET, EXTENDED RELEASE ORAL
Qty: 180 TABLET | Refills: 1 | Status: SHIPPED | OUTPATIENT
Start: 2024-04-26

## 2024-04-26 RX ORDER — LEVOTHYROXINE SODIUM 0.07 MG/1
75 TABLET ORAL
Qty: 90 TABLET | Refills: 1 | Status: SHIPPED | OUTPATIENT
Start: 2024-04-26

## 2024-04-26 NOTE — PROGRESS NOTES
Lorelei Clemente is a 48 year old female.    Chief Complaint   Patient presents with    Test Results     CT scan    Follow - Up     Medication for pre dm, thyroid. Foot pain 8/10       HPI:     Pleasant patient with anxiety, depression, pre dm, hypothyroidism, BENITA on CPAP here for follow up.  Right foot pain is 8/10 when she walks, had cortisone injection 2 weeks ago for plantar fasciitis.  Had CT foot done, results as below-  CONCLUSION:    1. No acute process.   2. 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   3. There are 2 well corticated ossicles incidentally noted along the Lisfranc joint space as described above.   4. Mild calcaneal enthesopathy.   Results reviewed with her, patient will follow up with podiatry but wants to start formal PT.   C/o weight gain since the last few months, up about 5 pounds on our scale. She is compliant with levothyroxine 50mcg daily, wondering if higher dose is needed. Tsh is pending  Pre dm- on metformin but giving her loose stools, discussed changing to metformin ER, last hgba1c 6 and one from today is still being processed.  Depression and anxiety symptoms are well controlled, she would like a refill on bupropion.    Patient Active Problem List   Diagnosis    Migraine with aura    Anxiety and depression    Attention deficit hyperactivity disorder (ADHD), predominantly inattentive type    Blood glucose elevated    Abnormal TSH    Snoring    Atypical pigmented skin lesion    Special screening for malignant neoplasm of colon    Obstructive sleep apnea    Hypersomnia    Intraductal papilloma    Obesity (BMI 30-39.9)    Glucose intolerance    Other specified hypothyroidism    Abnormal mammogram    Abnormal mammogram of left breast     Current Outpatient Medications   Medication Sig Dispense Refill    buPROPion  MG Oral Tablet 24 Hr Take 1 tablet (300 mg total) by mouth daily. 90 tablet 3    metFORMIN ER  500 MG Oral Tablet 24 Hr Take 2 tablets (1,000 mg total) by mouth daily with breakfast. 180 tablet 1    levothyroxine 75 MCG Oral Tab Take 1 tablet (75 mcg total) by mouth before breakfast. 90 tablet 1    DICLOFENAC 75 MG Oral Tab EC TAKE 1 TABLET(75 MG) BY MOUTH DAILY AS NEEDED FOR PAIN 30 tablet 1    ondansetron (ZOFRAN) 4 mg tablet Take 1 tablet (4 mg total) by mouth every 8 (eight) hours as needed for Nausea. 30 tablet 1    ALPRAZolam (XANAX) 0.25 MG Oral Tab Take 1 tablet (0.25 mg total) by mouth nightly as needed. 10 tablet 0    SUMAtriptan 25 MG Oral Tab TAKE 1 TABLET BY MOUTH AS NEEDED FOR MIGRAINE. MAY REPEAT IN 2 HOURS IF NEEDED(MAX 2 TABLETS PER DAY) AS DIRECTED 9 tablet 1    naproxen 500 MG Oral Tab Take 1 tablet (500 mg total) by mouth 2 (two) times daily as needed. 60 tablet 1    Ciclopirox 8 % External Solution Apply to affected nail nightly (Patient not taking: Reported on 4/26/2024) 6 mL 1      Past Medical History:    Anxiety    Decorative tattoo    Depression    Disorder of thyroid    History of gestational diabetes in prior pregnancy, currently pregnant ([facility])    HPV in female    Migraine    Migraines    Prediabetes    Sterilization    Essure    Wears glasses      Social History:  Social History     Socioeconomic History    Marital status:    Tobacco Use    Smoking status: Never    Smokeless tobacco: Never   Vaping Use    Vaping status: Never Used   Substance and Sexual Activity    Alcohol use: No    Drug use: No    Sexual activity: Yes     Partners: Male     Comment: Essure   Social History Narrative    ** Merged History Encounter **          Family History   Problem Relation Age of Onset    Diabetes Mother     Hypertension Mother     Lipids Mother     Other (Other) Mother         thyroid disease    Diabetes Maternal Grandmother     Hypertension Maternal Grandmother     Heart Attack Maternal Grandfather     Diabetes Maternal Grandfather     Hypertension Maternal Grandfather     Heart  Disorder Maternal Grandfather         heart attack    Diabetes Paternal Grandmother     Diabetes Paternal Grandfather     Cancer Maternal Aunt 57        lung cancer        Allergies  Allergies   Allergen Reactions    Shrimp TONGUE SWELLING         REVIEW OF SYSTEMS:   GENERAL HEALTH:  no fevers +weight gain  RESPIRATORY: no cough  CARDIOVASCULAR: denies chest pain  GI: denies abdominal pain  : no dysuria  NEURO: denies headaches  PSYCH: No reported depression   MS: right foot pain with walking  HEME: No adenopathy      EXAM:   /72   Pulse 95   Temp 97.2 °F (36.2 °C)   Resp 16   Ht 5' 5.5\" (1.664 m)   Wt 193 lb (87.5 kg)   LMP 05/28/2012 (LMP Unknown)   SpO2 100%   BMI 31.63 kg/m²   GENERAL: well developed, well nourished,in no apparent distress  HEENT: atraumatic, normocephalic  LUNGS: normal rate without respiratory distress, lungs clear to auscultation  CARDIO: RRR nl S1 S2  GI: normal bowel sounds, soft, NT/ND  EXTREMITIES: no cyanosis, clubbing or edema  Right foot with TTP plantar fascia  NEURO: Alert and oriented    ASSESSMENT AND PLAN:     Encounter Diagnoses   Name     Anxiety and depression- well controlled, CPM     Plantar fasciitis, right- advised to follow up with podiatry (she had CSI about 2 weeks ago), referred to PT     Other specified hypothyroidism- gaining weight, TSH is elevated. Will increase levothyroxine to 75mcg daily and repeat TSH in 3 months     Blood glucose elevated, pre dm- discussed diet, exercise limited due to foot pain. Will change metformin to metformin ER due to loose stools. Monitor hgba1c        Orders Placed This Encounter   Procedures    TSH and Free T4 [E]    Hemoglobin A1C [E]       Meds & Refills for this Visit:  Requested Prescriptions     Signed Prescriptions Disp Refills    buPROPion  MG Oral Tablet 24 Hr 90 tablet 3     Sig: Take 1 tablet (300 mg total) by mouth daily.    metFORMIN  MG Oral Tablet 24 Hr 180 tablet 1     Sig: Take 2 tablets  (1,000 mg total) by mouth daily with breakfast.    levothyroxine 75 MCG Oral Tab 90 tablet 1     Sig: Take 1 tablet (75 mcg total) by mouth before breakfast.       Imaging & Consults:  OP REFERRAL TO [facility]    Return in about 3 months (around 7/26/2024), or if symptoms worsen or fail to improve, for annual visit.  There are no Patient Instructions on file for this visit.      The patient indicates understanding of these issues and agrees to the plan.

## 2024-05-12 ENCOUNTER — OFFICE VISIT (OUTPATIENT)
Dept: FAMILY MEDICINE CLINIC | Facility: CLINIC | Age: 49
End: 2024-05-12
Payer: COMMERCIAL

## 2024-05-12 VITALS
SYSTOLIC BLOOD PRESSURE: 132 MMHG | TEMPERATURE: 98 F | OXYGEN SATURATION: 98 % | RESPIRATION RATE: 16 BRPM | DIASTOLIC BLOOD PRESSURE: 80 MMHG | HEIGHT: 66 IN | WEIGHT: 190 LBS | HEART RATE: 82 BPM | BODY MASS INDEX: 30.53 KG/M2

## 2024-05-12 DIAGNOSIS — J06.9 UPPER RESPIRATORY TRACT INFECTION, UNSPECIFIED TYPE: Primary | ICD-10-CM

## 2024-05-12 DIAGNOSIS — J02.9 SORE THROAT: ICD-10-CM

## 2024-05-12 DIAGNOSIS — R05.2 SUBACUTE COUGH: ICD-10-CM

## 2024-05-12 LAB
CONTROL LINE PRESENT WITH A CLEAR BACKGROUND (YES/NO): YES YES/NO
KIT LOT #: NORMAL NUMERIC
STREP GRP A CUL-SCR: NEGATIVE

## 2024-05-12 PROCEDURE — 3079F DIAST BP 80-89 MM HG: CPT | Performed by: NURSE PRACTITIONER

## 2024-05-12 PROCEDURE — 87880 STREP A ASSAY W/OPTIC: CPT | Performed by: NURSE PRACTITIONER

## 2024-05-12 PROCEDURE — 3075F SYST BP GE 130 - 139MM HG: CPT | Performed by: NURSE PRACTITIONER

## 2024-05-12 PROCEDURE — 3008F BODY MASS INDEX DOCD: CPT | Performed by: NURSE PRACTITIONER

## 2024-05-12 PROCEDURE — 99213 OFFICE O/P EST LOW 20 MIN: CPT | Performed by: NURSE PRACTITIONER

## 2024-05-12 RX ORDER — ALBUTEROL SULFATE 90 UG/1
2 AEROSOL, METERED RESPIRATORY (INHALATION) EVERY 4 HOURS PRN
Qty: 1 EACH | Refills: 0 | Status: SHIPPED | OUTPATIENT
Start: 2024-05-12

## 2024-05-12 RX ORDER — BENZONATATE 200 MG/1
200 CAPSULE ORAL 3 TIMES DAILY PRN
Qty: 45 CAPSULE | Refills: 0 | Status: SHIPPED | OUTPATIENT
Start: 2024-05-12

## 2024-05-12 NOTE — PROGRESS NOTES
CHIEF COMPLAINT:     Chief Complaint   Patient presents with    Cold     Last Saturday, congestion, cough, fever 101, sore throat  OTC advil       HPI:   Lorelei Clemente is a 48 year old female who presents for upper respiratory symptoms for  1 weeks. Patient reports sore throat, congestion, fever with Tmax to 101, dry cough. Symptoms have been improving since onset.  Treating symptoms with ibuprofen.      Current Outpatient Medications   Medication Sig Dispense Refill    albuterol 108 (90 Base) MCG/ACT Inhalation Aero Soln Inhale 2 puffs into the lungs every 4 (four) hours as needed for Wheezing (cough). 1 each 0    benzonatate 200 MG Oral Cap Take 1 capsule (200 mg total) by mouth 3 (three) times daily as needed for cough. 45 capsule 0    buPROPion  MG Oral Tablet 24 Hr Take 1 tablet (300 mg total) by mouth daily. 90 tablet 3    metFORMIN  MG Oral Tablet 24 Hr Take 2 tablets (1,000 mg total) by mouth daily with breakfast. 180 tablet 1    levothyroxine 75 MCG Oral Tab Take 1 tablet (75 mcg total) by mouth before breakfast. 90 tablet 1    DICLOFENAC 75 MG Oral Tab EC TAKE 1 TABLET(75 MG) BY MOUTH DAILY AS NEEDED FOR PAIN 30 tablet 1    ondansetron (ZOFRAN) 4 mg tablet Take 1 tablet (4 mg total) by mouth every 8 (eight) hours as needed for Nausea. 30 tablet 1    ALPRAZolam (XANAX) 0.25 MG Oral Tab Take 1 tablet (0.25 mg total) by mouth nightly as needed. 10 tablet 0    SUMAtriptan 25 MG Oral Tab TAKE 1 TABLET BY MOUTH AS NEEDED FOR MIGRAINE. MAY REPEAT IN 2 HOURS IF NEEDED(MAX 2 TABLETS PER DAY) AS DIRECTED 9 tablet 1    Ciclopirox 8 % External Solution Apply to affected nail nightly (Patient not taking: Reported on 4/26/2024) 6 mL 1    naproxen 500 MG Oral Tab Take 1 tablet (500 mg total) by mouth 2 (two) times daily as needed. 60 tablet 1      Past Medical History:    Anxiety    Decorative tattoo    Depression    Disorder of thyroid    History of gestational diabetes in prior pregnancy,  currently pregnant ([facility])    HPV in female    Migraine    Migraines    Prediabetes    Sterilization    Essure    Wears glasses      Past Surgical History:   Procedure Laterality Date    Cholecystectomy  2004    Hysterectomy      partial hyst    Hysteroscopy, sterilization  2011    Essure          x3    Tonsillectomy  2010         Social History     Socioeconomic History    Marital status:    Tobacco Use    Smoking status: Never    Smokeless tobacco: Never   Vaping Use    Vaping status: Never Used   Substance and Sexual Activity    Alcohol use: No    Drug use: No    Sexual activity: Yes     Partners: Male     Comment: Essure   Social History Narrative    ** Merged History Encounter **              REVIEW OF SYSTEMS:   GENERAL: normal appetite  SKIN: no rashes or abnormal skin lesions  HEENT: See HPI  LUNGS: See HPI  CARDIOVASCULAR: denies chest pain or palpitations   GI: denies N/V/C or abdominal pain      EXAM:   /80   Pulse 82   Temp 98.2 °F (36.8 °C)   Resp 16   Ht 5' 6\" (1.676 m)   Wt 190 lb (86.2 kg)   LMP 2012 (LMP Unknown)   SpO2 98%   BMI 30.67 kg/m²   GENERAL: well developed, well nourished,in no apparent distress  SKIN: no rashes,no suspicious lesions  HEAD: atraumatic, normocephalic.  no tenderness on palpation of  sinuses  EYES: conjunctiva clear, EOM intact  EARS: TM's pearly, no bulging, no retraction,no fluid, bony landmarks visible  NOSE: Nostrils patent, no nasal discharge, nasal mucosa pink and inflamed   THROAT: Oral mucosa pink, moist. Posterior pharynx is  erythematous. no exudates. Tonsils 0/4.    NECK: Supple, non-tender  LUNGS: clear to auscultation bilaterally, +cough, no wheezes or rhonchi. Breathing is non labored.  CARDIO: RRR without murmur  EXTREMITIES: no cyanosis, clubbing or edema  LYMPH:  no lymphadenopathy.        ASSESSMENT AND PLAN:   Lorelei Clemente is a 48 year old female who presents with upper respiratory symptoms that  are consistent with    ASSESSMENT:   Encounter Diagnoses   Name Primary?    Upper respiratory tract infection, unspecified type Yes    Subacute cough     Sore throat        PLAN: Meds as below.  Comfort care as described in Patient Instructions  Educated on viral vs bacterial  Educated on supportive measures: ibuprofen/tylenol, hydration, flonase, zyrtec, delsym for cough if needed  Educated on s/s of worsening sx and when to seek higher level of care  Follow up with pcp if not improving      Meds & Refills for this Visit:  Requested Prescriptions     Signed Prescriptions Disp Refills    albuterol 108 (90 Base) MCG/ACT Inhalation Aero Soln 1 each 0     Sig: Inhale 2 puffs into the lungs every 4 (four) hours as needed for Wheezing (cough).    benzonatate 200 MG Oral Cap 45 capsule 0     Sig: Take 1 capsule (200 mg total) by mouth 3 (three) times daily as needed for cough.     Risks, benefits, and side effects of medication explained and discussed.    The patient indicates understanding of these issues and agrees to the plan.  The patient is asked to f/u with PCP if sx's persist or worsen.  There are no Patient Instructions on file for this visit.

## 2024-06-05 ENCOUNTER — OFFICE VISIT (OUTPATIENT)
Facility: CLINIC | Age: 49
End: 2024-06-05
Payer: COMMERCIAL

## 2024-06-05 VITALS
SYSTOLIC BLOOD PRESSURE: 124 MMHG | HEART RATE: 83 BPM | RESPIRATION RATE: 16 BRPM | DIASTOLIC BLOOD PRESSURE: 78 MMHG | HEIGHT: 66 IN | WEIGHT: 190 LBS | BODY MASS INDEX: 30.53 KG/M2 | OXYGEN SATURATION: 98 %

## 2024-06-05 DIAGNOSIS — G47.33 OBSTRUCTIVE SLEEP APNEA: Primary | ICD-10-CM

## 2024-06-05 DIAGNOSIS — G43.109 MIGRAINE WITH AURA AND WITHOUT STATUS MIGRAINOSUS, NOT INTRACTABLE: ICD-10-CM

## 2024-06-05 DIAGNOSIS — G47.10 HYPERSOMNIA: ICD-10-CM

## 2024-06-05 DIAGNOSIS — E66.9 OBESITY (BMI 30-39.9): ICD-10-CM

## 2024-06-05 PROCEDURE — 3074F SYST BP LT 130 MM HG: CPT | Performed by: OTHER

## 2024-06-05 PROCEDURE — 3008F BODY MASS INDEX DOCD: CPT | Performed by: OTHER

## 2024-06-05 PROCEDURE — 99215 OFFICE O/P EST HI 40 MIN: CPT | Performed by: OTHER

## 2024-06-05 PROCEDURE — 3078F DIAST BP <80 MM HG: CPT | Performed by: OTHER

## 2024-06-05 NOTE — PROGRESS NOTES
[facility]  SLEEP PROGRESS NOTE        HPI:   This is a 48 year old female coming in for   Chief Complaint   Patient presents with    Obstructive Sleep Apnea (BENITA)     Dme: [employer]  Nasal mask / want to try a different mask (per pt high leakage with nasal mask)       HPI:     This is a 48 year old female who presents with the following symptoms, risk factors, behaviors or other items associated with sleep problems.    Sleep Apnea:   coughing; wakes with dry mouth; nasal congestion  Insomnia:  difficulty staying asleep  Restless Leg:  no symptoms or restless legs  Parasomnias:   no symptoms of parasomnias  Daytime Problems:  no symptoms of daytime problems    The patient's [place] Sleepiness score is .  24--dx completed    Respiratory Analysis: The Apnea-Hypopnea Index (AHI) was 5.2 events per hour. REM related AHI was 12.2 events per hour. Supine related AHI was 7.5. Lateral related AHI was 4.2. The Central Apnea Index was 0. The oxygen saturation jose r was 83.5% and patient spent 0.1% with saturations less than 88%.      31-90 DAY FOLLOWUP  OLIVARESHALEY MANCIA ANDREWS  2024 - 2024  Patient ID: [id number]  : 1975  Age: 48 years  Gender: Female  WILLOWBRIL ([id number])  [place]  Compliance Report  Usage 2024 - 2024  Usage days 69/70 days (99%)  >= 4 hours 65 days (93%)  < 4 hours 4 days (6%)  Usage hours 463 hours 34 minutes  Average usage (total days) 6 hours 37 minutes  Average usage (days used) 6 hours 43 minutes  Median usage (days used) 7 hours 6 minutes  Total used hours (value since last reset - 2024) 462 hours  AirSense 10 AutoSet  Serial number [id number]  Mode AutoSet  Min Pressure 7.2 cmH2O  Max Pressure 10 cmH2O  EPR Off  Response Standard  Therapy  Pressure - cmH2O Median: 7.9 95th percentile: 9.4 Maximum: 9.8  Leaks - L/min Median: 1.5 95th percentile: 20.3 Maximum: 31.5  Events per hour AI: 0.1 HI: 0.1 AHI: 0.2  Apnea Index Central: 0.0 Obstructive: 0.1 Unknown:  0.0  RERA Index 0.1  Cheyne-Bradley respiration (average duration per night) 0 minutes (0%)    DME: [employer]  NASAL MASK    Having leak issues and wants to try a new mask  Has nasal pillow    She is benefiting  No headaches since using cpap  No EDS much improved  Alert and awake   ESS , now at     Patient: Sleep review of systems today: see form.      Pt  PCP:  Fozia Thibodeaux MD  No referring provider defined for this encounter.           No data to display                    Past Medical History:    Anxiety    Decorative tattoo    Depression    Disorder of thyroid    History of gestational diabetes in prior pregnancy, currently pregnant ([facility])    HPV in female    Migraine    Migraines    Prediabetes    Sterilization    Essure    Wears glasses     Past Surgical History:   Procedure Laterality Date    Cholecystectomy  2004    Hysterectomy      partial hyst    Hysteroscopy, sterilization  2011    Essure          x3    Tonsillectomy  2010     Social History:  Social History     Social History Narrative    ** Merged History Encounter **          Social History     Socioeconomic History    Marital status:    Tobacco Use    Smoking status: Never    Smokeless tobacco: Never   Vaping Use    Vaping status: Never Used   Substance and Sexual Activity    Alcohol use: No    Drug use: No    Sexual activity: Yes     Partners: Male     Comment: Essure   Social History Narrative    ** Merged History Encounter **          Family History:  Family History   Problem Relation Age of Onset    Diabetes Mother     Hypertension Mother     Lipids Mother     Other (Other) Mother         thyroid disease    Diabetes Maternal Grandmother     Hypertension Maternal Grandmother     Heart Attack Maternal Grandfather     Diabetes Maternal Grandfather     Hypertension Maternal Grandfather     Heart Disorder Maternal Grandfather         heart attack    Diabetes Paternal Grandmother     Diabetes Paternal Grandfather      Cancer Maternal Aunt 57        lung cancer     Allergies:  Allergies   Allergen Reactions    Shrimp TONGUE SWELLING     Current Meds:  Current Outpatient Medications   Medication Sig Dispense Refill    albuterol 108 (90 Base) MCG/ACT Inhalation Aero Soln Inhale 2 puffs into the lungs every 4 (four) hours as needed for Wheezing (cough). 1 each 0    benzonatate 200 MG Oral Cap Take 1 capsule (200 mg total) by mouth 3 (three) times daily as needed for cough. 45 capsule 0    buPROPion  MG Oral Tablet 24 Hr Take 1 tablet (300 mg total) by mouth daily. 90 tablet 3    metFORMIN  MG Oral Tablet 24 Hr Take 2 tablets (1,000 mg total) by mouth daily with breakfast. 180 tablet 1    levothyroxine 75 MCG Oral Tab Take 1 tablet (75 mcg total) by mouth before breakfast. 90 tablet 1    DICLOFENAC 75 MG Oral Tab EC TAKE 1 TABLET(75 MG) BY MOUTH DAILY AS NEEDED FOR PAIN 30 tablet 1    ondansetron (ZOFRAN) 4 mg tablet Take 1 tablet (4 mg total) by mouth every 8 (eight) hours as needed for Nausea. 30 tablet 1    ALPRAZolam (XANAX) 0.25 MG Oral Tab Take 1 tablet (0.25 mg total) by mouth nightly as needed. 10 tablet 0    SUMAtriptan 25 MG Oral Tab TAKE 1 TABLET BY MOUTH AS NEEDED FOR MIGRAINE. MAY REPEAT IN 2 HOURS IF NEEDED(MAX 2 TABLETS PER DAY) AS DIRECTED 9 tablet 1    Ciclopirox 8 % External Solution Apply to affected nail nightly 6 mL 1    naproxen 500 MG Oral Tab Take 1 tablet (500 mg total) by mouth 2 (two) times daily as needed. 60 tablet 1      Counseling given: Not Answered         Problem List:  Patient Active Problem List   Diagnosis    Migraine with aura    Anxiety and depression    Attention deficit hyperactivity disorder (ADHD), predominantly inattentive type    Blood glucose elevated    Abnormal TSH    Snoring    Atypical pigmented skin lesion    Special screening for malignant neoplasm of colon    Obstructive sleep apnea    Hypersomnia    Intraductal papilloma    Obesity (BMI 30-39.9)    Glucose intolerance     Other specified hypothyroidism    Abnormal mammogram    Abnormal mammogram of left breast       REVIEW OF SYSTEMS:   Review of Systems    EXAM:   /78 (BP Location: Right arm, Patient Position: Sitting, Cuff Size: adult)   Pulse 83   Resp 16   Ht 5' 6\" (1.676 m)   Wt 190 lb (86.2 kg)   LMP 05/28/2012 (LMP Unknown)   SpO2 98%   BMI 30.67 kg/m²  Estimated body mass index is 30.67 kg/m² as calculated from the following:    Height as of this encounter: 5' 6\" (1.676 m).    Weight as of this encounter: 190 lb (86.2 kg).   Neck in inches:      Wt Readings from Last 6 Encounters:   06/05/24 190 lb (86.2 kg)   05/12/24 190 lb (86.2 kg)   04/26/24 193 lb (87.5 kg)   03/19/24 192 lb (87.1 kg)   03/14/24 188 lb 3.2 oz (85.4 kg)   03/07/24 189 lb (85.7 kg)     BP Readings from Last 3 Encounters:   06/05/24 124/78   05/12/24 132/80   04/26/24 110/72     Pulse Readings from Last 3 Encounters:   06/05/24 83   05/12/24 82   04/26/24 95     SpO2 Readings from Last 3 Encounters:   06/05/24 98%   05/12/24 98%   04/26/24 100%      Ideal body weight: 59.3 kg (130 lb 11.7 oz)  Adjusted ideal body weight: 70.1 kg (154 lb 7 oz)    Vital signs reviewed.  Physical Exam    ASSESSMENT AND PLAN:   1. Obstructive sleep apnea  Doing great  Needs mask refit today  Patient is using and benefiting from regular cpap use.  Patient was instructed to clean equipment on a weekly basis.  Patient was instructed to keep up to date with supplies.  Patient was informed to avoid drowsy driving, or using heavy machinery.  Patient was instructed to followup on a annual basis.   2. Hypersomnia  Much improved   3. Obesity (BMI 30-39.9)  Not gaining weight  4. Migraine with aura and without status migrainosus, not intractable  No longer having headaches  There are no Patient Instructions on file for this visit.    Independent interpretation of Sleep Download as defined above.  Continue with Rx management of Sleep apnea with PAP therapy.    COMPLIANCE  is required by insurance for 4 hours a night most nights of the week.    Advised if still with sleep apnea and not using CPAP has a 7 fold increase in risk of heart attack, stroke, abnormal heart rhythm  and death,  increased risk of driving accidents.     Advised to refrain from driving when sleepy.      Recommend weight loss, and maintain and optimal BMI with Exercise 30 minutes most days to target heart rate .     Advised patient to change filters,masks,hoses  and tubes and equiptment on a  regular schedule.    Filters and seals shall be changed every 1 month,  Hoses every 3 months,   CPAP mask and humidifier chamber changed every 6 month  with the durable medical equipment provider.         Meds & Refills for this Visit:  Requested Prescriptions      No prescriptions requested or ordered in this encounter       Outcome: Parent verbalizes understanding. Parent is notified to call with any questions, complications, allergies, or worsening or changing symptoms.  Parent is to call with any side effects or complications from the treatments as a result of today.     \" This note was created utilizing Dragon speech recognition software.  Please excuse any grammatical errors. Call my office if you have any questions regarding this note. \"     Barry Nix,   6/5/2024  11:52 AM

## 2024-06-06 ENCOUNTER — TELEPHONE (OUTPATIENT)
Facility: CLINIC | Age: 49
End: 2024-06-06

## 2024-06-06 NOTE — TELEPHONE ENCOUNTER
06/06/2024, 10:24 AM    by ROSY FOX    Settings change detected on device    Set Mode to AutoSet  Set Min Pressure to 7.0 cmH2O  Set Max Pressure to 13.0 cmH2O  Set Response to Standard  Set EPR to Off  Set EPR level to 1  Set Ramp enable to Auto  Set Start pressure to 4.0 cmH2O

## 2024-06-12 ENCOUNTER — HOSPITAL ENCOUNTER (OUTPATIENT)
Age: 49
Discharge: HOME | End: 2024-06-12
Payer: COMMERCIAL

## 2024-06-12 ENCOUNTER — APPOINTMENT (OUTPATIENT)
Dept: GENERAL RADIOLOGY | Age: 49
End: 2024-06-12
Attending: NURSE PRACTITIONER
Payer: COMMERCIAL

## 2024-06-12 ENCOUNTER — TELEPHONE (OUTPATIENT)
Dept: ORTHOPEDICS CLINIC | Facility: CLINIC | Age: 49
End: 2024-06-12

## 2024-06-12 VITALS
TEMPERATURE: 97 F | HEART RATE: 65 BPM | HEIGHT: 66 IN | RESPIRATION RATE: 16 BRPM | DIASTOLIC BLOOD PRESSURE: 72 MMHG | BODY MASS INDEX: 30.53 KG/M2 | WEIGHT: 190 LBS | OXYGEN SATURATION: 96 % | SYSTOLIC BLOOD PRESSURE: 118 MMHG

## 2024-06-12 DIAGNOSIS — S62.514A CLOSED NONDISPLACED FRACTURE OF PROXIMAL PHALANX OF RIGHT THUMB, INITIAL ENCOUNTER: Primary | ICD-10-CM

## 2024-06-12 PROCEDURE — 99213 OFFICE O/P EST LOW 20 MIN: CPT | Performed by: NURSE PRACTITIONER

## 2024-06-12 PROCEDURE — L3924 HFO WITHOUT JOINTS PRE OTS: HCPCS | Performed by: NURSE PRACTITIONER

## 2024-06-12 PROCEDURE — 73130 X-RAY EXAM OF HAND: CPT | Performed by: NURSE PRACTITIONER

## 2024-06-12 NOTE — ED PROVIDER NOTES
Patient Seen in: [facility]      History     Chief Complaint   Patient presents with    Arm or Hand Injury     Stated Complaint: Fall- thumb injury    Subjective:   48-year-old female presents today with history of trip and fall that occurred last night.  States another dog was playing with her dog which caused her to fall landing forward.  States braced herself with her hand now has pain to the thumb and second finger of the hand.  No gross deformities or swelling.  Denies any LOC no nausea vomiting.  Alert oriented x 3.  No other symptoms or concerns.  The patient's medication list, past medical history and social history elements as listed in today's nurse's notes were reviewed and agreed (except as otherwise stated in the HPI).  The patient's family history reviewed and determined to be noncontributory to the presenting problem            Objective:   Past Medical History:    Anxiety    Decorative tattoo    Depression    Disorder of thyroid    History of gestational diabetes in prior pregnancy, currently pregnant ([facility])    HPV in female    Migraine    Migraines    Prediabetes    Sterilization    Essure    Wears glasses              Past Surgical History:   Procedure Laterality Date    Cholecystectomy  2004    Hysterectomy      partial hyst    Hysteroscopy, sterilization  2011    Essure          x3    Tonsillectomy  2010                Social History     Socioeconomic History    Marital status:    Tobacco Use    Smoking status: Never    Smokeless tobacco: Never   Vaping Use    Vaping status: Never Used   Substance and Sexual Activity    Alcohol use: No    Drug use: No    Sexual activity: Yes     Partners: Male     Comment: Essure   Social History Narrative    ** Merged History Encounter **                   Review of Systems    Positive for stated complaint: Fall- thumb injury  Other systems are as noted in HPI.  Constitutional and vital signs reviewed.      All other  systems reviewed and negative except as noted above.    Physical Exam     ED Triage Vitals [06/12/24 1010]   /72   Pulse 65   Resp 16   Temp 97.4 °F (36.3 °C)   Temp src Temporal   SpO2 96 %   O2 Device None (Room air)       Current Vitals:   Vital Signs  BP: 118/72  Pulse: 65  Resp: 16  Temp: 97.4 °F (36.3 °C)  Temp src: Temporal    Oxygen Therapy  SpO2: 96 %  O2 Device: None (Room air)            Physical Exam  Vitals and nursing note reviewed.   Constitutional:       Appearance: Normal appearance.   HENT:      Head: Normocephalic.      Mouth/Throat:      Mouth: Mucous membranes are moist.   Cardiovascular:      Rate and Rhythm: Normal rate.   Pulmonary:      Effort: Pulmonary effort is normal.   Musculoskeletal:      Comments: Pain with palpation to the base of the right thumb and second finger.  No gross deformities mild swelling is noted.  Capillary fill is 3 seconds.  Nails are intact.   Skin:     General: Skin is warm and dry.   Neurological:      Mental Status: She is alert and oriented to person, place, and time.               ED Course   Labs Reviewed - No data to display          XR HAND (MIN 3 VIEWS), RIGHT (CPT=73130)    Result Date: 6/12/2024  PROCEDURE:  XR HAND (MIN 3 VIEWS), RIGHT (CPT=73130)  TECHNIQUE:  Three views of the right hand were obtained.  COMPARISON:  None.  INDICATIONS:  Fall- thumb injury  PATIENT STATED HISTORY: (As transcribed by Technologist)  Patient states she was playing last night with her dogs in the yard. A friend's dog was there too and tripped her. She broke her fall with her right hand and has pain to right thumb and around 2nd  MCP joint.           CONCLUSION:    Equivocal for possible very subtle fracture involving the articular surface of the base of the proximal phalanx of the thumb on two views with subtle cortical irregularity in this region.  Advise correlation with location tenderness.  Mild  adjacent tissue swelling.  No other potential fracture.  No  dislocation or other deformity. LOCATION:  [facility]   Dictated by (CST): Mike Vazquez MD on 6/12/2024 at 10:42 AM     Finalized by (JOSE): Mike Vazquez MD on 6/12/2024 at 10:43 AM              MDM     Please note that this report has been produced using speech recognition software and may contain errors related to that system including, but not limited to, errors in grammar, punctuation, and spelling, as well as words and phrases that possibly may have been recognized inappropriately.  If there are any questions or concerns, contact the dictating provider for clarification.        Note to patient: The 21st Century Cures Act makes medical notes like these available to patients in the interest of transparency. However, this is a medical document intended as peer to peer communication. It is written in medical language and may contain abbreviations or verbiage that are unfamiliar. It may appear blunt or direct. Medical documents are intended to carry relevant information, facts as evident, and the clinical opinion of the practitioner.                                   Medical Decision Making  Differential diagnosis includes but is not limited to: Thumb/hand-contusion, sprain, fracture        Presents today with history of fall where she injured her right hand specifically the thumb and second finger.  X-ray does show subtle possible fracture to the base of the first phalanx of the thumb.  Thumb spica was placed.  RICE instructions given.  To take over-the-counter NSAID and Tylenol for pain and swelling.  To follow-up with orthopedics.  Patient verbalized understanding and agreed to plan of care.    Amount and/or Complexity of Data Reviewed  Radiology: ordered. Decision-making details documented in ED Course.     Details: X-ray right hand    Risk  OTC drugs.        Disposition and Plan     Clinical Impression:  1. Closed nondisplaced fracture of proximal phalanx of right thumb, initial encounter          Disposition:  Discharge  6/12/2024 10:57 am    Follow-up:  Ramakrishna Thomason MD  [address] [address]  [phone]    Schedule an appointment as soon as possible for a visit             Medications Prescribed:  Current Discharge Medication List

## 2024-06-12 NOTE — TELEPHONE ENCOUNTER
Please see imaging in epic. Please advise if/when patient can be seen and if you want new imaging in or out of splint. Thanks !

## 2024-06-12 NOTE — ED INITIAL ASSESSMENT (HPI)
Patient was playing last night with her dogs in the yard. A friend's dog was there too and tripped her. She broke her fall with her right hand and has thumb pain into her hand and a bit of pain to her 2nd metacarpal as well. She has a small abrasion  to her forehead from the grass, but no dizziness or HA.

## 2024-06-12 NOTE — TELEPHONE ENCOUNTER
Patient has an closed nondisplaced fracture of proximal phalanx of right thumb and was told to f/up with Orthopedics this week.  Please advise.

## 2024-06-14 ENCOUNTER — OFFICE VISIT (OUTPATIENT)
Facility: CLINIC | Age: 49
End: 2024-06-14
Payer: COMMERCIAL

## 2024-06-14 VITALS — HEIGHT: 66 IN | WEIGHT: 190 LBS | BODY MASS INDEX: 30.53 KG/M2

## 2024-06-14 DIAGNOSIS — M24.9: ICD-10-CM

## 2024-06-14 DIAGNOSIS — S62.501A FRACTURE OF UNSPECIFIED PHALANX OF RIGHT THUMB, INITIAL ENCOUNTER FOR CLOSED FRACTURE: Primary | ICD-10-CM

## 2024-06-14 PROCEDURE — 99204 OFFICE O/P NEW MOD 45 MIN: CPT | Performed by: FAMILY MEDICINE

## 2024-06-14 PROCEDURE — 3008F BODY MASS INDEX DOCD: CPT | Performed by: FAMILY MEDICINE

## 2024-06-14 RX ORDER — DICLOFENAC SODIUM 75 MG/1
75 TABLET, DELAYED RELEASE ORAL 2 TIMES DAILY
Qty: 28 TABLET | Refills: 1 | Status: SHIPPED | OUTPATIENT
Start: 2024-06-14

## 2024-06-14 NOTE — H&P
Sports Medicine Clinic Note     Subjective:    Chief Complaint: Right thumb pain.    Date of Injury: 6/11/24    History of Present Illness: This is a pleasant 48 year old RHD female who presents with pain and swelling in the right thumb, which started after a fall while playing with her dogs at home. She is unsure exactly how she landed but states she may have hyperextended the thumb when landing. They report a sensation of instability in the thumb when grasping objects or performing movements that involve pinching. The patient denies any prior history of similar injuries or chronic issues with the thumb. They have not tried any specific treatments at home besides applying ice and taking over-the-counter pain medication, which has provided minimal relief.    Objective:    Right Thumb Examination:    Inspection: Visible swelling and slight bruising around the base of the thumb. No open wounds or deformities noted.    Palpation: Tenderness localized to the medial aspect of the thumb, at the metacarpophalangeal joint. No palpable crepitus.    Range of Motion: Passive and active range of motion of the thumb MCP joint is limited due to pain. Extension and abduction are particularly painful.    Neurovascular Examination: Sensation intact in all dermatomes of the hand. Radial and ulnar pulses are present and symmetric.    Special Orthopaedic Tests: The valgus stress test of the thumb MCP joint elicits significant pain and suggests instability although this is limited by guarding and pain.    Diagnostic Studies:     Radiographs of the affected thumb personally reviewed in the office. Possibly articular fracture of the base of the thumb proximal phalanx. No displacement just subtle cortical irregularity in this region. No dislocation is seen. Bones otherwise show normal alignment and architecture. Soft tissue are normal.    Assessment:    1. Fracture of unspecified phalanx of right thumb, initial encounter for closed  fracture  2. Joint derangement of hand, rule out thumb UCL injury    Plan:    Further Workup: Thumb UCL injury is suspected based on clinical examination and patient's history. The severity of the injury (partial vs. complete tear) is to be confirmed by MRI. Possible fracture on initial X-ray, further imaging will provide comprehensive detail.    Conservative Treatment: Immobilize the thumb using a thumb spica to promote healing. Advise the patient to avoid activities that could exacerbate the injury.    Medication: Prescribe NSAIDs for pain management and to reduce inflammation. Consider recommending ice application for 20 minutes every 3-4 hours to manage swelling.    Occupational Therapy: Refer to occupational therapy for thumb strengthening and range of motion exercises once the splint is removed, to ensure proper rehabilitation of the thumb UCL injury.    Patient Education: Educate the patient on the importance of adhering to the immobilization and rest period to ensure proper healing. Provide guidance on the use of ice and NSAIDs to manage symptoms at home.    Follow-up and Referral: Schedule a follow-up in coming weeks to reassess and review imaging. Depending on MRI results, may refer to an orthopedic specialist for possible surgical intervention if a complete tear is confirmed or if instability persists despite conservative treatment.        Nba Driscoll DO, CAQSM   Primary Care Sports Medicine    Department of Orthopaedic Surgery  [facility]    [address] [place] [address]  [address] [place] [address]    t: [phone]  f: [phone]      [facility]

## 2024-06-27 ENCOUNTER — TELEMEDICINE (OUTPATIENT)
Dept: ORTHOPEDICS CLINIC | Facility: CLINIC | Age: 49
End: 2024-06-27

## 2024-06-27 ENCOUNTER — TELEPHONE (OUTPATIENT)
Dept: ORTHOPEDICS CLINIC | Facility: CLINIC | Age: 49
End: 2024-06-27

## 2024-06-27 DIAGNOSIS — S63.641D SPRAIN OF METACARPOPHALANGEAL (MCP) JOINT OF RIGHT THUMB, SUBSEQUENT ENCOUNTER: Primary | ICD-10-CM

## 2024-06-27 DIAGNOSIS — S63.621D SPRAIN OF INTERPHALANGEAL JOINT OF RIGHT THUMB, SUBSEQUENT ENCOUNTER: ICD-10-CM

## 2024-06-27 PROCEDURE — 99214 OFFICE O/P EST MOD 30 MIN: CPT | Performed by: FAMILY MEDICINE

## 2024-06-27 NOTE — TELEPHONE ENCOUNTER
Patient called to check if MRI results were sent to provider from [facility]. MRI report and images in epic and ready to be reviewed. Patient would like to know how soon she can schedule bc she was told she will need to be seen asap. Please advise, thank you.    Future Appointments   Date Time Provider Department Center   7/17/2024  8:00 AM Fozia Thibodeaux MD [facility]   6/10/2025 10:15 AM Barry Nix,  [facility]

## 2024-06-27 NOTE — TELEPHONE ENCOUNTER
Patient came to [facility] demanding an appt today with Dr. Driscoll. Spoke with Dmitri, Dr. Driscoll will see patient via video visit today at 4pm.

## 2024-06-27 NOTE — PROGRESS NOTES
Sports Medicine Clinic Note    Subjective:    Chief Complaint: Persistent right thumb pain MRI review.    Date of Injury: 6/11/24    History of Present Illness: This is a 48-year-old right-hand-dominant female who returns for virtual follow-up visit regarding her right thumb injury sustained on 6/11/24. She reports continued pain although this is slightly improved. The patient has adhered to using the thumb spica splint and has been taking NSAIDs as previously prescribed, which have provided some relief but not complete resolution of symptoms. She also reports stomach upset with oral NSAIDs and would like to discuss an alternative. She denies any new injuries or changes in symptoms since the last visit.    Objective:    Diagnostic Tests:    MRI of the right hand (without contrast) reveals:  Subcutaneous soft tissue edema at the base of the thumb.  No fractures or focal bone lesions.  Joint spaces are preserved with no dislocations.  Mild increased signal and probable mild partial tearing of the thumb MCP joint ulnar collateral ligament at its proximal phalanx base attachment. No ligamentous retraction or Stener lesion.  Mild thickening and minimal increased signal in the thumb interphalangeal (IP) joint ulnar collateral ligament without discrete tear.  Intact thumb radial collateral ligaments.  Intact flexor and extensor tendons.  Normal signal characteristics of the musculature.    Assessment:    Mild partial tear of the thumb MCP joint ulnar collateral ligament.  Mild subacute/chronic sprain of the thumb IP joint ulnar collateral ligament.  Persistent right thumb pain.    Plan:    Additional Imaging/Workup:   None at this time.    Therapy:  Continue with occupational therapy focusing on thumb strengthening and range of motion exercises once the pain decreases and splint is removed.  Begin gentle range of motion exercises and gradual strengthening under the guidance of a therapist.    Medications:  Continue NSAIDs  for pain management and inflammation reduction, can try a topical form.    Bracing/Casting:  Continue using the thumb spica splint for immobilization. Reevaluate the need for the splint at the next follow-up visit based on symptom improvement.    Procedures:  No surgical intervention is planned at this time given the mild nature of the ligamentous injuries. Consider injection for pain management if symptoms persist or worsen.    Activity Recommendations:  Advise the patient to avoid activities that exacerbate the pain or require significant thumb grasping and pinching.  Gradually reintroduce activities as tolerated and guided by symptom improvement and therapy progress.    Follow-Up:  Tentative follow-up is scheduled in 2-4 weeks to reassess the thumb's stability, pain level, and overall function.  Instruct the patient to return earlier if symptoms worsen or new symptoms develop.    Telehealth Visit Disclaimer:    This clinician is part of the telehealth program and is conducting this visit in a currently approved location. For this visit the clinician and patient were present via interactive video telecommunications system that permits two-way, real-time/synchronous communications. This has been done in good karthik to provide continuity of care in the best interest of the provider-patient relationship, due to the ongoing public health crisis/national emergency and because of restrictions of visitation. There are limitations of this visit as no hands-on physical examination could be performed.    Patient consent given for telehealth visit: Yes  Patient and clinician are currently located in the state [facility].  The clinician is appropriately licensed in the above state to provide care for this visit.  Other individuals present during the telehealth encounter and their role/relation: N/a  Total time spent in medical video consultation (required if billing based on time): 30 minutes  Total time spent providing  education, coordination of care/services, and counselin minutes    This billing was spent on history taking; observational physical exam; review of labs, medications, and radiology tests; and decision making.  Appropriate medical decision-making and tests are ordered as detailed in the plan of care above.        Nba Driscoll DO, CAM   Primary Care Sports Medicine    Department of Orthopaedic Surgery  [facility]    [address] [place] [address]  [address] [place] [address]    t: [phone]  f: [phone]      [facility]

## 2024-06-27 NOTE — TELEPHONE ENCOUNTER
Patient stopped by [place] office asking to be seen right away for her right hand.    She had an MRI yesterday 6/26 - see Epic chart.    She states she can't wait until two weeks from now to be seen as she is in a lot of pain.    Advised her that there are no providers scheduled here at  today.    Offered to send a message to the office but she stated she will drive to the [place] location.

## 2024-07-09 ENCOUNTER — LAB ENCOUNTER (OUTPATIENT)
Dept: LAB | Age: 49
End: 2024-07-09
Attending: INTERNAL MEDICINE
Payer: COMMERCIAL

## 2024-07-09 DIAGNOSIS — Z13.228 SCREENING FOR METABOLIC DISORDER: ICD-10-CM

## 2024-07-09 DIAGNOSIS — Z00.00 ROUTINE GENERAL MEDICAL EXAMINATION AT A HEALTH CARE FACILITY: ICD-10-CM

## 2024-07-09 DIAGNOSIS — E03.8 OTHER SPECIFIED HYPOTHYROIDISM: ICD-10-CM

## 2024-07-09 DIAGNOSIS — R73.9 BLOOD GLUCOSE ELEVATED: ICD-10-CM

## 2024-07-09 DIAGNOSIS — Z13.220 SCREENING FOR LIPID DISORDERS: ICD-10-CM

## 2024-07-09 LAB
EST. AVERAGE GLUCOSE BLD GHB EST-MCNC: 131 MG/DL (ref 68–126)
HBA1C MFR BLD: 6.2 % (ref ?–5.7)
T4 FREE SERPL-MCNC: 1 NG/DL (ref 0.8–1.7)
TSI SER-ACNC: 2.32 MIU/ML (ref 0.36–3.74)

## 2024-07-09 PROCEDURE — 80053 COMPREHEN METABOLIC PANEL: CPT | Performed by: INTERNAL MEDICINE

## 2024-07-09 PROCEDURE — 84439 ASSAY OF FREE THYROXINE: CPT | Performed by: INTERNAL MEDICINE

## 2024-07-09 PROCEDURE — 84443 ASSAY THYROID STIM HORMONE: CPT | Performed by: INTERNAL MEDICINE

## 2024-07-09 PROCEDURE — 83036 HEMOGLOBIN GLYCOSYLATED A1C: CPT | Performed by: INTERNAL MEDICINE

## 2024-07-09 PROCEDURE — 80061 LIPID PANEL: CPT | Performed by: INTERNAL MEDICINE

## 2024-07-11 DIAGNOSIS — Z13.228 SCREENING FOR METABOLIC DISORDER: ICD-10-CM

## 2024-07-11 DIAGNOSIS — Z13.0 SCREENING FOR BLOOD DISEASE: ICD-10-CM

## 2024-07-11 DIAGNOSIS — Z13.220 SCREENING FOR LIPID DISORDERS: ICD-10-CM

## 2024-07-11 DIAGNOSIS — Z00.00 ROUTINE GENERAL MEDICAL EXAMINATION AT A HEALTH CARE FACILITY: Primary | ICD-10-CM

## 2024-07-11 LAB
ALBUMIN SERPL-MCNC: 3.8 G/DL (ref 3.4–5)
ALBUMIN/GLOB SERPL: 1 {RATIO} (ref 1–2)
ALP LIVER SERPL-CCNC: 69 U/L
ALT SERPL-CCNC: 36 U/L
ANION GAP SERPL CALC-SCNC: 8 MMOL/L (ref 0–18)
AST SERPL-CCNC: 20 U/L (ref 15–37)
BILIRUB SERPL-MCNC: 0.4 MG/DL (ref 0.1–2)
BUN BLD-MCNC: 10 MG/DL (ref 9–23)
CALCIUM BLD-MCNC: 9.6 MG/DL (ref 8.5–10.1)
CHLORIDE SERPL-SCNC: 106 MMOL/L (ref 98–112)
CHOLEST SERPL-MCNC: 220 MG/DL (ref ?–200)
CO2 SERPL-SCNC: 25 MMOL/L (ref 21–32)
CREAT BLD-MCNC: 0.63 MG/DL
EGFRCR SERPLBLD CKD-EPI 2021: 111 ML/MIN/1.73M2 (ref 60–?)
GLOBULIN PLAS-MCNC: 3.9 G/DL (ref 2.8–4.4)
GLUCOSE BLD-MCNC: 105 MG/DL (ref 70–99)
HDLC SERPL-MCNC: 48 MG/DL (ref 40–59)
LDLC SERPL CALC-MCNC: 101 MG/DL (ref ?–100)
NONHDLC SERPL-MCNC: 172 MG/DL (ref ?–130)
OSMOLALITY SERPL CALC.SUM OF ELEC: 287 MOSM/KG (ref 275–295)
POTASSIUM SERPL-SCNC: 5 MMOL/L (ref 3.5–5.1)
PROT SERPL-MCNC: 7.7 G/DL (ref 6.4–8.2)
SODIUM SERPL-SCNC: 139 MMOL/L (ref 136–145)
TRIGL SERPL-MCNC: 421 MG/DL (ref 30–149)
VLDLC SERPL CALC-MCNC: 72 MG/DL (ref 0–30)

## 2024-07-15 ENCOUNTER — OFFICE VISIT (OUTPATIENT)
Facility: CLINIC | Age: 49
End: 2024-07-15
Payer: COMMERCIAL

## 2024-07-15 ENCOUNTER — TELEPHONE (OUTPATIENT)
Dept: PHYSICAL THERAPY | Facility: HOSPITAL | Age: 49
End: 2024-07-15

## 2024-07-15 VITALS — BODY MASS INDEX: 30.53 KG/M2 | HEIGHT: 66 IN | WEIGHT: 190 LBS

## 2024-07-15 DIAGNOSIS — S63.621D SPRAIN OF INTERPHALANGEAL JOINT OF RIGHT THUMB, SUBSEQUENT ENCOUNTER: ICD-10-CM

## 2024-07-15 DIAGNOSIS — S63.641D SPRAIN OF METACARPOPHALANGEAL (MCP) JOINT OF RIGHT THUMB, SUBSEQUENT ENCOUNTER: Primary | ICD-10-CM

## 2024-07-15 PROCEDURE — 99214 OFFICE O/P EST MOD 30 MIN: CPT | Performed by: FAMILY MEDICINE

## 2024-07-15 PROCEDURE — 3008F BODY MASS INDEX DOCD: CPT | Performed by: FAMILY MEDICINE

## 2024-07-15 NOTE — PROGRESS NOTES
Sports Medicine Clinic Note    Subjective:    Chief Complaint: Improved right thumb pain.    Date of Injury: 6/11/24    Interval History: This is a 48-year-old right-hand-dominant female who presents for follow-up regarding her right thumb injury sustained on 6/11/24. The patient reports significant improvement in pain since the last visit, with pain now being intermittent and less severe. She has continued using the thumb spica splint as advised and has been adhering to the NSAID regimen. The patient denies any new injuries, changes in symptoms, or worsening of her condition since the previous visit.    Objective:    Right Thumb Examination:    Inspection:  No visible swelling or erythema  No deformity noted    Palpation:  No tenderness over the ulnar collateral ligament of the MCP joint  No tenderness over the radial collateral ligament  No tenderness at the thumb IP joint    Range of Motion:  Slightly limited flexion and extension of the thumb MCP joint with mild discomfort at the end range  Limited flexion and extension of the thumb IP joint without pain    Neurovascular:  Intact sensation in all distributions  Capillary refill < 2 seconds in the thumb  Strong radial pulse    Special Tests:  Negative valgus stress test at the thumb MCP joint    Diagnostic Tests:    No new testing.    MRI of the right hand (previously reviewed):  Subcutaneous soft tissue edema at the base of the thumb.  No fractures or focal bone lesions.  Joint spaces are preserved with no dislocations.  Mild increased signal and probable mild partial tearing of the thumb MCP joint ulnar collateral ligament at its proximal phalanx base attachment. No ligamentous retraction or Stener lesion.  Mild thickening and minimal increased signal in the thumb interphalangeal (IP) joint ulnar collateral ligament without discrete tear.  Intact thumb radial collateral ligaments.  Intact flexor and extensor tendons.  Normal signal characteristics of the  musculature.    Assessment:    Mild partial tear of the thumb MCP joint ulnar collateral ligament, improving.  Mild subacute/chronic sprain of the thumb IP joint ulnar collateral ligament, stable.  Improved right thumb pain.    Plan:    Additional Imaging/Workup:  None required at this time.    Therapy:  Continue with occupational therapy focusing on thumb strengthening and range of motion exercises. Increase intensity gradually as tolerated.    Medications:  Continue using topical NSAIDs for pain management.    Bracing/Casting:  Continue thumb spica splint during activities that could potentially strain the thumb. Consider reducing the use of the splint during low-risk activities as pain allows.    Procedures:  No surgical intervention is planned at this time.    Activity Recommendations:  Advise the patient to avoid activities that require significant thumb grasping and pinching until full strength and function are regained.  Encourage gradual reintroduction of normal activities, guided by symptom improvement and therapy progress.    Follow-Up:  Tentative follow-up is scheduled in 4-6 weeks to reassess the thumb's stability, pain level, and overall function.  Instruct the patient to return earlier if symptoms worsen or new symptoms develop.      Nba Driscoll DO, CAQSM   Primary Care Sports Medicine    Department of Orthopaedic Surgery  [facility]    [address] [place] [address]  [address] [place] [address]    t: [phone]  f: [phone]      [facility]

## 2024-07-16 ENCOUNTER — OFFICE VISIT (OUTPATIENT)
Dept: OCCUPATIONAL MEDICINE | Facility: HOSPITAL | Age: 49
End: 2024-07-16
Attending: FAMILY MEDICINE
Payer: COMMERCIAL

## 2024-07-16 DIAGNOSIS — S63.641D SPRAIN OF METACARPOPHALANGEAL (MCP) JOINT OF RIGHT THUMB, SUBSEQUENT ENCOUNTER: Primary | ICD-10-CM

## 2024-07-16 DIAGNOSIS — S63.621D SPRAIN OF INTERPHALANGEAL JOINT OF RIGHT THUMB, SUBSEQUENT ENCOUNTER: ICD-10-CM

## 2024-07-16 PROCEDURE — 97110 THERAPEUTIC EXERCISES: CPT

## 2024-07-16 PROCEDURE — 97165 OT EVAL LOW COMPLEX 30 MIN: CPT

## 2024-07-16 NOTE — PROGRESS NOTES
OCCUPATIONAL THERAPY UPPER EXTREMITY EVALUATION     Diagnosis:   Sprain of metacarpophalangeal (MCP) joint of right thumb, subsequent encounter (K81.788H)  Sprain of interphalangeal joint of right thumb, subsequent encounter (S69.927E)      Referring Provider: Nba Driscoll  Date of Evaluation:    7/16/2024    Precautions:  None Next MD visit:   none scheduled  Date of Surgery: n/a     PATIENT SUMMARY   Lorelei Clemente is a 48 year old female who presents to therapy today with complaints of stiffness, decreased ROM, and deconditioning s/p right thumb injury sustained on 6/11/2024. Patient notes she was playing with her dogs when she injured her right thumb. Initial presentation with significant pain and possible UCL tear of thumb alongside swelling. Patient notes symptoms have improved significantly with only intermittent pain at this time. Patient has been wearing thumb spica splint for protection the past few weeks. OT services received physician's orders for evaluation and treatment as indicated to maximize rehab potential.  Pt describes pain level current 0/10, at best 0/10, at worst 5/10. Patient notes intermittent pain at this time to dorsal IP joint of R thumb with movement described as achy/sharp.   Current functional limitations include functional ADL/IADL management, gripping, lifting, pinching, housework, and overall functional use of RUE.    Lorelei describes prior level of function as functionally (I) w/ all ADL/IADLs.   Employment: Working full duty; [employer]  Hand Dominance: right  Living Situation: Family  Imaging/Tests:  6/26/2024: MRI HAND/FINGER RT WO CONTRAST   IMPRESSION:   1. Mild sprain and mild partial non-retracted tearing of the thumb MCP joint ulnar collateral ligament at its proximal phalanx base attachment.   2. Mild subacute/chronic sprain of the thumb IP joint ulnar collateral ligament.    Pt goals include improve functional use of RUE back to  PLOF.  Past medical history was reviewed with Lorelei. Significant findings include sprain of MCP joint of right thumb and sprain of IP joint of right thumb.    ASSESSMENT  Lorelei presents to occupational therapy evaluation with primary c/o stiffness, decreased ROM, and deconditioning s/p right thumb injury sustained on 6/11/2024. Patient notes she was playing with her dogs when she injured her right thumb. Initial presentation with significant pain and possible UCL tear of thumb alongside swelling. Patient notes symptoms have improved significantly with only intermittent pain at this time. Patient has been wearing thumb spica splint for protection the past few weeks. OT services received physician's orders for evaluation and treatment as indicated to maximize rehab potential. Pt describes pain level current 0/10, at best 0/10, at worst 5/10. Patient notes intermittent pain at this time to dorsal IP joint of R thumb with movement described as achy/sharp. The results of the objective tests and measures show decreased ROM, stiffness, and decreased function in RUE. Functional deficits include but are not limited to functional ADL/IADL management, gripping, lifting, pinching, housework, and overall functional use of RUE. Signs and symptoms are consistent with diagnosis of sprain of MCP joint of right thumb and sprain of IP joint of right thumb. Pt and OT discussed evaluation findings, pathology, POC and HEP.  Pt voiced understanding and performs HEP correctly without reported pain. Skilled Occupational Therapy is medically necessary to address the above impairments and reach functional goals.     OBJECTIVE    OBSERVATION: Patient noted with stiffness and decreased ROM for R thumb MP/IP joints. Patient pain level improving.    ORTHOTICS:   Thumb Spica Splint w/ IP Joint Free - to be worn as needed during activity/sleeping    SCAR: n/a    SENSORY: Patient denies any numbness/tingling.    CIRCUMFERENTIAL EDEMA (cm):  D1  (Thumb) P1: R=6.7cm; L=6.2cm     AROM (degrees): Patient noted with BUE AROM WFL for shoulders, elbows, wrists, and digits except noted below    RIGHT HAND:   RUE Thumb   MP 0-30   IP 0-25   NAILS 55     RUE Thumb   Palmar Abduction 0-55   Radial Abduction 0-50     LEFT HAND:   LUE Thumb   MP 0-45   IP 0-45   NAILS 90     LUE Thumb   Palmar Abduction 0-75   Radial Abduction 0-75     STRENGTH: Patient strength measurements deferred at this visit. To be assessed when next appropriate.    SPECIAL TESTS:   Nine-Hole Peg Test: NT    Today’s Treatment and Response:   Pt education was provided on exam findings, treatment diagnosis, treatment plan, expectations, and prognosis. Pt was also provided recommendations for use of heat/cold, splinting, and HEP exercises.  Patient was instructed in and issued a HEP for:   -Thumb ROM Exercises  -Green/Yellow Thumb Flexion Sponge    Charges: OT Eval: Low Complexity, TEx1      Total Timed Treatment: 45 min     Total Treatment Time: 45 min     Based on clinical rationale and outcome measures, this evaluation involved Low Complexity decision making due to 1-2 personal factors/comorbidities, 3 body structures involved/activity limitations, and evolving symptoms including changing pain levels.  PLAN OF CARE   Goals: (to be met in 8 visits)   1. Patient will demonstrate at least WFL AROM R thumb palmar abduction indicating increased ability to manage functional ADL/IADLs.  2. Patient will demonstrate full AROM NAILS for R thumb indicating increased ability to manage functional ADL/IADLs.  3. Patient will demonstrate WFL R thumb 3-Point Pinch strength indicating increased ability to manage functional ADL/IADLs  4. Patient will be independent and compliant with comprehensive HEP to maintain progress achieved in OT.    Frequency / Duration: Patient will be seen for 2x/week or a total of 8 visits over a 90 day period.  Treatment will include: Manual Therapy, Neuromuscular Re-education, Self-Care  Home Management, Therapeutic Activities, Therapeutic Exercise, and Home Exercise Program instruction, Splinting, Modalities PRN    Education or treatment limitation: None  Rehab Potential:good    QuickDASH Outcome Score  Score: 45.45 % (7/15/2024 12:43 PM)      Patient/Family/Caregiver was advised of these findings, precautions, and treatment options and has agreed to actively participate in planning and for this course of care.    Thank you for your referral. Please co-sign or sign and return this letter via fax as soon as possible to [phone]. If you have any questions, please contact me at Dept: [phone]    Sincerely,  Electronically signed by therapist: Zenon Bailey OT  Physician's certification required: Yes  I certify the need for these services furnished under this plan of treatment and while under my care.    X___________________________________________________ Date____________________    Certification From: 7/16/2024  To:10/14/2024

## 2024-07-17 ENCOUNTER — OFFICE VISIT (OUTPATIENT)
Dept: INTERNAL MEDICINE CLINIC | Facility: CLINIC | Age: 49
End: 2024-07-17
Payer: COMMERCIAL

## 2024-07-17 VITALS
RESPIRATION RATE: 18 BRPM | DIASTOLIC BLOOD PRESSURE: 74 MMHG | HEART RATE: 62 BPM | BODY MASS INDEX: 31.28 KG/M2 | OXYGEN SATURATION: 98 % | WEIGHT: 194.63 LBS | HEIGHT: 66 IN | TEMPERATURE: 98 F | SYSTOLIC BLOOD PRESSURE: 122 MMHG

## 2024-07-17 DIAGNOSIS — G47.33 OBSTRUCTIVE SLEEP APNEA: ICD-10-CM

## 2024-07-17 DIAGNOSIS — E66.9 OBESITY (BMI 30-39.9): ICD-10-CM

## 2024-07-17 DIAGNOSIS — Z00.00 ROUTINE GENERAL MEDICAL EXAMINATION AT A HEALTH CARE FACILITY: Primary | ICD-10-CM

## 2024-07-17 DIAGNOSIS — F41.9 ANXIETY AND DEPRESSION: ICD-10-CM

## 2024-07-17 DIAGNOSIS — L98.9 SKIN LESION OF RIGHT LEG: ICD-10-CM

## 2024-07-17 DIAGNOSIS — R73.9 BLOOD GLUCOSE ELEVATED: ICD-10-CM

## 2024-07-17 DIAGNOSIS — F32.A ANXIETY AND DEPRESSION: ICD-10-CM

## 2024-07-17 DIAGNOSIS — E78.5 DYSLIPIDEMIA: ICD-10-CM

## 2024-07-17 PROCEDURE — 99396 PREV VISIT EST AGE 40-64: CPT | Performed by: INTERNAL MEDICINE

## 2024-07-17 PROCEDURE — 3078F DIAST BP <80 MM HG: CPT | Performed by: INTERNAL MEDICINE

## 2024-07-17 PROCEDURE — 3074F SYST BP LT 130 MM HG: CPT | Performed by: INTERNAL MEDICINE

## 2024-07-17 PROCEDURE — 3008F BODY MASS INDEX DOCD: CPT | Performed by: INTERNAL MEDICINE

## 2024-07-17 RX ORDER — ALPRAZOLAM 0.25 MG/1
0.25 TABLET ORAL NIGHTLY PRN
Qty: 10 TABLET | Refills: 0 | Status: SHIPPED | OUTPATIENT
Start: 2024-07-17

## 2024-07-17 NOTE — PROGRESS NOTES
Chief Complaint   Patient presents with    Well Adult     EJ RM 3- Pt is here for yearly physical       HPI:    Pleasant  patient with kids here for CPE  S/p hysterectomy 2016 for benign disease, pap no longer indicated  H/o left breast papilloma s/p excision with benign path, she has mammogram ordered for Jan 2025 by Dr. Martell. No breast complaints today  Migraines doing better now that she is using cpap for BENITA. Anxiety controlled with Bupropion, rarely uses xanax prn.  C/o bump on right inner thigh for 4 months, no pain, no open sores.  TSH normal on levothyroxine 75mcg daily. Lipids not controlled (TG unusually high this year)- discussed heart healthy diet and rpt labs in 3 months  Pre dm- she is compliant with metformin, Hgba1c 6.2       Review of Systems   Constitutional: Negative for fever  HENT: Negative for hearing loss, congestion  Eyes: Negative for pain and visual disturbance.   Respiratory: Negative for cough, chest tightness  Cardiovascular: Negative for chest pain, palpitations    Gastrointestinal: Negative for nausea, vomiting  Genitourinary: Negative for dysuria, hematuria   Skin:as above  Neurological: Negative for dizziness, syncope  Hematological: Negative for adenopathy.   Psychiatric/Behavioral: No depression.    Patient Active Problem List   Diagnosis    Migraine with aura    Anxiety and depression    Attention deficit hyperactivity disorder (ADHD), predominantly inattentive type    Blood glucose elevated    Abnormal TSH    Snoring    Atypical pigmented skin lesion    Special screening for malignant neoplasm of colon    Obstructive sleep apnea    Hypersomnia    Intraductal papilloma    Obesity (BMI 30-39.9)    Glucose intolerance    Other specified hypothyroidism    Abnormal mammogram    Abnormal mammogram of left breast    Dyslipidemia    Skin lesion of right leg       Past Medical History:    Anxiety    Decorative tattoo    Depression    Disorder of thyroid    History of gestational  diabetes in prior pregnancy, currently pregnant (HCC)    HPV in female    Migraine    Migraines    Prediabetes    Sterilization    Essure    Wears glasses     Past Surgical History:   Procedure Laterality Date    Cholecystectomy  2004    Hysterectomy      partial hyst    Hysteroscopy, sterilization  2011    Essure          x3    Tonsillectomy  2010     Family History   Problem Relation Age of Onset    Diabetes Mother     Hypertension Mother     Lipids Mother     Other (Other) Mother         thyroid disease    Diabetes Maternal Grandmother     Hypertension Maternal Grandmother     Heart Attack Maternal Grandfather     Diabetes Maternal Grandfather     Hypertension Maternal Grandfather     Heart Disorder Maternal Grandfather         heart attack    Diabetes Paternal Grandmother     Diabetes Paternal Grandfather     Cancer Maternal Aunt 57        lung cancer     Social History     Socioeconomic History    Marital status:    Tobacco Use    Smoking status: Never    Smokeless tobacco: Never   Vaping Use    Vaping status: Never Used   Substance and Sexual Activity    Alcohol use: No    Drug use: No    Sexual activity: Yes     Partners: Male     Comment: Essure   Social History Narrative    ** Merged History Encounter **            Current Outpatient Medications   Medication Sig Dispense Refill    ALPRAZolam (XANAX) 0.25 MG Oral Tab Take 1 tablet (0.25 mg total) by mouth nightly as needed. 10 tablet 0    diclofenac (VOLTAREN) 1 % External Gel Apply 4 g topically every 6 (six) hours as needed. 1 each 2    diclofenac 75 MG Oral Tab EC Take 1 tablet (75 mg total) by mouth 2 (two) times daily. 28 tablet 1    albuterol 108 (90 Base) MCG/ACT Inhalation Aero Soln Inhale 2 puffs into the lungs every 4 (four) hours as needed for Wheezing (cough). 1 each 0    buPROPion  MG Oral Tablet 24 Hr Take 1 tablet (300 mg total) by mouth daily. 90 tablet 3    metFORMIN  MG Oral Tablet 24 Hr Take 2  tablets (1,000 mg total) by mouth daily with breakfast. 180 tablet 1    levothyroxine 75 MCG Oral Tab Take 1 tablet (75 mcg total) by mouth before breakfast. 90 tablet 1    DICLOFENAC 75 MG Oral Tab EC TAKE 1 TABLET(75 MG) BY MOUTH DAILY AS NEEDED FOR PAIN 30 tablet 1    ondansetron (ZOFRAN) 4 mg tablet Take 1 tablet (4 mg total) by mouth every 8 (eight) hours as needed for Nausea. 30 tablet 1    SUMAtriptan 25 MG Oral Tab TAKE 1 TABLET BY MOUTH AS NEEDED FOR MIGRAINE. MAY REPEAT IN 2 HOURS IF NEEDED(MAX 2 TABLETS PER DAY) AS DIRECTED 9 tablet 1    naproxen 500 MG Oral Tab Take 1 tablet (500 mg total) by mouth 2 (two) times daily as needed. 60 tablet 1    benzonatate 200 MG Oral Cap Take 1 capsule (200 mg total) by mouth 3 (three) times daily as needed for cough. (Patient not taking: Reported on 6/12/2024) 45 capsule 0    Ciclopirox 8 % External Solution Apply to affected nail nightly (Patient not taking: Reported on 6/12/2024) 6 mL 1       Allergies  Allergies   Allergen Reactions    Shrimp TONGUE SWELLING       Health Maintenance  Immunizations:  Immunization History   Administered Date(s) Administered    Covid-19 Vaccine Future Domain (J&J) 0.5ml 04/10/2021    Covid-19 Vaccine Pfizer 30 mcg/0.3 ml 10/24/2021    FLULAVAL 6 months & older 0.5 ml Prefilled syringe (84185) 12/12/2019, 12/30/2022    FLUZONE 6 months and older PFS 0.5 ml (84392) 09/19/2016, 09/20/2021    Flucelvax 0.5ml Vaccine 09/28/2023    Influenza 10/13/2010    Pfizer Covid-19 Vaccine 30mcg/0.3ml 12yrs+ ([id number]) 09/28/2023    TD 01/01/2008    TDAP 12/12/2019    Td 01/01/2008         Physical Exam  /74   Pulse 62   Temp 97.6 °F (36.4 °C) (Temporal)   Resp 18   Ht 5' 6\" (1.676 m)   Wt 194 lb 9.6 oz (88.3 kg)   LMP 05/28/2012 (LMP Unknown)   SpO2 98%   BMI 31.41 kg/m²   Constitutional: Oriented to person, place, and time. No distress.   HEENT:  Normocephalic and atraumatic. Hearing and tympanic membranes normal.   Eyes: Conjunctivae and EOM  are normal. PERRLA. No scleral icterus.   Neck: Normal range of motion. Neck supple.   Cardiovascular: Normal rate, regular rhythm and intact distal pulses.    Pulmonary/Chest: Effort normal and breath sounds normal.   Abdominal: Soft. Bowel sounds are normal. Non tender, ND  Neurological: No cranial nerve deficit or sensory deficit. Normal muscle tone. Coordination normal.   Skin: pea sized cystic lesion on right inner thigh, no TTP, no overlying warmth or redness  Psychiatric: Normal mood and affect.     A/P:    Encounter Diagnoses   Name           Routine general medical examination at a health care facility- she is up to date on mammogram- there is diagnostic mammo ordered for Jan 2025 by Dr. Martell for h/o left breast papilloma, pap not indicated s/p hysterectomy, encouraged healthy diet and regular exercise     Obesity (BMI 30-39.9)- referred to [facility], discussed diet and exercise     Skin lesion of right leg- referred to derm     Blood glucose elevated, pre dm- continue metformin, monitor hgba1c     Dyslipidemia- heart healthy diet, repeat lipids in 3 months     Obstructive sleep apnea- continue CPAP            Anxiety and depression- controlled, CPM       Orders Placed This Encounter   Procedures    Lipid Panel [E]    Hemoglobin A1C [E]       Meds & Refills for this Visit:  Requested Prescriptions     Signed Prescriptions Disp Refills    ALPRAZolam (XANAX) 0.25 MG Oral Tab 10 tablet 0     Sig: Take 1 tablet (0.25 mg total) by mouth nightly as needed.       Imaging & Consults:  DERM - INTERNAL  OP REFERRAL TO [facility]      Return if symptoms worsen or fail to improve.    There are no Patient Instructions on file for this visit.    All questions were answered and the patient understands the plan.

## 2024-07-19 ENCOUNTER — OFFICE VISIT (OUTPATIENT)
Dept: OCCUPATIONAL MEDICINE | Facility: HOSPITAL | Age: 49
End: 2024-07-19
Attending: FAMILY MEDICINE
Payer: COMMERCIAL

## 2024-07-19 PROCEDURE — 97035 APP MDLTY 1+ULTRASOUND EA 15: CPT

## 2024-07-19 PROCEDURE — 97140 MANUAL THERAPY 1/> REGIONS: CPT

## 2024-07-19 PROCEDURE — 97110 THERAPEUTIC EXERCISES: CPT

## 2024-07-19 NOTE — PROGRESS NOTES
Diagnosis:   Sprain of metacarpophalangeal (MCP) joint of right thumb, subsequent encounter (M90.692B)  Sprain of interphalangeal joint of right thumb, subsequent encounter (S61.294D)        Referring Provider: Nba Driscoll  Date of Evaluation:    7/16/2024    Precautions:  None Next MD visit:   none scheduled  Date of Surgery: n/a   Insurance Primary/Secondary: [employer] [place] PPO    # Auth Visits: 2/8            Subjective: Patient presents to OT appt today noting she has been completing home exercises for ROM. 4/10 aching to IP joint of R thumb at times with palpation and movement but no more pain to UCL area.      Objective:    ORTHOTICS:   Thumb Spica Splint w/ IP Joint Free - to be worn as needed during activity/sleeping     SCAR: n/a     SENSORY: Patient denies any numbness/tingling.     CIRCUMFERENTIAL EDEMA (cm):  D1 (Thumb) P1: R=6.7cm; L=6.2cm      AROM (degrees): Patient noted with BUE AROM WFL for shoulders, elbows, wrists, and digits except noted below     RIGHT HAND:   RUE Thumb   MP 0-30   IP 0-25   NAILS 55      RUE Thumb   Palmar Abduction 0-55   Radial Abduction 0-50      LEFT HAND:   LUE Thumb   MP 0-45   IP 0-45   NAILS 90      LUE Thumb   Palmar Abduction 0-75   Radial Abduction 0-75      Assessment: Patient progressing with therapy goals but continues to have joint stiffness and aching pain to IP joint of R thumb that increases with movement. Focus on ROM and slow progression towards light activity and strengthening.      Goals: (to be met in 8 visits)   1. Patient will demonstrate at least WFL AROM R thumb palmar abduction indicating increased ability to manage functional ADL/IADLs.  2. Patient will demonstrate full AROM NAILS for R thumb indicating increased ability to manage functional ADL/IADLs.  3. Patient will demonstrate WFL R thumb 3-Point Pinch strength indicating increased ability to manage functional ADL/IADLs  4. Patient will be independent and compliant with comprehensive HEP to maintain  progress achieved in OT.     Plan: Patient will continue to be seen for 2x/week or a total of 8 visits over a 90 day period.  Treatment will include: Manual Therapy, Neuromuscular Re-education, Self-Care Home Management, Therapeutic Activities, Therapeutic Exercise, and Home Exercise Program instruction, Splinting, Modalities PRN    Date: 7/19/2024  TX#: 2/8 Date:                 TX#: 3/8 Date:                 TX#: 4/8 Date:                 TX#: 5/8 Date:   Tx#: 6/8   -5 min hot pack to R hand  -Ultrasound treatment to R thumb IP joint area w/ settings 3.3 MHz, 1.2 W/cm2, 50% duty for 8 min  -Joint Mobilization and PROM stretching for R thumb  -I-Strip R Thumb EPL support 50% stretch  -I-Strip R Thumb PIP Joint support 50% stretch  -60 [place] [facility]/In-Hand Manipulation thumb exercise for sorting  -Perfection FMC/In-Hand Manipulation and coordination sorting activity         HEP:   -Thumb ROM Exercises  -Green/Yellow Thumb Flexion Sponge    Charges: USx1, MTx1, TEx1       Total Timed Treatment: 45 min  Total Treatment Time: 45 min

## 2024-07-22 ENCOUNTER — TELEPHONE (OUTPATIENT)
Dept: PHYSICAL THERAPY | Age: 49
End: 2024-07-22

## 2024-07-22 ENCOUNTER — APPOINTMENT (OUTPATIENT)
Dept: OCCUPATIONAL MEDICINE | Facility: HOSPITAL | Age: 49
End: 2024-07-22
Attending: FAMILY MEDICINE
Payer: COMMERCIAL

## 2024-07-24 ENCOUNTER — OFFICE VISIT (OUTPATIENT)
Dept: OCCUPATIONAL MEDICINE | Facility: HOSPITAL | Age: 49
End: 2024-07-24
Attending: FAMILY MEDICINE
Payer: COMMERCIAL

## 2024-07-24 PROCEDURE — 97035 APP MDLTY 1+ULTRASOUND EA 15: CPT

## 2024-07-24 PROCEDURE — 97140 MANUAL THERAPY 1/> REGIONS: CPT

## 2024-07-24 NOTE — PROGRESS NOTES
Diagnosis:   Sprain of metacarpophalangeal (MCP) joint of right thumb, subsequent encounter (M88.815B)  Sprain of interphalangeal joint of right thumb, subsequent encounter (S60.869L)        Referring Provider: Nba Driscoll  Date of Evaluation:    7/16/2024    Precautions:  None Next MD visit:   none scheduled  Date of Surgery: n/a   Insurance Primary/Secondary: [employer] [place]    # Auth Visits: 3/8            Subjective: Patient notes she has been completing home exercises. Some swelling to thumb IP joint since yesterday but overall feeling improved motion to thumb. 2/10 pain rated at most.      Objective:    ORTHOTICS:   Thumb Spica Splint w/ IP Joint Free - to be worn as needed during activity/sleeping     CIRCUMFERENTIAL EDEMA (cm):  D1 (Thumb) P1: R=6.7cm; L=6.2cm      AROM (degrees): Patient noted with BUE AROM WFL for shoulders, elbows, wrists, and digits except noted below     RIGHT HAND:   RUE Thumb   MP 0-30   IP 0-25   NAILS 55      RUE Thumb   Palmar Abduction 0-55   Radial Abduction 0-50      LEFT HAND:   LUE Thumb   MP 0-45   IP 0-45   NAILS 90      LUE Thumb   Palmar Abduction 0-75   Radial Abduction 0-75      Assessment: Patient progressing with therapy goals but continues to have joint stiffness and aching pain to IP joint of R thumb that increases with movement. Focus on ROM and slow progression towards light activity and strengthening.      Goals: (to be met in 8 visits)   1. Patient will demonstrate at least WFL AROM R thumb palmar abduction indicating increased ability to manage functional ADL/IADLs.  2. Patient will demonstrate full AROM NAILS for R thumb indicating increased ability to manage functional ADL/IADLs.  3. Patient will demonstrate WFL R thumb 3-Point Pinch strength indicating increased ability to manage functional ADL/IADLs  4. Patient will be independent and compliant with comprehensive HEP to maintain progress achieved in OT.     Plan: Patient will continue to be seen for 2x/week or a  total of 8 visits over a 90 day period.  Treatment will include: Manual Therapy, Neuromuscular Re-education, Self-Care Home Management, Therapeutic Activities, Therapeutic Exercise, and Home Exercise Program instruction, Splinting, Modalities PRN    Date: 7/19/2024  TX#: 2/8 Date: 7/24/2024            TX#: 3/8 Date:                 TX#: 4/8 Date:                 TX#: 5/8 Date:   Tx#: 6/8   -5 min hot pack to R hand  -Ultrasound treatment to R thumb IP joint area w/ settings 3.3 MHz, 1.2 W/cm2, 50% duty for 8 min  -Joint Mobilization and PROM stretching for R thumb  -I-Strip R Thumb EPL support 50% stretch  -I-Strip R Thumb PIP Joint support 50% stretch  -60 [place] [facility]/In-Hand Manipulation thumb exercise for sorting  -Perfection FMC/In-Hand Manipulation and coordination sorting activity -5 min hot pack to R hand  -Ultrasound treatment to R thumb IP joint area w/ settings 3.3 MHz, 1.2 W/cm2, 50% duty for 8 min  -Joint Mobilization and PROM stretching for R thumb  -Review of HEP and POC        HEP:   -Thumb ROM Exercises  -Green/Yellow Thumb Flexion Sponge    Charges: USx1, MTx2     Total Timed Treatment: 40 min  Total Treatment Time: 40 min

## 2024-07-30 ENCOUNTER — OFFICE VISIT (OUTPATIENT)
Dept: OCCUPATIONAL MEDICINE | Facility: HOSPITAL | Age: 49
End: 2024-07-30
Attending: FAMILY MEDICINE
Payer: COMMERCIAL

## 2024-07-30 PROCEDURE — 97140 MANUAL THERAPY 1/> REGIONS: CPT

## 2024-07-30 PROCEDURE — 97035 APP MDLTY 1+ULTRASOUND EA 15: CPT

## 2024-07-30 NOTE — PROGRESS NOTES
Diagnosis:   Sprain of metacarpophalangeal (MCP) joint of right thumb, subsequent encounter (Y88.661E)  Sprain of interphalangeal joint of right thumb, subsequent encounter (E80.912K)        Referring Provider: Nba Driscoll  Date of Evaluation:    7/16/2024    Precautions:  None Next MD visit:   none scheduled  Date of Surgery: n/a   Insurance Primary/Secondary: [employer] [place]    # Auth Visits: 4/8            Subjective: Patient notes pain to R thumb improving a tight 2/10 to IP joint with movement. Completing exercises at home.      Objective:    ORTHOTICS:   Thumb Spica Splint w/ IP Joint Free - to be worn as needed during activity/sleeping     CIRCUMFERENTIAL EDEMA (cm):  D1 (Thumb) P1: R=6.7cm; L=6.2cm      AROM (degrees): Patient noted with BUE AROM WFL for shoulders, elbows, wrists, and digits except noted below     RIGHT HAND:   RUE Thumb   MP 0-30   IP 0-25   NAILS 55      RUE Thumb   Palmar Abduction 0-55   Radial Abduction 0-50      LEFT HAND:   LUE Thumb   MP 0-45   IP 0-45   NAILS 90      LUE Thumb   Palmar Abduction 0-75   Radial Abduction 0-75      Assessment: Provided patient with compression digit sleeve to wear as needed and yellow theraputty added to HEP. Patient progressing with therapy goals but continues to have joint stiffness and aching pain to IP joint of R thumb that increases with movement. Focus on ROM and slow progression towards light activity and strengthening.      Goals: (to be met in 8 visits)   1. Patient will demonstrate at least WFL AROM R thumb palmar abduction indicating increased ability to manage functional ADL/IADLs.  2. Patient will demonstrate full AROM NAILS for R thumb indicating increased ability to manage functional ADL/IADLs.  3. Patient will demonstrate WFL R thumb 3-Point Pinch strength indicating increased ability to manage functional ADL/IADLs  4. Patient will be independent and compliant with comprehensive HEP to maintain progress achieved in OT.     Plan: Patient will  continue to be seen for 2x/week or a total of 8 visits over a 90 day period.  Treatment will include: Manual Therapy, Neuromuscular Re-education, Self-Care Home Management, Therapeutic Activities, Therapeutic Exercise, and Home Exercise Program instruction, Splinting, Modalities PRN    Date: 7/19/2024  TX#: 2/8 Date: 7/24/2024            TX#: 3/8 Date: 7/30/2024            TX#: 4/8 Date:                 TX#: 5/8 Date:   Tx#: 6/8   -5 min hot pack to R hand  -Ultrasound treatment to R thumb IP joint area w/ settings 3.3 MHz, 1.2 W/cm2, 50% duty for 8 min  -Joint Mobilization and PROM stretching for R thumb  -I-Strip R Thumb EPL support 50% stretch  -I-Strip R Thumb PIP Joint support 50% stretch  -60 [place] [facility]/In-Hand Manipulation thumb exercise for sorting  -Perfection [facility]/In-Hand Manipulation and coordination sorting activity -5 min hot pack to R hand  -Ultrasound treatment to R thumb IP joint area w/ settings 3.3 MHz, 1.2 W/cm2, 50% duty for 8 min  -Joint Mobilization and PROM stretching for R thumb  -Review of HEP and POC -5 min hot pack to R hand  -Ultrasound treatment to R thumb IP joint area w/ settings 3.3 MHz, 1.2 W/cm2, 50% duty for 8 min  -Joint Mobilization and PROM stretching for R thumb  -48 [place] In-Hand Manipulation and sorting activity  -Yellow Theraputty Strengthening Exercises       HEP:   -Thumb ROM Exercises  -Green/Yellow Thumb Flexion Sponge  -Yellow Theraputty Strengthening Exercises    Charges: USx1, MTx2     Total Timed Treatment: 40 min  Total Treatment Time: 40 min

## 2024-08-01 ENCOUNTER — OFFICE VISIT (OUTPATIENT)
Dept: OCCUPATIONAL MEDICINE | Facility: HOSPITAL | Age: 49
End: 2024-08-01
Attending: FAMILY MEDICINE
Payer: COMMERCIAL

## 2024-08-01 PROCEDURE — 97110 THERAPEUTIC EXERCISES: CPT

## 2024-08-01 PROCEDURE — 97035 APP MDLTY 1+ULTRASOUND EA 15: CPT

## 2024-08-01 PROCEDURE — 97140 MANUAL THERAPY 1/> REGIONS: CPT

## 2024-08-01 NOTE — PROGRESS NOTES
Diagnosis:   Sprain of metacarpophalangeal (MCP) joint of right thumb, subsequent encounter (L85.588W)  Sprain of interphalangeal joint of right thumb, subsequent encounter (S67.049A)        Referring Provider: Nba Driscoll  Date of Evaluation:    7/16/2024    Precautions:  None Next MD visit:   none scheduled  Date of Surgery: n/a   Insurance Primary/Secondary: [employer] [place]    # Auth Visits: 5/8            Subjective: Patient notes R thumb feeling better. 2/10 achiness to dorsal IP joint with movement but overall completing home exercises with no issue.      Objective:    ORTHOTICS:   Thumb Spica Splint w/ IP Joint Free - to be worn as needed during activity/sleeping     CIRCUMFERENTIAL EDEMA (cm):  D1 (Thumb) P1: R=6.7cm; L=6.2cm      AROM (degrees): Patient noted with BUE AROM WFL for shoulders, elbows, wrists, and digits except noted below     RIGHT HAND:   RUE Thumb   MP 0-50   IP 0-50   NAILS 100      RUE Thumb   Palmar Abduction 0-55   Radial Abduction 0-50      LEFT HAND:   LUE Thumb   MP 0-45   IP 0-45   NAILS 90      LUE Thumb   Palmar Abduction 0-75   Radial Abduction 0-75       Strength: R=38.4 lbs; L=69.8 lbs  3-Point Pinch: R=9.0 lbs; L=19.0 lbs  2-Point Pinch: R=3.0 lbs; L=9.0 lbs  Lateral Pinch: R=12.0 lbs; L=22.0 lbs    Assessment: Patient RUE thumb ROM and strength improving and patient provided HEP exercises. Patient scheduled for 5 visits and 8 in POC. Patient reports wanting to reach out to physician next and may return for additional visits as needed. At this time, patient independent with current HEP exercises and recommendations.      Goals: (to be met in 8 visits)   1. Patient will demonstrate at least WFL AROM R thumb palmar abduction indicating increased ability to manage functional ADL/IADLs.  2. Patient will demonstrate full AROM NAILS for R thumb indicating increased ability to manage functional ADL/IADLs.  3. Patient will demonstrate WFL R thumb 3-Point Pinch strength indicating  increased ability to manage functional ADL/IADLs  4. Patient will be independent and compliant with comprehensive HEP to maintain progress achieved in OT.     Plan: Patient will continue to be seen for 2x/week or a total of 8 visits over a 90 day period.  Treatment will include: Manual Therapy, Neuromuscular Re-education, Self-Care Home Management, Therapeutic Activities, Therapeutic Exercise, and Home Exercise Program instruction, Splinting, Modalities PRN    Date: 7/19/2024  TX#: 2/8 Date: 7/24/2024            TX#: 3/8 Date: 7/30/2024            TX#: 4/8 Date: 8/1/2024                TX#: 5/8 Date:   Tx#: 6/8   -5 min hot pack to R hand  -Ultrasound treatment to R thumb IP joint area w/ settings 3.3 MHz, 1.2 W/cm2, 50% duty for 8 min  -Joint Mobilization and PROM stretching for R thumb  -I-Strip R Thumb EPL support 50% stretch  -I-Strip R Thumb PIP Joint support 50% stretch  -60 [place] [facility]/In-Hand Manipulation thumb exercise for sorting  -Perfection [facility]/In-Hand Manipulation and coordination sorting activity -5 min hot pack to R hand  -Ultrasound treatment to R thumb IP joint area w/ settings 3.3 MHz, 1.2 W/cm2, 50% duty for 8 min  -Joint Mobilization and PROM stretching for R thumb  -Review of HEP and POC -5 min hot pack to R hand  -Ultrasound treatment to R thumb IP joint area w/ settings 3.3 MHz, 1.2 W/cm2, 50% duty for 8 min  -Joint Mobilization and PROM stretching for R thumb  -48 [place] In-Hand Manipulation and sorting activity  -Yellow Theraputty Strengthening Exercises -5 min hot pack to R hand  -Ultrasound treatment to R thumb IP joint area w/ settings 3.3 MHz, 1.2 W/cm2, 50% duty for 8 min  -Joint Mobilization and PROM stretching for R thumb  -Assessment of /Pinch  -Green/Red  Clip 3-Point Pinch chinese  sorting and coordination exercise  -Red Theraputty Strengthening Exercises      HEP:   -Thumb ROM Exercises  -Green/Yellow Thumb Flexion Sponge  -Yellow/Red Theraputty Strengthening  Exercises    Charges: USx1, MTx1, TEx1   Total Timed Treatment: 40 min  Total Treatment Time: 40 min

## 2024-08-05 ENCOUNTER — OFFICE VISIT (OUTPATIENT)
Facility: CLINIC | Age: 49
End: 2024-08-05
Payer: COMMERCIAL

## 2024-08-05 VITALS — HEIGHT: 66 IN | WEIGHT: 194 LBS | BODY MASS INDEX: 31.18 KG/M2

## 2024-08-05 DIAGNOSIS — S63.621D SPRAIN OF INTERPHALANGEAL JOINT OF RIGHT THUMB, SUBSEQUENT ENCOUNTER: ICD-10-CM

## 2024-08-05 DIAGNOSIS — S63.641D SPRAIN OF METACARPOPHALANGEAL (MCP) JOINT OF RIGHT THUMB, SUBSEQUENT ENCOUNTER: Primary | ICD-10-CM

## 2024-08-05 PROCEDURE — 3008F BODY MASS INDEX DOCD: CPT | Performed by: FAMILY MEDICINE

## 2024-08-05 PROCEDURE — 99213 OFFICE O/P EST LOW 20 MIN: CPT | Performed by: FAMILY MEDICINE

## 2024-08-05 NOTE — PROGRESS NOTES
Sports Medicine Clinic Note    Subjective:    Chief Complaint: Improved right thumb MCP joint pain, ongoing complaint of right thumb IP joint pain.    Date of Injury: 6/11/24    Interval History: The patient is a 48-year-old right-hand-dominant female who presents for follow-up regarding her right thumb injury sustained on 6/11/24. She reports significant improvement in the MCP joint pain since the last visit. However, the pain at the IP joint of the thumb is now her primary complaint. The patient continues to use the thumb spica splint and adheres to her NSAID regimen but has been having some stomach upset with this. She has been actively participating in occupational therapy, which she finds beneficial. Denies any new injuries or worsening of symptoms at the MCP joint.    Objective:    Right Thumb Examination:    Inspection:  No visible swelling or erythema  No deformity noted    Palpation:  Mild tenderness at the thumb IP joint  No tenderness over the ulnar collateral ligament of the MCP joint  No tenderness over the radial collateral ligament    Range of Motion:  Slightly limited flexion and extension of the thumb MCP joint with mild discomfort at the end range  Limited flexion and extension of the thumb IP joint with noted pain    Neurovascular:  Intact sensation in all distributions  Capillary refill less than 2 seconds in the thumb  Strong radial pulse    Special Tests:  Negative valgus stress test at the thumb IP and MCP joints    Diagnostic Tests:    No new testing.    MRI of the right hand (previously reviewed):  Subcutaneous soft tissue edema at the base of the thumb.  No fractures or focal bone lesions.  Joint spaces are preserved with no dislocations.  Mild increased signal and probable mild partial tearing of the thumb MCP joint ulnar collateral ligament at its proximal phalanx base attachment. No ligamentous retraction or Stener lesion.  Mild thickening and minimal increased signal in the thumb  interphalangeal (IP) joint ulnar collateral ligament without discrete tear.  Intact thumb radial collateral ligaments.  Intact flexor and extensor tendons.  Normal signal characteristics of the musculature.    Assessment:    Mild partial tear of the thumb MCP joint ulnar collateral ligament, improving.  Mild subacute/chronic sprain of the thumb IP joint ulnar collateral ligament, currently symptomatic.    Plan:    Additional Imaging/Workup:  No additional imaging required at this time.    Therapy:  Continue with occupational therapy focusing on thumb strengthening and range of motion exercises, with increased emphasis on the IP joint. Gradually increase intensity as tolerated.    Medications:  Continue using topical NSAIDs for pain management.    Procedures:  Deferring corticosteroid injection for the time being. Corticosteroid injection at the IPJ can be considered in the future if symptoms persist or worsen.    Activity Recommendations:  Advise the patient to avoid activities that require significant thumb grasping and pinching until full strength and function are regained.  Encourage gradual reintroduction of normal activities, guided by symptom improvement and therapy progress.    Follow-Up:  Tentative follow-up is scheduled in 4-6 weeks to reassess the thumb's stability, pain level, and overall function. Instruct the patient to return earlier if symptoms worsen or new symptoms develop.      Nba Driscoll DO, CAQSM   Primary Care Sports Medicine    Department of Orthopaedic Surgery  [facility]    [address] [place] [id number]  [address] [place] [id number]    t: [phone]  f: [phone]      [email or web address]

## 2024-08-14 ENCOUNTER — OFFICE VISIT (OUTPATIENT)
Dept: INTERNAL MEDICINE CLINIC | Facility: CLINIC | Age: 49
End: 2024-08-14
Payer: COMMERCIAL

## 2024-08-14 VITALS
SYSTOLIC BLOOD PRESSURE: 108 MMHG | DIASTOLIC BLOOD PRESSURE: 76 MMHG | HEART RATE: 63 BPM | HEIGHT: 64.5 IN | BODY MASS INDEX: 32.21 KG/M2 | WEIGHT: 191 LBS | RESPIRATION RATE: 16 BRPM

## 2024-08-14 DIAGNOSIS — G47.33 OSA ON CPAP: ICD-10-CM

## 2024-08-14 DIAGNOSIS — E03.9 HYPOTHYROIDISM, UNSPECIFIED TYPE: ICD-10-CM

## 2024-08-14 DIAGNOSIS — E66.9 CLASS 1 OBESITY WITH SERIOUS COMORBIDITY AND BODY MASS INDEX (BMI) OF 31.0 TO 31.9 IN ADULT, UNSPECIFIED OBESITY TYPE: ICD-10-CM

## 2024-08-14 DIAGNOSIS — G43.109 MIGRAINE WITH AURA AND WITHOUT STATUS MIGRAINOSUS, NOT INTRACTABLE: ICD-10-CM

## 2024-08-14 DIAGNOSIS — K58.2 IRRITABLE BOWEL SYNDROME WITH BOTH CONSTIPATION AND DIARRHEA: ICD-10-CM

## 2024-08-14 DIAGNOSIS — R73.03 PREDIABETES: ICD-10-CM

## 2024-08-14 DIAGNOSIS — E78.1 HYPERTRIGLYCERIDEMIA: ICD-10-CM

## 2024-08-14 DIAGNOSIS — Z51.81 ENCOUNTER FOR THERAPEUTIC DRUG MONITORING: Primary | ICD-10-CM

## 2024-08-14 PROBLEM — E66.811 CLASS 1 OBESITY WITH SERIOUS COMORBIDITY AND BODY MASS INDEX (BMI) OF 31.0 TO 31.9 IN ADULT: Status: ACTIVE | Noted: 2024-01-23

## 2024-08-14 PROCEDURE — 3074F SYST BP LT 130 MM HG: CPT | Performed by: NURSE PRACTITIONER

## 2024-08-14 PROCEDURE — 3008F BODY MASS INDEX DOCD: CPT | Performed by: NURSE PRACTITIONER

## 2024-08-14 PROCEDURE — 99214 OFFICE O/P EST MOD 30 MIN: CPT | Performed by: NURSE PRACTITIONER

## 2024-08-14 PROCEDURE — 3078F DIAST BP <80 MM HG: CPT | Performed by: NURSE PRACTITIONER

## 2024-08-14 RX ORDER — PHENTERMINE HYDROCHLORIDE 15 MG/1
15 CAPSULE ORAL EVERY MORNING
Qty: 30 CAPSULE | Refills: 3 | Status: SHIPPED | OUTPATIENT
Start: 2024-08-14

## 2024-08-14 RX ORDER — TOPIRAMATE 25 MG/1
25 TABLET ORAL 2 TIMES DAILY
Qty: 60 TABLET | Refills: 3 | Status: SHIPPED | OUTPATIENT
Start: 2024-08-14

## 2024-08-14 NOTE — PATIENT INSTRUCTIONS
Welcome to the [facility]...your Lifestyle Renovation begins now!  Thank you for placing your trust in our health care team, I look forward to working with you along this journey to better health!    Next steps:     1.  Call our office at [phone] to schedule a personal nutrition consultation with one of our registered dieticians, Jose Krause. Bring along your food journal (3 days minimum). See journal options below.  2.  Complete fasting (10-12 hours, water only) labs at [facility] lab site prior to next office visit. Lab results will be communicated via Minderest.  3.  Body composition completed today with findings of: Total body fat: 38.7% (goal <32%), Visceral Fat: 10 (goal <10) and Muscle mass: 15.6% (goal >18%).  4.   Fill your prescribed medication and take as discussed and prescribed: Start generic alternative to Qsymia (www.qsymia.com) with Phentermine and Topamax as directed. Option to increase Topamax after 1 month if tolerated well and <5# weight loss. Send Minderest message with status and current home scale weight for this adjustment if needed. Look into coverage for anti-obesity medication, such as Wegovy or Zepbound.  5.  Start VSL#3 probiotic as listed below for gut health and to reduce IBS symptoms.      Please try to work on the following dietary changes this first month:    1.  Drink water with meals and throughout the day, cut down on soda and/or juice if consumed. Consider flavored water options like Bubbly, Spindrift, Hint and Arben.  2.  Have protein with each meal, examples include: greek yogurt, cottage cheese, hard boiled egg, tofu, chicken, fish, or tuna. Recommended daily protein intake is 85 grams/day. Optimal protein intake is 105 grams/day.  3.  Work towards reducing/eliminating refined carbohydrates and sugars which includes items such as sweets, as well as rice, pasta, and bread and make sure to choose whole grain options when having them with  just 1 serving per meal about the size of your inner palm.  4.  Consume non starchy veggies daily working towards making them a good 50% of your daily food intake. Add them to lunch and dinner consistently.  5.  Start a daily probiotic: VSL#3 is recommended, (order on line at www.vsl3.com). Take 1 capsule daily with water for 30 days, then reduce to 1 every other day (this will reduce the cost). Capsules can be left out of refrigerator for 2 weeks. I recommend using a pill box weekly and keeping the bottle in the fridge.    Please download zane My Fitness Pal, LoseIt! Or Net Diary to monitor daily dietary intake and you will be able to see if you are eating the right amount of calories or too much or too little which would hinder weight loss. Additionally this will help to see your daily carbohydrate and protein intake. When you set the zane up choose 1-1.5 lbs/week as a goal.  Keeping a paper food journal is an option as well to remain accountable for your choices- this is the start to mindful eating! A low calorie diet has been consistently shown to support weight loss.    Continue or start exercising to help establish a routine. If not already exercising begin with 1 day/week and progress as able with the goal of working towards 30 minutes 5 days a week at a minimum. A variety or cardio, strength and stretching is important. Review resources below to help support you in building this healthy routine.    Meditation daily can help manage and control stress. Chronic stress can make weight loss difficult.  Exercising is one way to help with stress, but meditation using the CALM Zane or another comparable alternative can be done in your home or place of work with little time commitment. This Zane can also help work on behavior change and improve sleep. Check out the segment under Calm Masterclass and listen to The 4 Pillars of Health. A great way to begin learning about the foundation of lifestyle with practical tips to  use in your every day. In addition, we offer counseling services and support for individual connection and care. A referral is necessary so please let me know if this is a service you are interested.    Check out www.yourweightmatters.org blog for continued support and education along your weight loss journey to optimal health!      Patient Resources:    Personal Training/Fitness Classes/Health Coaching    [facility] in [place]: Full fitness center with group fitness and personal training located in [place].  Health Coaching with Nunu Hernandez, Stanley Reynolds, and Andrew Daly at our [facility]- individual coaching to work on your health goals. Call [phone] and/or email @ [email or web address]. Free 60 minute consult when client of [facility].  [facility] @ http://www.[employer] [place]. A variety of group fitness options plus various yoga classes [phone] and/or email Alexandra at [email or web address]  [facility] in the [place] Fitness Centers with multiple locations: Jawbone (www.Softec Internet), F45 Training (www.l62lerepfbo."Hammer [employer] Inc[employer]), Fit Body Bootcamp (www.WebStudiyo ProductionsbodyboLYFE Kitchenp."[employer] Inc."), Sync.ME (www.seoreseller.com), The Exercise  (www.exercisecoach.com), Club Pilates (www.clubpilates."[employer]    Online Fitness  Fitness  on Utube  Fit in 10 DVD series   www.erdbp85NEN."Hammer [employer]isel[employer] Inc."  Chair exercises via Sit and Be Fit (www.sitandbefit.org) and Qt Software (www.LDK Solar.com) or Harry Islas or Jared Ramos videos on YouTube.  Hip Hop Fit with Dinesh Douglas at www.hiphopfit.net    Apps for on the Go Fitness  Seaview 7 Minute Workout (orange box with white 7) - free on the go HIIT training zane  Peloton Zane @ www.onepeloton.com    Nutrition Trackers and Programs  LoseIT! And My Fitness Pal apps and on line for tracking nutrition  NOOM - virtual health coaching  FitFoundation (healthy meals on the go) in [place] @  www.gffuprcgazgos6j.com  Tyra GUTIERREZ @ www.bistromd.Giveter and Ousbmn58 (calorie smart and low carb plans recommended) @ www.bodhlv70.com, Metabolic Meals @ www.MyMetabolicMeals.com - individual prepared meals to go  Gobble, Blue Apron, Home , Every Plate, Sunbasket- on line meal delivery programs for preparation at home  [place] [place] in [place] for homemade meals to go @ www.Cartup Commerce  Diet Doctor @ www.dietdoctor.com - low carb swaps  OUYA - meal prep and planning michell (www.yummly.com)    Stress, Anxiety, Depression, Trauma  CALM meditation michell (www.calm.com)  Headspace  Don't let anxiety run your life. Using the science of emotion regulation and mindfulness to overcome fear and worry by Kenny Claire PsyD and Chris Ellington MA.  The [employer] Podcast (September 27, 2023): 6 Magic Words That Stop Anxiety  What Happened to You?- a look at the impact trauma has on behavior written by Bin Myers and Dr. Dipak Cain  Whole Again by Rajinder Nunes - discovering your true self after trauma    Mindful Eating/The Hungry Brain  Am I Hungry? Mindful eating virtual  michell (www.amihungry.com)  The Hungry Brain by Alyce Martinez, PhD  Mindless Eating by Jani Mckeon  Weight Loss Surgery Will Not Treat Food Addiction by Angy Gaston Ph.D    Metabolic Dysfunction, Hormones and Cravings  Why We Get Sick? By Remy Colin (insulin resistance)  Your Body in Balance: The New Science of Food, Hormones, and Health by Dr. Maverick Villalba  The Complete Guide to fasting by Dr. Bailey  Fast Like a Girl by Dr. Leighann Allen  The Menopause Reset by Dr. Leighann Allen  Sugar, Salt & Fat by Raegan Hanna, Ph.D, R.D.  The Truth About Sugar - documentary on sugar (Free on Dynamic Recreation, https://youtu.be/9Y7rurgQE1a)  Reverse Visceral Fat: #1 Way to Increase Your Lifespan & End Inflammation with Dr. Ta Reynolds on Utube @ https://youTacere Therapeuticsu.be/nupPRnvUpJY?[email or web address]    Nutrition Support  You Are What You Eat - Netfix series on twin  study looking at impact of nutrition changes on health  The End of Dieting: How to Live for Life by Dr. Tito Buchanan M.D. or listen to The Edgeio Podcast Episode 63: Understanding \"Nutritarian\" Eating w/Dr. Tito Buchanan  The Game Changers- Netflix Documentary on plant based nutrition  The Dr. Barfield T5 Wellness Plan by Dr. Donato Barfield MD  The Complete Guide to fasting by Dr. Bailey  @[facility] ([facility] Dietician with support surrounding nutrition and meal prep/planning)    Education, Motivation and Support Resources  Live to 100: Secrets of the Blue Zones - Netflix series on the secrets to communities living over 100 years old  Atomic Habits by Román Sebastian (a book about taking small steps to promote greater behavior change)   Motivation michell (black box with white \")- daily supportive messages sent to your phone  Can't Hurt Me by Kenny Webb (a book exploring the power of discipline in achieving your goals)  Fed Up - documentary about obesity (Free on Utube)  Www.yourweightmatters.org - Obesity Action Coalition sponsored Blog posts  Obesity Action Coalition Resources on topics specific to weight management (www.obesityaction.org)  Fitlosophy Fitspiration - journal to better health (journal book found at [employer] in fitness aisle)  Mini Mireles talk titled: The Call to Courage (Netflix)  The Exam Room by the Physician's Committee (Podcast)  Nutrition Facts by Dr. Flores (Podcast)    Balanced Nutrition includes:     Build the mentality of Food 4 Fuel. Clean eating with whole foods and eliminating/reducing ultra processed foods.  Be an intuitive eater and using mindful eating practices.  Eat a balanced plate with protein and produce at all meals: 1/4 plate- protein, 1/2 plate non starchy veggies, and 1/4 plate fruit or complex carbohydrate.  Drink water with all meals and use a salad plate to naturally reduce portions.  Eliminate/reduce late night eating by stopping after 7pm. Allowing your body to fast for  12 hours (drink only water, tea or black coffee without any additives).            Why We Gain Weight When We’re Stressed--And How Not To  The psychology and biology of stress-related overeating and weight gain   Emotional Eating under Stress  Have you ever found yourself mindlessly eating a tub of ice cream while you brood about your latest romantic rejection or eating a hamburger and fries in front of your computer as you furiously try to make a work deadline? Perhaps you’re a busy mom, eating cookies in your car as you shuttle the kids back and forth to a slew of activities. Or you’re a small business owner desperately trying to make ends meet when you suddenly realize your waistline has expanded. If you recognize yourself in any of these scenarios, you’re not alone and it’s probably not your fault. Stress that goes on for a long period is a triple whammy for weight--it increases our appetites, makes us hold onto the fat, and interferes with our willpower to implement a healthy lifestyle.  Below are the four major reasons stress leads to weight gain and four great research-based coping strategies you can use to fight back.  Hormones  When your brain detects the presence of a threat, no matter if it is a snake in the grass, a grumpy boss, or a big credit card bill, it triggers the release of a cascade of chemicals, including adrenaline, CRH, and cortisol. Your brain and body prepare to handle the threat by making you feel alert, ready for action and able to withstand an injury. In the short-term, adrenaline helps you feel less hungry as your blood flows away from the internal organs and to your large muscles to prepare for “fight or flight.” However, once the effects of adrenaline wear off, cortisol, known as the “stress hormone,” hangs around and starts signaling the body to replenish your food supply. Fighting off wild animals, like our ancestors did, used up a lot of energy, so their bodies needed more stores  of fat and glucose. Today’s human, who sits on the couch worrying about how to pay the bill or works long hours at the computer to make the deadline, does not work off much energy at all dealing with the stressor! Unfortunately, we are stuck with a neuroendocrine system that didn’t get the update, so your brain is still going to tell you to reach for that plate of cookies anyway.  Belly Fat  In the days when our ancestors were fighting off tigers and famine, their bodies adapted by learning to store fat supplies for the long haul. The unfortunate result for you and me is that when we are chronically stressed by life crises and work-life demands, we are prone to getting an extra layer of “visceral fat” deep in our bellies. Your belly has an ample supply of blood vessels and cortisol receptors to make the whole process flow more efficiently. The downside is that excess belly fat is unhealthy and difficult to get rid of. The fat releases chemicals triggering inflammation, which increases the likelihood that we will develop heart disease or diabetes. And it can make it more difficult to fit into those rebekah jeans you splurged on, leading to more stress about money wasted! Unfortunately, excess cortisol also slows down your metabolism, because your body wants to maintain an adequate supply of glucose for all that hard mental and physical work dealing with the threat.  Anxiety  When we have a surge of adrenaline as part of our fight/flight response, we get fidgety and activated. Adrenaline is the reason for the “wired up” feeling we get when we’re stressed. While we may burn off some extra calories fidgeting or running around cleaning because we can’t sit still, anxiety can also trigger “emotional eating.” Overeating or eating unhealthy foods in response to stress or as a way to calm down is a very common response. In the most recent American Psychological Association’s “Stress in [place]:” survey, a whopping 40% of  respondents reported dealing with stress in this way, while 42% reported watching television for more than 2 hours a day to deal with stress. Being a couch potato also increases the temptation to overeat and is inactive, which means that those extra calories aren’t getting burned off. Anxiety can also make you eat more “mindlessly” as you churn around worrying thoughts in your head, not even focusing on the taste of the food, how much you’ve eaten, or when you are feeling full. When you eat mindlessly, you will likely eat more, yet feel less satisfied.  Cravings and Fast Food  When we are chronically stressed, we crave “comfort foods,” such as a bag of potato chips or a tub of ice cream. These foods tend to be easy to eat, highly processed, and high in fat, sugar, or salt. We crave these foods for both biological and psychological reasons. Stress may mess up our brain’s reward system or cortisol may cause us to crave more fat and sugar. We also may have memories from childhood, such as the smell of freshly baked cookies,, that lead us to associate sweet foods with comfort. When we are stressed, we also may be more likely to drive through the Fast Food place, rather than taking the time and mental energy to plan and cook a meal. Americans are less likely to cook and eat dinner at home than people from many other countries, and they also work more hours. Working in urban areas may mean long, jammed commutes, which both increase stress and interfere with willpower because we are hungrier when we get home later. A [facility] research study showed, in laboratory mice, that being “stressed” by exposure to the smell of a predator lead the mice to eat more high-fat food pellets, when given the choice of eating these instead of normal feed.  Less Sleep  Do you ever lie awake at night worrying about paying the bills or about who will watch your kids when you have to go to work? According to the APA’s “Stress  in [place]” survey, more than 40% of us lie awake at night as a result of stress. Research shows that worry is a major cause of insomnia. Our minds are overactive and won’t switch off. We may also lose sleep because of pulling overnights to cram for exams or writing until the early hours. Stress causes decreased blood sugar, which leads to fatigue. If you drink coffee or caffeinated soft drinks to stay awake, or alcohol to feel better, your sleep cycle will be even more disrupted. Sleep is also a powerful factor influencing weight gain or loss. Lack of sleep may disrupt the functioning of ghrelin and leptin--chemicals that control appetite. We also crave carbs when we are tired or grumpy from lack of sleep. Finally, not getting our devonte zzzz’s erodes our willpower and ability to resist temptation. In one study, overweight/obese dieters were asked to follow a fixed calorie diet and assigned to get either 5 and a half or eight and a half hours of sleep a night (in a sleep lab). Those with sleep deprivation lost substantially less weight.  How to Minimize Weight Gain When You’re Stressed  Exercise  Aerobic exercise has a one-two punch. It can decrease cortisol and trigger release of chemicals that relieve pain and improve mood. It can also help speed your metabolism so you burn off the extra indulgences  Eat Mindfully  Mindful Eating programs train you in meditation, which helps you cope with stress, and change your consciousness around eating. You learn to slow down and tune in to your sensory experience of the food, including its sight, texture or smell. You also learn to tune into your subjective feelings of hunger or fullness, rather than eating just because it’s a mealtime or because there is food in front of you. A well-designed study of binge-eaters showed that participating in a Mindful Eating program led to fewer binges and reduced depression.  Find Rewarding Activities Unrelated to Food  Taking a hike,  reading a book, going to a yoga class, getting a massage, patting your dog, or making time for friends and family can help to relieve stress without adding on the pounds. Although you may feel that you don’t have time for leisure activities with looming deadlines, taking time to relieve stress helps you to feel refreshed, lets you think more clearly, and improves your mood, so you are less likely to overeat.  Write in a Journal  Writing down your experiences and reactions or your most important goals keeps your hands busy and your mind occupied, so you’re less likely to snack on unhealthy foods. Writing can give you insight into why you’re feeling so stressed and highlight ways of thinking or expectations of yourself that may be increasing the pressure you feel. Writing down your healthy eating and exercise goals may make you more conscious of your desire to live a healthier lifestyle and intensify your commitment. Research studies have also shown that writing expressively or about life goals can improve both mood and health.    Source: Psychology Today  Date: Posted Aug 28, 2013   Author: Elvira Duran, Ph.D

## 2024-08-14 NOTE — PROGRESS NOTES
HISTORY OF PRESENT ILLNESS  Chief Complaint   Patient presents with    Weight Problem     Recommendation from pcp, on metformin for pre diabatic, open to meds.        Lorelei Clemente is a 48 year old female new to our office today for initiation of medical weight loss program, referred by PCP.  Patient presents today with c/o excess weight started after  4 pregnancies. Lowest adult weight 130#. Highest weight at 193#..    Reason/goal for weight loss: lose 10-15# in 6 months, 30-45# in 1 year    Previous weight loss efforts in the past: none    Past 6 months lifestyle interventions: yes, regular exercise    Reviewed [facility] patient contract. Readiness for Lifestyle change: 10/10, Interest in Medication: 10/10, Bariatric surgery interest: 0/10.    Barriers to weight loss: emotional eating    Wt Readings from Last 6 Encounters:   08/14/24 191 lb (86.6 kg)   08/05/24 194 lb (88 kg)   07/17/24 194 lb 9.6 oz (88.3 kg)   07/15/24 190 lb (86.2 kg)   06/14/24 190 lb (86.2 kg)   06/12/24 190 lb (86.2 kg)          Social hx and lifestyle reviewed:    How many meals do you eat out per week: rare  Who is the primary cook in your home: patient    Please respond to the questions regarding your previous weight loss    How did you hear about the [facility]? Primary Care Provider   Previous weight loss efforts in the past/medication(s): Metformin   Eating behaviors/patterns that have been barriers to weight loss success in the past: Schedule   Please respond to the questions regarding a 24 hour food journal.  Include the average time you ate and the quantity/food preparation method.    List foods, qty and prep for breakfast: Cup of cook oatmeal with a banana, honey walnuts. Coffee, tea, small pastry   List foods, qty and prep for lunch. [place], ham, American cheese, mayonnaise, ketchup   List foods, qty and prep for dinner. 2 cups of mexican stews, can be chicken, pork or beef. Water or juce, 3 corn tortillas or   2 dinner rolls   List foods, qty and prep for snacks. 1 Apples, 3 pineapples sliced, bag of chips with hot sauce, couple chocolates, one orange   List the types and qty of fluids consumed 1 cup of coffee, 1 cup of tea, 1 cup of juce, 40 oz of water   Please respond to the questions regarding lifestyle.    Tobacco use: No   Alcohol use: How many servings per week? 0   Supplements taken on a regular basis include: 0   Please respond to the questions regarding exercise/activity    How many days per week are you active or exercise 4   What type of activities: Walking   Perceived level of exertion on a scale of 1-5, with 5 being very intense: 2   Average stress level on a scale of 1-10, with 10 being extremely stressed: 6   How do you cope with stress: Food   Please respond to the questions regarding sleep    How many hours of uninterrupted sleep do you get a night: 7   How many times do you wake up in the night: 2   Do you feel rested in the morning: Yes   Do you snore: Yes   Do you have sleep apnea: Yes   Do you use: CPAP     Work: [employer]  Marital status:  with 4 children  Support: yes    MEDICAL HISTORY  PMH reviewed:   Cardiac disorders: hypertriglyceridemia   Depression/anxiety: depression and anxiety  Glaucoma: negative  Kidney stones: negative  Eating disorder: negative  Migraines: yes  Seizures: negative  Joint-related conditions: negative  Liver disease: negative  Renal disease: negative  Diabetes: prediabetes  Thyroid disease: hypothyroidism  Constipation: negative  Other pertinent hx: IBS- D/C  Sleep Apnea hx: BENITA using CPAP  Cancer hx: negative  Cholecystectomy and/or gallstones: cholecystectomy  Family or personal history of Pancreatic issues / Medullary Thyroid Cancer/MENS 2: negative  History of bariatric surgery: negative    FMH reviewed: obesity in parent/s or sibling: yes    REVIEW OF SYSTEMS  GENERAL: feels well otherwise  SKIN: denies any rashes to skin folds  HEENT: snoring-  yes  LUNGS: denies shortness of breath with exertion  CARDIOVASCULAR: denies chest pain on exertion, denies palpitations or pedal edema  GI: denies abdominal pain.  No N/V/D/C  MUSCULOSKELETAL: denies joint pains  NEURO: denies headaches  ENDOCRINE: denies any excess hunger, urination or thirst, denies any purple striae  PSYCH: denies change in behavior or mood, denies feeling sad or depressed.    EXAM  /76   Pulse 63   Resp 16   Ht 5' 4.5\" (1.638 m)   Wt 191 lb (86.6 kg)   LMP 05/28/2012 (LMP Unknown)   BMI 32.28 kg/m² ,   Percent body fat: F >32%: 38.7%. VF: 10. Muscle Mass: 15.6%.  GENERAL: well developed, well nourished, in no apparent distress, obese  SKIN: warm, pink, dry without rashes to exposed area  EYES: conjunctiva pink, sclera non icteric, PERRLA  HEENT: atraumatic, normocephalic  NECK: supple, non tender, no adenopathy, no thyromegaly  LUNGS: CTA in all fields, breathing non labored  CARDIO: RRR without murmur, normal S1 and S2 without clicks or gallops, no pedal edema. Reviewed EKG in EMR dated 2/22/24, WNL  GI: +BS, soft, no masses, HSM or tenderness  MUSCULOSKELETAL: grossly intact  NEURO: Oriented times three  PSYCH: pleasant, cooperative, normal mood and affect    Lab Results   Component Value Date    WBC 7.4 07/18/2023    RBC 4.78 07/18/2023    HGB 13.1 07/18/2023    HCT 40.8 07/18/2023    MCV 85.4 07/18/2023    MCH 27.4 07/18/2023    MCHC 32.1 07/18/2023    RDW 12.6 07/18/2023    .0 07/18/2023    MPV 8.5 05/09/2011     Lab Results   Component Value Date     (H) 07/09/2024    BUN 10 07/09/2024    BUNCREA 20.8 (H) 04/03/2021    CREATSERUM 0.63 07/09/2024    ANIONGAP 8 07/09/2024    GFRNAA 89 06/29/2022    GFRAA 102 06/29/2022    CA 9.6 07/09/2024    OSMOCALC 287 07/09/2024    ALKPHO 69 07/09/2024    AST 20 07/09/2024    ALT 36 07/09/2024    BILT 0.4 07/09/2024    TP 7.7 07/09/2024    ALB 3.8 07/09/2024    GLOBULIN 3.9 07/09/2024     07/09/2024    K 5.0 07/09/2024      07/09/2024    CO2 25.0 07/09/2024     Lab Results   Component Value Date     (H) 07/09/2024    A1C 6.2 (H) 07/09/2024     Lab Results   Component Value Date    CHOLEST 220 (H) 07/09/2024    TRIG 421 (H) 07/09/2024    HDL 48 07/09/2024     (H) 07/09/2024    VLDL 72 (H) 07/09/2024    NONHDLC 172 (H) 07/09/2024     Lab Results   Component Value Date    T4F 1.0 07/09/2024    TSH 2.320 07/09/2024     No results found for: \"B12\", \"VITB12\"  No results found for: \"VITD\", \"QVITD\", \"NLIT82KN\"    Current Outpatient Medications on File Prior to Visit   Medication Sig Dispense Refill    ALPRAZolam (XANAX) 0.25 MG Oral Tab Take 1 tablet (0.25 mg total) by mouth nightly as needed. 10 tablet 0    diclofenac (VOLTAREN) 1 % External Gel Apply 4 g topically every 6 (six) hours as needed. 1 each 2    buPROPion  MG Oral Tablet 24 Hr Take 1 tablet (300 mg total) by mouth daily. 90 tablet 3    metFORMIN  MG Oral Tablet 24 Hr Take 2 tablets (1,000 mg total) by mouth daily with breakfast. 180 tablet 1    levothyroxine 75 MCG Oral Tab Take 1 tablet (75 mcg total) by mouth before breakfast. 90 tablet 1    ondansetron (ZOFRAN) 4 mg tablet Take 1 tablet (4 mg total) by mouth every 8 (eight) hours as needed for Nausea. 30 tablet 1    SUMAtriptan 25 MG Oral Tab TAKE 1 TABLET BY MOUTH AS NEEDED FOR MIGRAINE. MAY REPEAT IN 2 HOURS IF NEEDED(MAX 2 TABLETS PER DAY) AS DIRECTED 9 tablet 1    naproxen 500 MG Oral Tab Take 1 tablet (500 mg total) by mouth 2 (two) times daily as needed. 60 tablet 1     No current facility-administered medications on file prior to visit.       ASSESSMENT  Initial Weight Data and Goal Weight Loss:  Weight Calculations  Initial Weight: 191.6 lbs  Initial Weight Date: 08/14/24  5% Goal: 9.58  10% Goal: 19.16    Diagnoses and all orders for this visit:    Encounter for therapeutic drug monitoring  -     Vitamin D; Future  -     Vitamin B12; Future  -     Phentermine HCl 15 MG Oral Cap; Take 1  capsule (15 mg total) by mouth every morning.  -     topiramate 25 MG Oral Tab; Take 1 tablet (25 mg total) by mouth 2 (two) times daily. Start 1 tab daily for 7 days, then can increase to twice a day as prescribed    Class 1 obesity with serious comorbidity and body mass index (BMI) of 31.0 to 31.9 in adult, unspecified obesity type  - Start phentermine and Topamax as directed. Patient to check in to coverage for AOMs.  - Reviewed balanced plate nutrition with focus on whole food, regular meals daily that include protein and produce and eliminating/reducing late night eating.  - Counseled on the 4 Pillars of health (sleep, stress, nutrition and fitness).  - Reviewed weight synopsis in EMR  -     Vitamin D; Future  -     Vitamin B12; Future  -     OP REFERRAL TO DIETITIAN [facility] ([facility] USE ONLY)  -     Phentermine HCl 15 MG Oral Cap; Take 1 capsule (15 mg total) by mouth every morning.  -     topiramate 25 MG Oral Tab; Take 1 tablet (25 mg total) by mouth 2 (two) times daily. Start 1 tab daily for 7 days, then can increase to twice a day as prescribed    Prediabetes  - continue Metformin ER  -     Vitamin D; Future  -     Vitamin B12; Future  -     OP REFERRAL TO DIETITIAN EM[facility] ([facility] USE ONLY)    BENITA on CPAP  -     Vitamin D; Future  -     Vitamin B12; Future  -     OP REFERRAL TO DIETITIAN EMG [facility] ([facility] USE ONLY)    Hypertriglyceridemia  -     Vitamin D; Future  -     Vitamin B12; Future  -     OP REFERRAL TO DIETITIAN EM[facility] ([facility] USE ONLY)    Migraine with aura and without status migrainosus, not intractable  -     Vitamin D; Future  -     Vitamin B12; Future  -     OP REFERRAL TO DIETITIAN EMG [facility] ([facility] USE ONLY)  -     topiramate 25 MG Oral Tab; Take 1 tablet (25 mg total) by mouth 2 (two) times daily. Start 1 tab daily for 7 days, then can increase to twice a day as prescribed    Hypothyroidism, unspecified type  -     Vitamin D; Future  -     Vitamin B12; Future  -     OP REFERRAL TO DIETITIAN EM[facility] ([facility] USE  ONLY)    Irritable bowel syndrome with both constipation and diarrhea        PLAN  Medication use and side effects reviewed with patient.  Medication contraindications: Contrave with concurrent Wellbutrin XL  Follow up with dietitian and psychologist as recommended.  Discussed the role of sleep and stress in weight management.  Labs orders as above.  Counseled on comprehensive weight loss plan including attention to nutrition, exercise and behavior/stress management for success. See patient instruction below for more details.  Reviewed previous labs in EMR/Care Everywhere  Weight Loss Contract reviewed and signed.    Patient Instructions   Welcome to the [facility]...your Lifestyle Renovation begins now!  Thank you for placing your trust in our health care team, I look forward to working with you along this journey to better health!    Next steps:     1.  Call our office at [phone] to schedule a personal nutrition consultation with one of our registered dieticians, Jose Krause. Bring along your food journal (3 days minimum). See journal options below.  2.  Complete fasting (10-12 hours, water only) labs at [facility] lab site prior to next office visit. Lab results will be communicated via [employer].  3.  Body composition completed today with findings of: Total body fat: 38.7% (goal <32%), Visceral Fat: 10 (goal <10) and Muscle mass: 15.6% (goal >18%).  4.   Fill your prescribed medication and take as discussed and prescribed: Start generic alternative to Qsymia (www.qsymia.com) with Phentermine and Topamax as directed. Option to increase Topamax after 1 month if tolerated well and <5# weight loss. Send [employer] message with status and current home scale weight for this adjustment if needed. Look into coverage for anti-obesity medication, such as Wegovy or Zepbound.  5.  Start VSL#3 probiotic as listed below for gut health and to reduce IBS symptoms.      Please try to work on  the following dietary changes this first month:    1.  Drink water with meals and throughout the day, cut down on soda and/or juice if consumed. Consider flavored water options like Bubbly, Spindrift, Hint and Arben.  2.  Have protein with each meal, examples include: greek yogurt, cottage cheese, hard boiled egg, tofu, chicken, fish, or tuna. Recommended daily protein intake is 85 grams/day. Optimal protein intake is 105 grams/day.  3.  Work towards reducing/eliminating refined carbohydrates and sugars which includes items such as sweets, as well as rice, pasta, and bread and make sure to choose whole grain options when having them with just 1 serving per meal about the size of your inner palm.  4.  Consume non starchy veggies daily working towards making them a good 50% of your daily food intake. Add them to lunch and dinner consistently.  5.  Start a daily probiotic: VSL#3 is recommended, (order on line at www.vsl3.com). Take 1 capsule daily with water for 30 days, then reduce to 1 every other day (this will reduce the cost). Capsules can be left out of refrigerator for 2 weeks. I recommend using a pill box weekly and keeping the bottle in the fridge.    Please download michell My Fitness Pal, LoseIt! Or Net Diary to monitor daily dietary intake and you will be able to see if you are eating the right amount of calories or too much or too little which would hinder weight loss. Additionally this will help to see your daily carbohydrate and protein intake. When you set the michell up choose 1-1.5 lbs/week as a goal.  Keeping a paper food journal is an option as well to remain accountable for your choices- this is the start to mindful eating! A low calorie diet has been consistently shown to support weight loss.    Continue or start exercising to help establish a routine. If not already exercising begin with 1 day/week and progress as able with the goal of working towards 30 minutes 5 days a week at a minimum. A variety or  cardio, strength and stretching is important. Review resources below to help support you in building this healthy routine.    Meditation daily can help manage and control stress. Chronic stress can make weight loss difficult.  Exercising is one way to help with stress, but meditation using the CALM Zane or another comparable alternative can be done in your home or place of work with little time commitment. This Zane can also help work on behavior change and improve sleep. Check out the segment under Calm Masterclass and listen to The 4 Pillars of Health. A great way to begin learning about the foundation of lifestyle with practical tips to use in your every day. In addition, we offer counseling services and support for individual connection and care. A referral is necessary so please let me know if this is a service you are interested.    Check out www.yourweightmatters.org blog for continued support and education along your weight loss journey to optimal health!      Patient Resources:    Personal Training/Fitness Classes/Health Coaching    [facility] in [place]: Full fitness center with group fitness and personal training located in [place].  Health Coaching with Nunu Hernandez, Stanley Reynolds, and Andrew Daly at our [facility]- individual coaching to work on your health goals. Call [phone] and/or email @ [email or web address]. Free 60 minute consult when client of [employer] [facility].  MILEY Tong @ http://www.[email or web address]. A variety of group fitness options plus various yoga classes [phone] and/or email Alexandra at [email or web address]  [facility] Fitness Centers with multiple locations: [employer] Fitness (www.Embrella Cardiovascular.Jelly HQ), F45 Training (www.p85lwmbhrtt.Jelly HQ), Fit Body Bootcamp (www.Super Heat GamesbodyMyActivityPalotW4p.Jelly HQ), Progressive Dealer Tools Fitness (www.[employer].Jelly HQ), The Exercise  (www.exercisecoach.com), Club Pilates  (www.clubBuz.Zipline Games)    Online Fitness  Fitness  on Utube  Fit in 10 DVD series   www.ibkfj71KGB.Zipline Games  Chair exercises via Sit and Be Fit (www.sitandbefit.org) and RetailMLS (www.Intraxio.com) or Harry Islas or Jared Ramos videos on YouTube.  Hip Hop Fit with Dinesh Douglas at www.hiphopfit.net    Apps for on the Go Fitness  [place] 7 Minute Workout (orange box with white 7) - free on the go HIIT training zane  Peloton Zane @ www.onepeloton.com    Nutrition Trackers and Programs  LoseIT! And My Fitness Pal apps and on line for tracking nutrition  NOOM - virtual health coaching  FitFoundation (healthy meals on the go) in [place] @ wwwLizticyxmkxtipcuktu7oTinitell  Bistro MD @ Prospex MedicalbistrSapiensdTinitell and Xaptmw70 (calorie smart and low carb plans recommended) @ www.zaamtg97.com, Metabolic Meals @ wwwOktogoMetabolicMealsTinitell - individual prepared meals to go  Gobble, Blue Apron, Home , Every Plate, Sunbasket- on line meal delivery programs for preparation at home  Meal Village in [place] for homemade meals to go @ www.meal7Summits.Zipline Games  Diet Doctor @ www.dietdoctor.com - low carb swaps  YummBookingabus.com - meal prep and planning zane (www.yummly.com)    Stress, Anxiety, Depression, Trauma  CALM meditation zane (www.calm.com)  Headspace  Don't let anxiety run your life. Using the science of emotion regulation and mindfulness to overcome fear and worry by Kenny Claire PsyD and Chris Ellington MA.  The Authentium Podcast (September 27, 2023): 6 Magic Words That Stop Anxiety  What Happened to You?- a look at the impact trauma has on behavior written by Bin Myers and Dr. Dipak Cain  Whole Again by Rajinder Nunes - discovering your true self after trauma    Mindful Eating/The Hungry Brain  Am I Hungry? Mindful eating virtual  zane (www.amihungry.com)  The Hungry Brain by Alyce Martinez, PhD  Mindless Eating by Jani Mckeon  Weight Loss Surgery Will Not Treat Food Addiction by Angy Gaston Ph.D    Metabolic  Dysfunction, Hormones and Cravings  Why We Get Sick? By Remy Colin (insulin resistance)  Your Body in Balance: The New Science of Food, Hormones, and Health by Dr. Maverick Villalba  The Complete Guide to fasting by Dr. Bailey  Fast Like a Girl by Dr. Leighann Allen  The Menopause Reset by Dr. Leighann Allen  Sugar, Salt & Fat by Raegan Hanna, Ph.D, R.D.  The Truth About Sugar - documentary on sugar (Free on Utube, https://youtu.be/6M3mnifTX2q)  Reverse Visceral Fat: #1 Way to Increase Your Lifespan & End Inflammation with Dr. Ta Reynolds on Utube @ https://youGolimiu.be/nupPRnvUpJY?si=lb1kfsWfQSQ0RwzF    Nutrition Support  You Are What You Eat - Netfix series on twin study looking at impact of nutrition changes on health  The End of Dieting: How to Live for Life by Dr. Tito Buchanan M.D. or listen to The lovemeshare.me Podcast Episode 63: Understanding \"Nutritarian\" Eating w/Dr. Tito Buchanan  The Game Changers- Netflix Documentary on plant based nutrition  The Dr. Barfield  Wellness Plan by Dr. Donato Barfield MD  The Complete Guide to fasting by Dr. Bailey  @[email or web address] ([facility] Dietician with support surrounding nutrition and meal prep/planning)    Education, Motivation and Support Resources  Live to 100: Secrets of the Blue Zones - Netflix series on the secrets to communities living over 100 years old  Atomic Habits by Román Sebastian (a book about taking small steps to promote greater behavior change)   Motivation michell (black box with white \")- daily supportive messages sent to your phone  Can't Hurt Me by Kenny Webb (a book exploring the power of discipline in achieving your goals)  Fed Up - documentary about obesity (Free on Utube)  Www.yourweightmatters.org - Obesity Action Coalition sponsored Blog posts  Obesity Action Coalition Resources on topics specific to weight management (www.obesityaction.org)  Fitlosophy Fitspiration - journal to better health (journal book found at [employer] in fitness aisle)  Mini Mireles talk titled:  The Call to Courage (Netflix)  The Exam Room by the Physician's Committee (Podcast)  Nutrition Facts by Dr. Flores (Podcast)    Balanced Nutrition includes:     Build the mentality of Food 4 Fuel. Clean eating with whole foods and eliminating/reducing ultra processed foods.  Be an intuitive eater and using mindful eating practices.  Eat a balanced plate with protein and produce at all meals: 1/4 plate- protein, 1/2 plate non starchy veggies, and 1/4 plate fruit or complex carbohydrate.  Drink water with all meals and use a salad plate to naturally reduce portions.  Eliminate/reduce late night eating by stopping after 7pm. Allowing your body to fast for 12 hours (drink only water, tea or black coffee without any additives).            Why We Gain Weight When We’re Stressed--And How Not To  The psychology and biology of stress-related overeating and weight gain   Emotional Eating under Stress  Have you ever found yourself mindlessly eating a tub of ice cream while you brood about your latest romantic rejection or eating a hamburger and fries in front of your computer as you furiously try to make a work deadline? Perhaps you’re a busy mom, eating cookies in your car as you shuttle the kids back and forth to a slew of activities. Or you’re a small business owner desperately trying to make ends meet when you suddenly realize your waistline has expanded. If you recognize yourself in any of these scenarios, you’re not alone and it’s probably not your fault. Stress that goes on for a long period is a triple whammy for weight--it increases our appetites, makes us hold onto the fat, and interferes with our willpower to implement a healthy lifestyle.  Below are the four major reasons stress leads to weight gain and four great research-based coping strategies you can use to fight back.  Hormones  When your brain detects the presence of a threat, no matter if it is a snake in the grass, a grumpy boss, or a big credit card bill,  it triggers the release of a cascade of chemicals, including adrenaline, CRH, and cortisol. Your brain and body prepare to handle the threat by making you feel alert, ready for action and able to withstand an injury. In the short-term, adrenaline helps you feel less hungry as your blood flows away from the internal organs and to your large muscles to prepare for “fight or flight.” However, once the effects of adrenaline wear off, cortisol, known as the “stress hormone,” hangs around and starts signaling the body to replenish your food supply. Fighting off wild animals, like our ancestors did, used up a lot of energy, so their bodies needed more stores of fat and glucose. Today’s human, who sits on the couch worrying about how to pay the bill or works long hours at the computer to make the deadline, does not work off much energy at all dealing with the stressor! Unfortunately, we are stuck with a neuroendocrine system that didn’t get the update, so your brain is still going to tell you to reach for that plate of cookies anyway.  Belly Fat  In the days when our ancestors were fighting off tigers and famine, their bodies adapted by learning to store fat supplies for the long haul. The unfortunate result for you and me is that when we are chronically stressed by life crises and work-life demands, we are prone to getting an extra layer of “visceral fat” deep in our bellies. Your belly has an ample supply of blood vessels and cortisol receptors to make the whole process flow more efficiently. The downside is that excess belly fat is unhealthy and difficult to get rid of. The fat releases chemicals triggering inflammation, which increases the likelihood that we will develop heart disease or diabetes. And it can make it more difficult to fit into those rebekah jeans you splurged on, leading to more stress about money wasted! Unfortunately, excess cortisol also slows down your metabolism, because your body wants to maintain  an adequate supply of glucose for all that hard mental and physical work dealing with the threat.  Anxiety  When we have a surge of adrenaline as part of our fight/flight response, we get fidgety and activated. Adrenaline is the reason for the “wired up” feeling we get when we’re stressed. While we may burn off some extra calories fidgeting or running around cleaning because we can’t sit still, anxiety can also trigger “emotional eating.” Overeating or eating unhealthy foods in response to stress or as a way to calm down is a very common response. In the most recent American Psychological Association’s “Stress in [place]:” survey, a whopping 40% of respondents reported dealing with stress in this way, while 42% reported watching television for more than 2 hours a day to deal with stress. Being a couch potato also increases the temptation to overeat and is inactive, which means that those extra calories aren’t getting burned off. Anxiety can also make you eat more “mindlessly” as you churn around worrying thoughts in your head, not even focusing on the taste of the food, how much you’ve eaten, or when you are feeling full. When you eat mindlessly, you will likely eat more, yet feel less satisfied.  Cravings and Fast Food  When we are chronically stressed, we crave “comfort foods,” such as a bag of potato chips or a tub of ice cream. These foods tend to be easy to eat, highly processed, and high in fat, sugar, or salt. We crave these foods for both biological and psychological reasons. Stress may mess up our brain’s reward system or cortisol may cause us to crave more fat and sugar. We also may have memories from childhood, such as the smell of freshly baked cookies,, that lead us to associate sweet foods with comfort. When we are stressed, we also may be more likely to drive through the Fast Food place, rather than taking the time and mental energy to plan and cook a meal. Americans are less likely to cook and eat  dinner at home than people from many other countries, and they also work more hours. Working in urban areas may mean long, jammed commutes, which both increase stress and interfere with willpower because we are hungrier when we get home later. A [facility] research study showed, in laboratory mice, that being “stressed” by exposure to the smell of a predator lead the mice to eat more high-fat food pellets, when given the choice of eating these instead of normal feed.  Less Sleep  Do you ever lie awake at night worrying about paying the bills or about who will watch your kids when you have to go to work? According to the APA’s “Stress in [place]” survey, more than 40% of us lie awake at night as a result of stress. Research shows that worry is a major cause of insomnia. Our minds are overactive and won’t switch off. We may also lose sleep because of pulling overnights to cram for exams or writing until the early hours. Stress causes decreased blood sugar, which leads to fatigue. If you drink coffee or caffeinated soft drinks to stay awake, or alcohol to feel better, your sleep cycle will be even more disrupted. Sleep is also a powerful factor influencing weight gain or loss. Lack of sleep may disrupt the functioning of ghrelin and leptin--chemicals that control appetite. We also crave carbs when we are tired or grumpy from lack of sleep. Finally, not getting our devonte zzzz’s erodes our willpower and ability to resist temptation. In one study, overweight/obese dieters were asked to follow a fixed calorie diet and assigned to get either 5 and a half or eight and a half hours of sleep a night (in a sleep lab). Those with sleep deprivation lost substantially less weight.  How to Minimize Weight Gain When You’re Stressed  Exercise  Aerobic exercise has a one-two punch. It can decrease cortisol and trigger release of chemicals that relieve pain and improve mood. It can also help speed your metabolism so  you burn off the extra indulgences  Eat Mindfully  Mindful Eating programs train you in meditation, which helps you cope with stress, and change your consciousness around eating. You learn to slow down and tune in to your sensory experience of the food, including its sight, texture or smell. You also learn to tune into your subjective feelings of hunger or fullness, rather than eating just because it’s a mealtime or because there is food in front of you. A well-designed study of binge-eaters showed that participating in a Mindful Eating program led to fewer binges and reduced depression.  Find Rewarding Activities Unrelated to Food  Taking a hike, reading a book, going to a yoga class, getting a massage, patting your dog, or making time for friends and family can help to relieve stress without adding on the pounds. Although you may feel that you don’t have time for leisure activities with looming deadlines, taking time to relieve stress helps you to feel refreshed, lets you think more clearly, and improves your mood, so you are less likely to overeat.  Write in a Journal  Writing down your experiences and reactions or your most important goals keeps your hands busy and your mind occupied, so you’re less likely to snack on unhealthy foods. Writing can give you insight into why you’re feeling so stressed and highlight ways of thinking or expectations of yourself that may be increasing the pressure you feel. Writing down your healthy eating and exercise goals may make you more conscious of your desire to live a healthier lifestyle and intensify your commitment. Research studies have also shown that writing expressively or about life goals can improve both mood and health.    Source: Psychology Today  Date: Posted Aug 28, 2013   Author: Elvira Duran, Ph.D      Return in about 4 months (around 12/14/2024) for weight management via clinic or VV.    Patient verbalizes understanding.    Gisela Bryant,  MELITON  8/14/2024    DOCUMENTATION OF TIME SPENT: Code selection for this visit was based on time spent : 50 minutes on date of service in preparing to see the patient, obtaining and/or reviewing separately obtained history, performing a medically appropriate examination, counseling and educating the patient/family/caregiver, ordering medications or testing, referring and communicating with other healthcare providers, documenting clinical information in the electronic medical record, independently interpreting results and communicating results to the patient/family/caregiver and care coordination with the patient's other providers.

## 2024-08-21 NOTE — PROGRESS NOTES
PHYSICAL THERAPY INITIAL EVALUATION     Date of service: 8/22/2024  Dx: Plantar fasciitis, right (M72.2)        Insurance: BCBS PPO  Insurance Limits: 70 visit limit  Visit #: 1  Authorized # of Visits: 8 recommended  POC/Auth Expiration: N/A  Authorizing Physician/Provider: Carlos Eduardo     PATIENT SUMMARY     History/ANDREW: \"About a year ago, I started having a pain in my leg. Then one day I was about to take my pajamas off and I felt sharp pain in my back. When I got up, I felt pain in the bottom of my foot. I went to the chiropractor and it didn't really help. I went to do the podiatrist and he gave me a shot and gave me an orthotic that I wear all the time.     I feel like the orthotics are helping, but if I stay up for too long, it starts aching and hurting.     HEP: plantar fascia stretch against the wall and toe extension stretch.     DOI/S: chronic, insidious onset    Aggravating Factors: sitting for too long > 35 minutes, aching first thing in the morning, stiffness and pain in the bottom of the foot, walking or running for too long.     Alleviating Factors: orthotics, sandals with cushion used first thing in the morning     PMH: The patient's PMH was reviewed with the patient including allergies, medications, and surgical and medical history. 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 The patient denies t    PLF/Personal Goals: running (up to 1 mile)     Occupation: library, deskwork and computer work     OBJECTIVE:     Pain/Symptom Presentation: Patient reports a pinching pain.  Patient report pain at the right heel.   Pain at rest: 2/10  Pain at worst: 7/10    Outcomes Measure(s):   LEFS: 88.75% function    Activity Measures:  Standing/Walking After Prolonged Sitting:  Moderate Activity Limitation: Patient is with stiffness that take 15-20 minutes to subside.   Standing: Mild Activity Limitation: Patient is able to stand up to 30-45 minutes before disruption due to foot/ankle pain.   Walking: Moderate Activity Limitation: Patient is able to walk up to 0.5 miles before disruption due to foot/ankle pain.   Driving: No Activity Limitation: Patient is able to drive without limitations.   Ascending Stairs: No Activity Limitation: Patient navigates up the stairs with reciprocal gait pattern, no deviations.  Descending Stairs: No Activity Limitation: Patient navigates down the stairs with reciprocal gait pattern, no deviations.   Stairs: No Activity Limitation: Patient is able to navigate 5 flights of stairs at a time.   Running Short Distances: Extreme Activity Limitation: Patient is unable to run.     Inspection/Observation: Patient demonstrates no apparent bruising, swelling, or deformity.   Palpation: Patient demonstrates TTP along the plantar fascia.   Sensation: Patient is with intact sensation.     ROM:  Ankle Motion PROM AROM    Right Left Right Left   Ankle OKC DF (knee straight) N/A N/A 10 10   Ankle OKC PF N/A N/A 65 65   Great Toe Ext 70 70 N/A N/A   Ankle CKC DF (knee bent) N/A N/A N/A N/A   *indicates activity was associated with pain     Strength/MMT:   Ankle Motion Strength    Right Left   Ankle OKC DF 5/5 5/5   Ankle OKC PF 5/5 5/5   Ankle OKC INV/forefoot ADD 5/5 5/5   Ankle OKC EV/forefoot ABD 5/5 5/5   *indicates activity was associated with pain     ASSESSMENT:     ANDREWS is a 48 year old female that presents to physical therapy evaluation with signs and symptoms consistent with plantar fasciitis. The patient has seen a podiatrist and has had both an injection and is using orthotics, which have helped her pain. The patient demonstrates good ankle mobility and strength, but is with TTP along the plantar fascia. Current functional limitations include, but are not  limited to, prolonged standing, walking long distances, AM stiffness, and standing after prolonged sitting. Lorelei would like to resume running long distances for general health and wellness. At this time, she cannot run due to foot pain and has seen a resultant increase in body weight. The patient would benefit from physical therapy to facilitate improved pain and symptom management, soft tissue extensibility, and foot and ankle intrinsic strength and stability for improved tolerance to ADL's and return to PLF.     Precautions/WB Status: WBAT  Education or Treatment Limitation(s): None  Rehab Potential: Good    TREATMENT:     Initial Evaluation: x 20min     Therapeutic Activities: x 3min  Patient Education: Patient was educated on anatomy and pathophysiology of current condition, rationale for physical therapy, anticipated treatment interventions, prognosis, timeline for recovery, and expected functional outcomes based on evaluative findings.     Therapeutic Exercise: x 12min  Manual toe ext/DF stretch: 2 x 30\"   Manual toe flex/PF stretch: 2 x 30' (R)   4-way ankle:  Calf stretch against wall:   Administered HEP: Reviewed HEP handout, exercise selection, and recommended resistance. Provided verbal and written instructions/cueing for proper technique and common errors/compensations as needed.   Patient Education: Patient was educated on the importance of compliance with consistent treatment and HEP to achieve mutually established goals. Patient verbalized understanding.     Manual Therapy: x 10min  Soft tissue mobilization: (R) plantar fascia     Home Exercise Program:   Access Code: [email or web address]  URL: [email or web address]  Date: 08/22/2024  Prepared by: Caroline Luciano    Exercises  - Seated Plantar Fascia Mobilization with Small Ball  - 1 x daily - 7 x weekly - 1 sets - 2-3' hold  - Seated Plantar Fascia Stretch  - 1 x daily - 7 x weekly - 1 sets - 30\" hold  - Ankle Dorsiflexion with Resistance   - 1 x daily - 7 x weekly - 2 sets - 10 reps  - Ankle Eversion with Resistance  - 1 x daily - 7 x weekly - 2 sets - 10 reps  - Ankle Inversion with Resistance  - 1 x daily - 7 x weekly - 2 sets - 10 reps  - Ankle and Toe Plantarflexion with Resistance  - 1 x daily - 7 x weekly - 2 sets - 10 reps  - Gastroc Stretch on Wall  - 1 x daily - 7 x weekly - 1 sets - 30\" hold  - Toe Spreading  - 1 x daily - 7 x weekly - 2 sets - 10 reps    Provider Interactions With Patient:   All patient's questions were answered and the patient denied further comments, complaints, or concerns upon departure.   Patient was issued an appt list and verbally confirmed the next appt date and time to ensure consistency with physical therapy attendance.     Charges: PT Eval Low Complexity Complexity x 1, MT x 1, Therex x 1   Total Timed Treatment: 20min       Total Treatment Time: 40min     PLAN OF CARE:      Goals:  Short-Term Goals:  Patient will improve ankle mobility, strength, and endurance to facilitate ability to stand for 3-4 hour(s) daily for community integration. Timeframe: 4-6 visits.  Long-Term Goals:  Patient will improve ankle mobility, strength, and endurance to facilitate walking distances of 2-3 mile(s) daily for community integration. Timeframe: 6-8 visits.  Patient will improve foot mobility and plantar fascia extensibility to facilitate pain-free walking after stepping out of bed in the morning, without complaints of stiffness. Timeframe: 8-12 visits.  Patient will improve LEFS score by at least 9 points to indicate a true change in improved function for ADL's and restoring PLF. Timeframe: 8-12 visits.    Plan Frequency / Duration: Patient will be seen for 2x/week for 4 weeks, for a total of 8 visits, over a 90 day period. We will re-evaluate the patient at that time in order to determine functional progress, evaluate short-term goal completion, and establish an updated plan of care. Possible treatment interventions will/may  include: Therapeutic Activities, Therapeutic Exercise, Neuromuscular Re-education, Manual Therapy, Home Exercise Program Instruction, Patient Education, Self-Care/Home Management, Gait Training, and Modalities as needed.     Patient/Family was advised of these findings, precautions, and treatment options and has agreed to actively participate in planning and for this course of care.    Thank you for your referral. Please co-sign or sign and return this letter via fax as soon as possible to [phone]. If you have any questions, please contact me at Dept: [phone].    Sincerely,    X___Caroline Luciano PT, DPT, SCS, ATC, CSCS____ Date: __8/22/2024________    Electronically signed by therapist: Caroline Townsend PT  Physician's certification required: Yes  I certify the need for these services furnished under this plan of treatment and while under my care.

## 2024-08-22 ENCOUNTER — OFFICE VISIT (OUTPATIENT)
Dept: PHYSICAL THERAPY | Age: 49
End: 2024-08-22
Attending: INTERNAL MEDICINE
Payer: COMMERCIAL

## 2024-08-22 DIAGNOSIS — M72.2 PLANTAR FASCIITIS, RIGHT: Primary | ICD-10-CM

## 2024-08-22 PROCEDURE — 97110 THERAPEUTIC EXERCISES: CPT

## 2024-08-22 PROCEDURE — 97140 MANUAL THERAPY 1/> REGIONS: CPT

## 2024-08-22 PROCEDURE — 97161 PT EVAL LOW COMPLEX 20 MIN: CPT

## 2024-08-27 ENCOUNTER — OFFICE VISIT (OUTPATIENT)
Dept: PHYSICAL THERAPY | Age: 49
End: 2024-08-27
Attending: INTERNAL MEDICINE
Payer: COMMERCIAL

## 2024-08-27 PROCEDURE — 97140 MANUAL THERAPY 1/> REGIONS: CPT

## 2024-08-27 PROCEDURE — 97110 THERAPEUTIC EXERCISES: CPT

## 2024-08-27 PROCEDURE — 97112 NEUROMUSCULAR REEDUCATION: CPT

## 2024-08-27 NOTE — PROGRESS NOTES
Date of Service: 8/27/2024  Dx: Plantar fasciitis, right (M72.2)            Insurance: BCBS PPO  Insurance Limits: 70 visit limit  Visit #: 1  Authorized # of Visits: 8 recommended  POC/Auth Expiration: N/A  Date of Last PN: 8/22/24 (Visit #1)  Authorizing Physician/Provider: Calros Eduardo Keenan MD visit: N/A  Fall Risk: Standard         Precautions: None            Subjective: The patient reports that she felt better after her last appt. She reports compliance with her HEP. She does feel like they are helping. She's been stretching in the morning and it's making a difference.     Objective:     Pain/Symptom Presentation:    Resting pain pre-tx: 1/10  Pain at worst: 3/10    HEP:   Access Code: [email or web address]  URL: [email or web address]  Date: 08/22/2024  Prepared by: Caroline Luciano    Exercises  - Seated Plantar Fascia Mobilization with Small Ball  - 1 x daily - 7 x weekly - 1 sets - 2-3' hold  - Seated Plantar Fascia Stretch  - 1 x daily - 7 x weekly - 1 sets - 30\" hold  - Ankle Dorsiflexion with Resistance  - 1 x daily - 7 x weekly - 2 sets - 10 reps  - Ankle Eversion with Resistance  - 1 x daily - 7 x weekly - 2 sets - 10 reps  - Ankle Inversion with Resistance  - 1 x daily - 7 x weekly - 2 sets - 10 reps  - Ankle and Toe Plantarflexion with Resistance  - 1 x daily - 7 x weekly - 2 sets - 10 reps  - Gastroc Stretch on Wall  - 1 x daily - 7 x weekly - 1 sets - 30\" hold  - Toe Spreading  - 1 x daily - 7 x weekly - 2 sets - 10 reps    Treatment:    Therapeutic Exercise: x 15min  PROM - ankle DF + toe ext x 10   PROM - ankle PF + toe flexion x 10   4-way ankle: x 20 (R), red theraband   Seated plantar fascia stretch: x 30\" (R)   DLHR: x 20   Toe walk: attempted but d/c due to pain  Elastic band lateral walk: 2 laps x 20 steps (B), red theraband     Neuromuscular Re-education: x 15min  Ankle wobble/balance boar: A/P, M/L taps x 20, balance x 20\" each   Tandem stance balance: x 30\" (B)   Tandem stance balance  on airex: x 30\" (B)   Tandem walk on airex balance beam: x 2 laps   BOSU step-up: 1 x 10 (B), blue side up     Manual Therapy: x 10min  Soft tissue mobilization: (R) plantar fascia   Talocrural distraction: 3 x 20\" (R)   Posterior talocrural joint mobs: Grade 3, 4 x 10\" (R)     Provider Interactions With Patient:   Added therapeutic exercises as documented, with cueing provided throughout performance to ensure correct technique during exercise.  Verbally confirmed the patient's next appt date and time to ensure consistency with physical therapy attendance.     Assessment: Lorelei reports good improvement in her symptoms after her first appt and is able to manage her pain with the exercises issued for home in her HEP. She reports that she was with some pain after sitting at work for 4 hours and was with improved pain and walking gait pattern after sitting and doing her stretching exercises. We progressed ankle strengthening and stabilization exercises this date, which the patient tolerated well. She had one episode of cramping in the bottom of her foot after the ankle wobble/balance board exercise, but relieved with stretching of the plantar fascia. We will reassess next session and will update her HEP if no adverse reaction to exercise progressions.     Goals:   Short-Term Goals:  Patient will improve ankle mobility, strength, and endurance to facilitate ability to stand for 3-4 hour(s) daily for community integration. Timeframe: 4-6 visits.  Long-Term Goals:  Patient will improve ankle mobility, strength, and endurance to facilitate walking distances of 2-3 mile(s) daily for community integration. Timeframe: 6-8 visits.  Patient will improve foot mobility and plantar fascia extensibility to facilitate pain-free walking after stepping out of bed in the morning, without complaints of stiffness. Timeframe: 8-12 visits.  Patient will improve LEFS score by at least 9 points to indicate a true change in improved function  for ADL's and restoring PLF. Timeframe: 8-12 visits.    Plan: Follow up on tolerance to exercise progressions and update HEP accordingly.       Charges: MT x 1, NMR x 1, Therex x 1          Total Timed Treatment: 40min    Total Treatment Time: 40min

## 2024-08-29 ENCOUNTER — APPOINTMENT (OUTPATIENT)
Dept: PHYSICAL THERAPY | Age: 49
End: 2024-08-29
Attending: INTERNAL MEDICINE
Payer: COMMERCIAL

## 2024-08-29 NOTE — PROGRESS NOTES
Date of Service: 8/29/2024  Dx: Plantar fasciitis, right (M72.2)            Insurance: BCBS PPO  Insurance Limits: 70 visit limit  Visit #: 3  Authorized # of Visits: 8 recommended  POC/Auth Expiration: N/A  Date of Last PN: 8/22/24 (Visit #1)  Authorizing Physician/Provider: Carlos Eduardo Keenan MD visit: N/A  Fall Risk: Standard         Precautions: None            Subjective: The patient reports that she felt better after her last appt. She reports compliance with her HEP. She does feel like they are helping. She's been stretching in the morning and it's making a difference.     Objective:     Pain/Symptom Presentation:    Resting pain pre-tx: 1/10  Pain at worst: 3/10    HEP:   Access Code: [email or web address]  URL: [email or web address]  Date: 08/22/2024  Prepared by: Caroline Luciano    Exercises  - Seated Plantar Fascia Mobilization with Small Ball  - 1 x daily - 7 x weekly - 1 sets - 2-3' hold  - Seated Plantar Fascia Stretch  - 1 x daily - 7 x weekly - 1 sets - 30\" hold  - Ankle Dorsiflexion with Resistance  - 1 x daily - 7 x weekly - 2 sets - 10 reps  - Ankle Eversion with Resistance  - 1 x daily - 7 x weekly - 2 sets - 10 reps  - Ankle Inversion with Resistance  - 1 x daily - 7 x weekly - 2 sets - 10 reps  - Ankle and Toe Plantarflexion with Resistance  - 1 x daily - 7 x weekly - 2 sets - 10 reps  - Gastroc Stretch on Wall  - 1 x daily - 7 x weekly - 1 sets - 30\" hold  - Toe Spreading  - 1 x daily - 7 x weekly - 2 sets - 10 reps    Treatment:    Therapeutic Exercise: x 15min  PROM - ankle DF + toe ext x 10   PROM - ankle PF + toe flexion x 10   4-way ankle: x 20 (R), red theraband   Seated plantar fascia stretch: x 30\" (R)   DLHR: x 20   Toe walk: attempted but d/c due to pain  Elastic band lateral walk: 2 laps x 20 steps (B), red theraband     Neuromuscular Re-education: x 15min  Ankle wobble/balance boar: A/P, M/L taps x 20, balance x 20\" each   Tandem stance balance: x 30\" (B)   Tandem stance balance  on airex: x 30\" (B)   Tandem walk on airex balance beam: x 2 laps   BOSU step-up: 1 x 10 (B), blue side up     Manual Therapy: x 10min  Soft tissue mobilization: (R) plantar fascia   Talocrural distraction: 3 x 20\" (R)   Posterior talocrural joint mobs: Grade 3, 4 x 10\" (R)     Provider Interactions With Patient:   Added therapeutic exercises as documented, with cueing provided throughout performance to ensure correct technique during exercise.  Verbally confirmed the patient's next appt date and time to ensure consistency with physical therapy attendance.     Assessment: Lorelei reports good improvement in her symptoms after her first appt and is able to manage her pain with the exercises issued for home in her HEP. She reports that she was with some pain after sitting at work for 4 hours and was with improved pain and walking gait pattern after sitting and doing her stretching exercises. We progressed ankle strengthening and stabilization exercises this date, which the patient tolerated well. She had one episode of cramping in the bottom of her foot after the ankle wobble/balance board exercise, but relieved with stretching of the plantar fascia. We will reassess next session and will update her HEP if no adverse reaction to exercise progressions.     Goals:   Short-Term Goals:  Patient will improve ankle mobility, strength, and endurance to facilitate ability to stand for 3-4 hour(s) daily for community integration. Timeframe: 4-6 visits.  Long-Term Goals:  Patient will improve ankle mobility, strength, and endurance to facilitate walking distances of 2-3 mile(s) daily for community integration. Timeframe: 6-8 visits.  Patient will improve foot mobility and plantar fascia extensibility to facilitate pain-free walking after stepping out of bed in the morning, without complaints of stiffness. Timeframe: 8-12 visits.  Patient will improve LEFS score by at least 9 points to indicate a true change in improved function  for ADL's and restoring PLF. Timeframe: 8-12 visits.    Plan: Follow up on tolerance to exercise progressions and update HEP accordingly.       Charges: MT x 1, NMR x 1, Therex x 1          Total Timed Treatment: 40min    Total Treatment Time: 40min

## 2024-09-03 ENCOUNTER — OFFICE VISIT (OUTPATIENT)
Dept: PHYSICAL THERAPY | Age: 49
End: 2024-09-03
Attending: INTERNAL MEDICINE
Payer: COMMERCIAL

## 2024-09-03 PROCEDURE — 97112 NEUROMUSCULAR REEDUCATION: CPT

## 2024-09-03 PROCEDURE — 97140 MANUAL THERAPY 1/> REGIONS: CPT

## 2024-09-03 PROCEDURE — 97110 THERAPEUTIC EXERCISES: CPT

## 2024-09-03 NOTE — PROGRESS NOTES
Date of Service: 09/03/2024  Dx: Plantar fasciitis, right (M72.2)            Insurance: [employer] PPO  Insurance Limits: 70 visit limit  Visit #: 3  Authorized # of Visits: 8 recommended  POC/Auth Expiration: N/A  Date of Last PN: 8/22/24 (Visit #1)  Authorizing Physician/Provider: Carlos Eduardo Keenan MD visit: N/A  Fall Risk: Standard         Precautions: None            Subjective: The patient reports that she felt better after her last appt. She reports compliance with her HEP. She does feel like they are helping. She's been stretching in the morning and it's making a difference.     Objective:     Pain/Symptom Presentation:    Resting pain pre-tx: 2/10  Pain at worst: 4/10    HEP:   Access Code: [email or web address]  URL: [email or web address]  Date: 08/22/2024  Prepared by: Caroline Luciano    Exercises  - Seated Plantar Fascia Mobilization with Small Ball  - 1 x daily - 7 x weekly - 1 sets - 2-3' hold  - Seated Plantar Fascia Stretch  - 1 x daily - 7 x weekly - 1 sets - 30\" hold  - Ankle Dorsiflexion with Resistance  - 1 x daily - 7 x weekly - 2 sets - 10 reps  - Ankle Eversion with Resistance  - 1 x daily - 7 x weekly - 2 sets - 10 reps  - Ankle Inversion with Resistance  - 1 x daily - 7 x weekly - 2 sets - 10 reps  - Ankle and Toe Plantarflexion with Resistance  - 1 x daily - 7 x weekly - 2 sets - 10 reps  - Gastroc Stretch on Wall  - 1 x daily - 7 x weekly - 1 sets - 30\" hold  - Toe Spreading  - 1 x daily - 7 x weekly - 2 sets - 10 reps    Treatment:    Therapeutic Exercise: x 15 min    Seated plantar fascia stretch: 3 x 30\" (R)   4-way ankle: x 20 (R), red theraband   DLHR: x 20   Toe walk: attempted but d/c due to pain ** still not tolerated more than 3-4 steps 09/03  Added Standing Hip Abduction x 10 ea R/L   Added Standing Hip Ext x 10 ea R/L    Neuromuscular Re-education: x 10 min    Tandem stance balance: x 30\" (B)   Added SLS 2 x 15\" ea R/L  Ankle wobble/balance boar: A/P, M/L taps x 20, balance x 1'  each (struggle after 30\" noted for balancing)     Manual Therapy: x 15 min  Soft tissue mobilization: (R) plantar fascia   Cupping: R Lateral Gastroc, Plantar of R     Provider Interactions With Patient:   Added therapeutic exercises as documented, with cueing provided throughout performance to ensure correct technique during exercise.  Verbally confirmed the patient's next appt date and time to ensure consistency with physical therapy attendance.     Assessment: Patient returns to PT with only mild complaints of symptoms since previous visit, 4/10 at worst and is able to tolerate up to 15 mins of walk/jog at this time along with other exercises at the gym and HEP. Patient progressed with more unilateral stance this day, was able to tolerate for 15 seconds but seemed to struggle more with controlled taps and balance on FB both medial and lateral, and with anterior and posterior. Patient still with limitations more in higher levels of activity, would benefit from continued tissue mobility and intrinsic strengthening for ankle and foot in future visits.     Goals:   Short-Term Goals:  Patient will improve ankle mobility, strength, and endurance to facilitate ability to stand for 3-4 hour(s) daily for community integration. Timeframe: 4-6 visits.  Long-Term Goals:  Patient will improve ankle mobility, strength, and endurance to facilitate walking distances of 2-3 mile(s) daily for community integration. Timeframe: 6-8 visits.  Patient will improve foot mobility and plantar fascia extensibility to facilitate pain-free walking after stepping out of bed in the morning, without complaints of stiffness. Timeframe: 8-12 visits.  Patient will improve LEFS score by at least 9 points to indicate a true change in improved function for ADL's and restoring PLF. Timeframe: 8-12 visits.    Plan: Follow up on tolerance to exercise progressions and update HEP accordingly.       Charges: MT x 1, NMR x 1, Therex x 1          Total Timed  Treatment: 40 min    Total Treatment Time: 40 min

## 2024-09-05 ENCOUNTER — APPOINTMENT (OUTPATIENT)
Dept: PHYSICAL THERAPY | Age: 49
End: 2024-09-05
Attending: INTERNAL MEDICINE
Payer: COMMERCIAL

## 2024-10-08 ENCOUNTER — APPOINTMENT (OUTPATIENT)
Dept: PHYSICAL THERAPY | Age: 49
End: 2024-10-08
Attending: INTERNAL MEDICINE
Payer: COMMERCIAL

## 2024-11-01 DIAGNOSIS — E03.8 OTHER SPECIFIED HYPOTHYROIDISM: ICD-10-CM

## 2024-11-05 DIAGNOSIS — E03.8 OTHER SPECIFIED HYPOTHYROIDISM: ICD-10-CM

## 2024-11-05 RX ORDER — LEVOTHYROXINE SODIUM 75 UG/1
75 TABLET ORAL
Qty: 90 TABLET | Refills: 3 | Status: SHIPPED | OUTPATIENT
Start: 2024-11-05

## 2024-11-05 RX ORDER — LEVOTHYROXINE SODIUM 75 UG/1
75 TABLET ORAL
Qty: 90 TABLET | Refills: 1 | OUTPATIENT
Start: 2024-11-05

## 2024-11-05 NOTE — TELEPHONE ENCOUNTER
Refill passed per [facility] protocol.  Requested Prescriptions   Pending Prescriptions Disp Refills    levothyroxine 75 MCG Oral Tab 90 tablet 1     Sig: Take 1 tablet (75 mcg total) by mouth before breakfast.       Thyroid Medication Protocol Passed - 11/5/2024  2:56 PM        Passed - TSH in past 12 months        Passed - Last TSH value is normal     Lab Results   Component Value Date    TSH 2.320 07/09/2024                 Passed - In person appointment or virtual visit in the past 12 mos or appointment in next 3 mos     Recent Outpatient Visits              2 months ago     [facility], PTA    Office Visit    2 months ago     [facility]    Office Visit    2 months ago Plantar fasciitis, right    [facility]    Office Visit    2 months ago Encounter for therapeutic drug monitoring    [facility], [address] [place] [address] Gisela Bryant APRN    Office Visit    3 months ago Sprain of metacarpophalangeal (MCP) joint of right thumb, subsequent encounter    [facility], [address] [place] [address] Nba Driscoll DO    Office Visit          Future Appointments         Provider Department Appt Notes    In 2 weeks Jaylyn Bobo,  [facility], [place] [address], [place] - OB/GYN pre-menopaused    In 1 month Gisela Bryant APRN [facility], [address] [place] [address]     In 5 months Gisela Bryant APRN [facility], [address] [place] [address]     In 7 months Barry Nxi,  [facility], [facility] 1 yr sleep follow up                       Recent Outpatient Visits              2 months ago     [facility] [place], PTA    Office Visit    2 months ago     [facility] in [place] Caroline Luciano, PT    Office Visit     2 months ago Plantar fasciitis, right    [facility] in [place] Caroline Luciano, PT    Office Visit    2 months ago Encounter for therapeutic drug monitoring    [facility], [address] [place] [address] Gisela Bryant APRN    Office Visit    3 months ago Sprain of metacarpophalangeal (MCP) joint of right thumb, subsequent encounter    [facility] Nba Driscoll,     Office Visit          Future Appointments         Provider Department Appt Notes    In 2 weeks Jaylyn Bobo,  [facility], [place] [address], [place] - OB/GYN pre-menopaused    In 1 month Gisela Bryant APRN [facility], [address] [place] [address]     In 5 months Gisela Bryant APRN [facility], [address] [place] [address]     In 7 months Barry Nix DO [facility], [facility] 1 yr sleep follow up             Transfer Note

## 2024-11-14 ENCOUNTER — MED REC SCAN ONLY (OUTPATIENT)
Facility: CLINIC | Age: 49
End: 2024-11-14

## 2024-11-26 DIAGNOSIS — E66.811 CLASS 1 OBESITY WITH SERIOUS COMORBIDITY AND BODY MASS INDEX (BMI) OF 31.0 TO 31.9 IN ADULT, UNSPECIFIED OBESITY TYPE: ICD-10-CM

## 2024-11-26 DIAGNOSIS — G43.109 MIGRAINE WITH AURA AND WITHOUT STATUS MIGRAINOSUS, NOT INTRACTABLE: ICD-10-CM

## 2024-11-26 DIAGNOSIS — Z51.81 ENCOUNTER FOR THERAPEUTIC DRUG MONITORING: ICD-10-CM

## 2024-11-26 RX ORDER — TOPIRAMATE 25 MG/1
TABLET, FILM COATED ORAL
Qty: 60 TABLET | Refills: 3 | OUTPATIENT
Start: 2024-11-26

## 2024-12-17 ENCOUNTER — TELEMEDICINE (OUTPATIENT)
Dept: INTERNAL MEDICINE CLINIC | Facility: CLINIC | Age: 49
End: 2024-12-17
Payer: COMMERCIAL

## 2024-12-17 DIAGNOSIS — Z51.81 ENCOUNTER FOR THERAPEUTIC DRUG MONITORING: Primary | ICD-10-CM

## 2024-12-17 DIAGNOSIS — G43.109 MIGRAINE WITH AURA AND WITHOUT STATUS MIGRAINOSUS, NOT INTRACTABLE: ICD-10-CM

## 2024-12-17 DIAGNOSIS — G47.33 OSA ON CPAP: ICD-10-CM

## 2024-12-17 DIAGNOSIS — E78.1 HYPERTRIGLYCERIDEMIA: ICD-10-CM

## 2024-12-17 DIAGNOSIS — E66.811 CLASS 1 OBESITY WITH SERIOUS COMORBIDITY AND BODY MASS INDEX (BMI) OF 31.0 TO 31.9 IN ADULT, UNSPECIFIED OBESITY TYPE: ICD-10-CM

## 2024-12-17 DIAGNOSIS — R73.03 PREDIABETES: ICD-10-CM

## 2024-12-17 PROCEDURE — 99213 OFFICE O/P EST LOW 20 MIN: CPT | Performed by: NURSE PRACTITIONER

## 2024-12-17 RX ORDER — TOPIRAMATE 50 MG/1
50 TABLET, FILM COATED ORAL 2 TIMES DAILY
Qty: 180 TABLET | Refills: 1 | Status: SHIPPED | OUTPATIENT
Start: 2024-12-17

## 2024-12-17 RX ORDER — PHENTERMINE HYDROCHLORIDE 15 MG/1
15 CAPSULE ORAL EVERY MORNING
Qty: 30 CAPSULE | Refills: 3 | Status: SHIPPED | OUTPATIENT
Start: 2024-12-17

## 2024-12-17 NOTE — PATIENT INSTRUCTIONS
Continue making lifestyle changes that focus on good nutrition, regular exercise and stress management.    Medication Plan: Continue Phentermine at current dose and increase Topamax to 50 mg twice a day with option to increase again after 2 months if weight plateau develops. Can send [employer] message with home scale weight at that time.    Next steps to work on before next office visit include: Keep up the great work in making nutrition changes! Continue to focus on balanced nutrition with protein, produce and water at all meals. Eliminate sweetened beverages on a regular basis. Some additional tips/resources listed below. Please complete previously ordered fasting labs prior to next visit.    Holiday Weight and How to Avoid It    [employer] by Vicky Koch, PhD  https://www.obesityaction.org/resources/holiday-weight-and-rap-fx-gkbev-it/      When we think about the holidays many things come to mind - gifts, shopping, parties, family, decorating, long to-do lists --and delicious holiday treats.    Strategies for Avoiding Holiday Weight Gain  The holiday season is a busy time, and there are eating opportunities everywhere we go, such as family gatherings, office parties with trays of home-baked treats in the lunchroom, holiday and atf-or-gexnprrb programs at our kids’ schools, treat samples being given away as we make our way through the stores to do our holiday shopping and catalogs in our mailboxes with mouth-watering photo spreads on every page. We’re really busy, perhaps too busy to prepare the healthy meals we might otherwise prepare.    Here are a few tips that will help you negotiate this joyful time with minimum risk to your weight management goals:    Focus on maintaining your current weight. Challenging yourself to lose weight over the holidays is setting yourself up for failure.  Don’t gorge on any special holiday food because you only get to eat it once a year. With luck, you’ll still be  around to enjoy it next year. On the other hand, don’t deprive yourself of anything you want to taste. Instead, take a mindful bite, savoring the sight, taste, aroma, mouth feel and sound of each special holiday treat. Eating like this leads to increased pleasure, quicker satisfaction and decreased risk for weight gain.  Avoid the trap of thinking you can eat what you want because you can just start over in the New Year. It doesn’t get any easier just because it’s January - there are always other reasons to indulge and to celebrate.  Keep up your exercise routine. This will also help reduce holiday stress.  Keep tabs on yourself. Write down what you eat, weigh yourself if you want to or try on your favorite clothes to make sure they still fit.  Create meaning beyond the food by creating new traditions that have nothing to do with food. For example, change “in our family we always have chocolate cinnamon bread with whipped cream on [employer] [place] morning” into “in our family we always play in the snow (or on the beach), or go for a long walk/take food and gifts to the homeless shelter on [place] morning.”  Sometimes, eating a particular food is our way of remembering a lost loved one. If that applies to you, find another way to remember them, like sharing memories with family members.  Remember all the reasons why reaching and maintaining a healthy weight is important to you.  Remember, unless you’re an elite athlete, you’re unlikely to be able to “exercise off” weeks of overindulgence.  Strategies for Holiday Parties  Most of us love holiday parties and look forward to them all year. We get to dress up and go to nice places, spend time with our nearest and dearest, enjoy our favorite holiday music and engage in the traditions that are meaningful to us. Despite all of the excitement, parties can also be minefields when it comes to honoring our healthy lifestyle goals. When we love parties, we may over-indulge as a way  of intensifying the positive emotions we’re already feeling, and when we dislike parties, we may over-indulge in an effort to distract ourselves from the emotional discomfort that we’re feeling.    Here are a few tips that will help you get through holiday parties without sabotaging your goals:    Avoid wearing baggy clothing that allows you to expand as you eat.  After you’ve eaten, stay away from the food tables at the party.  Keep your hands busy by finding a way to help out. It’s the best way to distract yourself from the food.  Avoid alcohol. When we drink, we’re more likely to abandon healthy eating.  Fill up with water and other low-calorie drinks.  Take a healthy dish for the pot luck - something you can eat: consider salad, fruit, raw vegetables and a healthy dip.  Focus on your relationships, not on the food - learn to focus on enjoying the people and the special holiday experiences, on building special memories for yourself and your family.  Meeting new people is another good way of distracting yourself from the food. If you’re shy, simply be a good listener.  Plan ahead. The best kind of plan, when it comes to food, is about what you are going to eat - not about what you’re not going to eat. If we focus on what we can’t eat (or what we think we shouldn’t eat), this kind of thinking can set us up for failure because it simply leaves us feeling deprived.  Don’t arrive completely famished - you’ll be more likely to eat in a way you’ll later regret. Plan to eat on the light side both before and after the event. Think about your meal plan for the day, and leave yourself some room to eat at the party.  Coping with Holiday Stress  As you know, the holiday season can be joyful and stressful at the same time. There’s so much to do, being around family can sometimes be difficult and often, we set ourselves the goal of creating the “perfect” holiday. Being stressed puts us at risk for stress-based eating in an  effort to cope.    Here are some strategies you can use to reduce your stress levels:    Focus on what you’re grateful for.  Practice deep breathing whenever you feel overwhelmed.  Keep up your exercise routine.  Remind yourself to do just one thing at a time.  Remember -- you cannot do more than your best.  Be willing to say “no” to some events, tasks or requests. Sometimes this is the best way we can take care of ourselves.  Create a holiday season schedule for yourself. Schedule and prioritize everything you need to get done.  Reduce your expectations - aim for “good enough,” not “perfect.”  If you’re alone during the holidays, pamper yourself and find a way to help others who are less fortunate. This will help reduce your loneliness.  If your relationships with family members are strained, remember that over-indulging in your favorite holiday comfort foods is not going to change how they behave towards you!  Create fun times for yourself. Having fun is a great way of reducing stress!  I hope these tips will help you not just get through the holidays, but that they’ll allow you to feel reassured that you can still have a fun and meaningful time without having to sacrifice your weight management goals. Wishing you a happy, healthy and meaningful holiday season!      Sugar - It’s Not as Sweet as You Think    by Zo Miranda RN, BSN, CBN  OAC Summer 2014 @ https://www.obesityaction.org/resources/sugar-its-not-as-sweet-as-you-think/    According to the [place] Department of Agriculture (USDA), dietary trends from 4481-4390, sugar consumption increased by 19 percent since 1970. Today, the average American consumes 20 teaspoons or 100 pounds of sugar each year! That amounts to 300 calories a day from sugar alone. And to make matters worse (you will read why later in this article), corn syrup consumption is on the rise, increasing by 387 percent in the same period of time. The largest amount of sugar is not  being consumed in ingested fruits or other natural sugars. The largest amounts of consumed sugars are in the form of High Fructose Corn Syrup (HFCS) and brown sugar. Neither occurs naturally, and both are highly processed and in nearly every processed package food you will find in your local grocery store. Many of these foods you most likely would not suspect having sugar as an additive (see quiz at bottom of page).    What is sugar?  Sugar is a simple carbohydrate, which can either be a monosaccharide or disaccharide. Monosaccharides include glucose, fructose, and galactose. These three monosaccharides can join together to make the disaccharides maltose, sucrose, and lactose. These compounds are found in the foods we eat and are collectively called “sugar.”    The following are types of sugar and their natural source:    Most people associate the term “sugar” with the white sugar we put in coffee or iced tea. The human body uses glucose, the simplest unit of carbohydrate, as its primary fuel. Without adequate carbohydrate intake, our bodies will obtain glucose, or fuel, from another source. The possibilities include a breakdown of proteins we eat or proteins stored in our body, which may ultimately lead to muscle loss and affecting one’s metabolic rate or even malnutrition. However, our need is far below the current daily consumption.    Is sugar addictive? You be the .  When high doses of sugar are consumed, it stimulates the release of dopamine in our brains. This response makes us feel pleasure (now you know why when you feel down or depressed you may want to overindulge in sweets). The drug morphine, cocaine and sugar all stimulate the same brain receptors. This study has been proven many times in lab rat studies.    In his new and fascinating book, Salt Sugar Fat: How the Food Giants Hooked Us, Pulitzer Prize-winning  Kain Dorman (I highly recommend this book) goes inside the world of processed  and packaged foods. Dandy writes in detail about how the food industry (which is 17 percent of our economy) contributes to American’s obesity epidemic by infusing processed foods with sugar, salt, and fat to make it more addictive and pleasurable. You see now why so many continue to buy their products?    According to the [facility] for Food Policy and Obesity, the average child sees 5,500 food commercials a year that advertise high sugar breakfast cereals, fast food, soft drinks, candy and snacks. According to the [employer], the food industry spends $1.6 billion annually to reach children through the media, including the Internet.    What is high fructose corn syrup?  High fructose corn syrup (HFCS) is an industrial food product and not “natural” or a naturally occurring substance. It is extracted from corn stalks through a secret process. The sugars are extracted through a chemical enzymatic process resulting in HFCS.    Regular cane sugar (sucrose) is made of two-sugar molecules bound tightly together - glucose and fructose in equal amounts. The enzymes in your digestive tract must break down the sucrose into glucose and fructose, which are then absorbed into the body.    HFCS also consists of glucose and fructose, not in a 50-50 ratio, but a 55-45 fructose to glucose ratio in an unbound form. Fructose is sweeter than glucose. And HCFS is cheaper than sugar. One of the reasons is because of the government farm bill corn subsidies. Products with HFCS are much sweeter and much cheaper than products made with cane sugar.    Sugar and HFCS’ Effect on the Body  Eating sugar has a systemic effect on your entire body including increased risk for diabetes, increased appetite, weight gain, heart and liver problems, decreased immune system, certain cancers and even your brain function to name a few.    Type 2 Diabetes  Type 1 Diabetes is when one’s pancreas does not make insulin. Type 2 Diabetes is when  one’s body does not utilize insulin effectively. Type 1 Diabetes is usually diagnosed at a young age. Type 2 Diabetes used to occur in adulthood and was called “Adult Onset Diabetes,” however; it has since been renamed to Type 2 Diabetes because the onset is commonly seen at a much earlier age as the obesity epidemic increases. It has been estimated that just fewer than 2,000,000 individuals were diagnosed with Type 2 diabetes in 2010.    The pancreas acts on ingested sugar by secreting insulin. Insulin is a hormone that regulates the amount of sugar in the blood. If blood sugar gets too high or too low, it could be life-threatening. An increased amount of sugar in one’s diet causes the pancreas to secrete insulin. In some individuals, this leads to an overload on the pancreas and the development of Type 2 diabetes.    Sugar, Appetite & Weight Gain  Eating less sugar is linked with weight-loss, and eating more is linked with weight gain, according to a new review of published studies. The review lends support to the idea that advising people to limit the sugar in their diets may help lessen excess weight and obesity, the researchers conclude. “The really interesting finding is that increasing and decreasing sugar had virtually identical results (on weight), in the opposite direction of course,” says researcher SHARON Suárez, PhD, professor of human nutrition and medicine at the [facility] in [place].    According to leading nutritional expert, Jimmie Hanna MD, PhD, MPH, chair of nutrition at [employer] and author of Eat, Drink and Be Healthy, “Sugar increases body weight mainly by encouraging overeating.”    Heart and Liver Damage  A study published in the journal Hepatology in late 2012 found that consumption of fructose appears to affect the availability of the energy-transferring chemical ATP in the liver, thereby increasing the risk of liver cell malfunction and death.    In  another review of HFCS, The American Journal of Clinical Nutrition, Adam Leonard, PhD, Department of Nutrition, [facility] explains that HFCS is absorbed more rapidly than regular sugar, and that it doesn’t stimulate insulin or leptin production. This prevents you from triggering the body’s signals for being full and may lead to overconsumption of total calories.    A 2012 paper in the journal Nature, brought forward the idea that limitations and warnings should be placed on sugar similar to warnings we see on alcohol. The authors showed evidence that fructose and glucose in excess can have a toxic effect on the liver as the metabolism of ethanol (the alcohol contained in alcoholic beverages) had similarities to the metabolic pathways of fructose.    Another published study in the Journal of Nutrition in 2012, found that children who consumed high levels of fructose had lower blood levels of cardiovascular protective compounds, such as HDL cholesterol and adiponectin. Higher consumption of fructose led to higher levels of fat around the midsection, a significant risk factor for diabetes and cardiovascular disease.    Immune System  Eliminating all sugar from a cancer patient’s diet would harm healthy cells that need energy to function. For example, many fruits contain high levels of antioxidants which are known to be effective in fighting cancer; however, sugars that come from whole fruits are low in sugar. Plant-based nutrition is a benefit to our overall health including fighting or preventing cancers. These important antioxidants, phytochemicals, fiber, vitamins and minerals are found in these plant-based whole foods.    However, diets high in sugar and refined carbohydrates can lead to overweight and obesity, which indirectly increases cancer risk throughout time. Certain cancers including breast, prostate, colorectal and pancreatic are associated with obesity.    How can you avoid  these unhealthy consequences of (often hidden) sugar?  The best way to avoid sugar is to not consume obvious foods that are loaded with sugar. However, as discussed in this article, there are many packaged foods that surprisingly have added sugar and the more health-damaging high fructose corn syrup.  Therefore…    Eat nature’s foods  Avoid processed food (I call these factory foods, not real foods.)  Don’t eat foods in packages (or dramatically decrease consumption)  Eat foods that rot  Eat foods that walked the earth, flew in the norbert, swam in the ocean or grew in the soil     Beware!  Typically, the first ingredient on a label is the most prevalent in the food product; however, beware because there may be small amounts of many types of sugars, so none of them end up being in the first few ingredients (top 3) of the label. Sugar is disguised as a “healthy” ingredient, such as honey, rice syrup, or even “organic dehydrated cane juice.”    Here is a list of some of the possible “sugar” code words:    Corn sweetener  Corn syrup, or corn syrup solids  Dehydrated Cane Juice  Dextrin  Dextrose  Fructose  Fruit juice concentrate  Glucose  High-fructose corn syrup  Honey  Invert sugar  Lactose  Maltodextrin  Malt syrup  Maltose  Maple syrup  Molasses  Raw sugar  Rice syrup  Saccharose  Sorghum or sorghum syrup  Sucrose  Syrup  Treacle  Turbinado sugar  Xylose    High Fructose Corn Sugar (HFCS) Quiz:    Which of the below listed foods contain High Fructose Corn Syrup?    Kraft Macaroni and Cheese  Stove Top Stuffing - Home style Herb  Karen Sun Iced Tea  Ocean Spray Cranberry Juice  Wild Cherry Lifesavers  Robitussan Cough and Congestion  Wonderbread - White Bread  Smuckers Grape Jelly  Trenton’s Vegetable Soup  Mr & Mrs T Bloody Kathi Mix  Nabisco Fig Newtons - Whole Grain  Nabisco Fig Newtons - Fat Free  Wishbone Classic Caesars’ Dressing  Chicken of the Sea White Tuna, Spring [place]    Answer: All    Additional  Resources:    The Truth About Sugar - documentary on sugar free on Utube @ https://youtu.be/2Q0atqhDB3p  SUGAR: The Bitter Truth by Dr. Sunny Meneses (pediatric endocrinologist) - Lecture on sugar for free on  Utube @ https://youtu.be/dBnniua6-oM?[email or web address]      What are proteins?  Proteins are one of three primary macronutrients that provide energy to the human body, along with fats and carbohydrates. Proteins are also responsible for a large portion of the work that is done in cells; they are necessary for proper structure and function of tissues and organs, and also act to regulate them. They are comprised of a number of amino acids that are essential to proper body function, and serve as the building blocks of body tissue.  There are 20 different amino acids in total, and the sequence of amino acids determines a protein's structure and function. While some amino acids can be synthesized in the body, there are 9 amino acids that humans can only obtain from dietary sources (insufficient amounts of which may sometimes result in death), termed essential amino acids. Foods that provide all of the essential amino acids are called complete protein sources, and include both animal (meat, dairy, eggs, fish) as well as plant-based sources (soy, quinoa, buckwheat).    Proteins can be categorized based on the function they provide to the body. Below is a list of some types of proteins:  Antibody--proteins that protect the body from foreign particles, such as viruses and bacteria, by binding to them  Enzyme--proteins that help form new molecules as well as perform the many chemical reactions that occur throughout the body  Messenger--proteins that transmit signals throughout the body to maintain body processes  Structural component--proteins that act as building blocks for cells that ultimately allow the body to move  Transport/storage--proteins that move molecules throughout the body  As can be seen, proteins have  many important roles throughout the body, and as such, it is important to provide sufficient nutrition to the body to maintain healthy protein levels.    How much protein do I need?  The amount of protein that the human body requires daily is dependent on many conditions, including overall energy intake, growth of the individual, and physical activity level. It is often estimated based on body weight, as a percentage of total caloric intake (10-35%), or based on age alone. 0.8g/kg of body weight is a commonly cited recommended dietary allowance (RDA). This value is the minimum recommended value to maintain basic nutritional requirements, but consuming more protein, up to a certain point, maybe beneficial, depending on the sources of the protein.  The recommended range of protein intake is between 0.8 g/kg and 1.8 g/kg of body weight, dependent on the many factors listed above. People who are highly active, or who wish to build more muscle should generally consume more protein. Some sources suggest consuming between 1.8 to 2 g/kg for those who are highly active. The amount of protein a person should consume, to date, is not an exact science, and each individual should consult a specialist, be it a dietitian, doctor, or , to help determine their individual needs. I recommend your daily protein intake to be 100 grams/day.    Foods high in protein  There are many different combinations of food that a person can eat to meet their protein intake requirements. For many people, a large portion of protein intake comes from meat and dairy, though it is possible to get enough protein while meeting certain dietary restrictions you might have. Generally, it is easier to meet your RDA of protein by consuming meat and dairy, but an excess of either can have a negative health impact. There are plenty of plant-based protein options, but they generally contain less protein in a given serving. Ideally, a person should  consume a mixture of meat, dairy, and plant-based foods in order to meet their RDA and have a balanced diet replete with nutrients.    If possible, consuming a variety of complete proteins is recommended. A complete protein is a protein that contains a good amount of each of the nine essential amino acids required in the human diet. Examples of complete protein foods or meals include:    Meat/Dairy examples  Eggs  Chicken breast  Cottage cheese  Greek yogurt  Milk  Lean beef  Tuna  Turkey breast  Fish  Shrimp    Vegan/plant-based examples  Buckwheat  Hummus and devon  Soy products (tofu, tempeh, edamame beans)  Peanut butter on toast or some other bread  Beans and rice  Quinoa  Hemp and kenroy seeds  Spirulina  Generally, meat, poultry, fish, eggs, and dairy products are complete protein sources. Nuts and seeds, legumes, grains, and vegetables, among other things, are usually incomplete proteins. There is nothing wrong with incomplete proteins however, and there are many healthy, high protein foods that are incomplete proteins. As long as you consume a sufficient variety of incomplete proteins to get all the required amino acids, it is not necessary to specifically eat complete protein foods. In fact, certain high fat red meats for example, a common source of complete proteins, can be unhealthy.   Below are some examples of high protein foods that are not complete proteins:  Almonds  Oats  Broccoli  Lentils  Juan bread  Kenroy seeds  Pumpkin seeds  Peanuts  Reno sprouts  Grapefruit  Green peas  Avocados  Mushrooms  As can be seen, there are many different foods a person can consume to meet their RDA of protein. The examples provided above do not constitute an exhaustive list of high protein or complete protein foods. As with everything else, balance is important, and the examples provided above are an attempt at providing a list of healthier protein options (when consumed in moderation).    Amount of protein in  common food      Protein Amount  Milk (1 cup/8 oz)  8 g  Egg (1 large/50 g)  6 g  Meat (1 slice / 2 oz)  14 g  Seafood (2 oz)  16 g  Bread (1 slice/64 g)   8 g  Corn (1 cup/166 g)  16 g  Rice (1 cup/195 g)  5 g  Dry Bean (1 cup/92 g)   16 g  Nuts (1 cup/92 g)    20 g  Fruits and Veggie (1 cup)  1 g    Data above taken from www.calculator.net    The Power of Protein:    High Protein Foods:  FISH  (3-6 ounces/meal)  All types of fish  Seafood (shrimp, scallops, clams, mussels, lobster)  EGGS     2-3 eggs/meal  DAIRY (2/3 to 1 ½ cup)  Cottage cheese   Greek yogurt  PLANTS (½-3/4 cup/meal)  Legumes: Dried beans and peas (black beans, jackson beans, garbanzo beans, kidney, cannellini, navy, split peas, black eyed peas)  Lentils  Quinoa  Soy (edamame, tofu)  PORK   (3-6 oz/meal)  Tenderloin  Pork chop  Top loin roast, boneless  Sirloin roast, boneless  Camuy gonzalez (nitrate free)  Boiled deli ham (nitrate free)    Additional Protein Sources:  BEEF    (3-6 oz/meal)  Flank steak       Skirt steak  Bottom round(rump roast), select   Ground beef, 90% lean (ground sirloin)  Avelino eye steak, choice  Eye of round roast, choice   POULTRY  (3-6 oz/meal)  Ground chicken or turkey  Chicken, no skin  Turkey, no skin  PROTEIN SHAKES: OWYN and Koia (plant based and dairy free), Corepower by ePatient[employer]inder, Swathi (plant based option available), Premier Protein, JuicePlus Complete (www.juiceplus.com)  PROTEIN BARS: RXBAR, PowerCrunch, Quest, Barebells, Mosh

## 2024-12-17 NOTE — PROGRESS NOTES
[facility] Weight Management follow up via video visit:    Subjective    This visit is conducted using Telemedicine with live, interactive video and audio.    Chief Complaint:  Routine follow up visit for lifestyle and medical management for overweight, obesity, or morbid obesity. LOV in clinic weight: 191#.    PMH reviewed. Bariatric surgery planned or hx of surgery in the past: no.    HPI:   Lorelei Clemente is a 49 year old female who is being followed up today for lifestyle and medical management as deemed appropriate for overweight, obesity or morbid obesity. Patient reports weight loss monitoring at home via scale with a weight of 180#. This appears to be a weight loss since LOV 4 months ago in clinic. Patient has been consistent with medication but stopped Metformin ER d/t feeling nausea and diarrhea.    Questions/Concerns/Comments since LOV: She has not completed labs, nor met with the dietician. Has not checked on coverage for AOMs. Migraines about 1x/month. Feeling good about success. Portions are reduced. Planning to work on reducing sweetened beverages next.    Lifestyle/Social Hx Reviewed:    [facility] Weight Loss Follow Up    Question 12/17/2024 10:14 AM CST - Filed by Patient   Please describe a success moment: Lost about 12 to 14 pounds   Please describe a challenging moment/needs for improvement: Time to plan a more balance diet   Please complete this 24 hour food journal, listing everything you had to eat in the past day. Include the average time of day you ate these meals at    List foods, qty and prep for breakfast: 2 toasts, banana, greek yogurt, coffe with cream   List foods, qty and prep for lunch. 1 bowl of soup or sandwich, or plate of stew, 1 cup of sweet tea or soft drink   List foods, qty and prep for dinner. Small poertiin of sandwich ir stew small drink   List foods, qty and prep for snacks. Fruit or nuts, or crackers   List the types and qty of fluids consumed Coffee,  soda, sweet tea, juices, water   On average, how many meals did you eat out per week?    Exercise    How many days per week are you active or exercise 5   On average, how many days were anaerobic (strength/resistance) exercises performed? 2   On average, how many days were aerobic (cardio) exercises performed? 3   Perceived level of exertion on a scale of 1-5, with 5 being very intense: 3   Stress    Average stress level on a scale of 1-10, with 10 being extremely stressed: 6   If greater than 5/1O how would you grade your coping mechanisms? moderate   Sleep hours and integrity    How many hours of uninterrupted sleep do you get a night: 7   Do you feel rested in the morning: Yes   If no, what may have been disrupting your sleep?    Please list any goal(s) for your next visit Plan more balance meals and exercise       ROS  General: feeling well, denies fatigue  EYES: denies vision changes or high pain/pressure.  CV: denies cp, palpitations  Resp: denies sob  GI: denies abdominal pain. Denies N/V/D/C.  Neuro: denies paresthesia or cognitive changes  Psych: denies any mood changes    Physical Exam:  >   BP Readings from Last 3 Encounters:   08/14/24 108/76   07/17/24 122/74   06/12/24 118/72       Home weight: see above  Home BP: not available  Home blood sugars: n/a  Today's calculated BMI from reported weight: 30.4    General: patient speaking in full sentences, no increased work of breathing. alert, appears stated age, cooperative, no distress, and mildly obese  HENT: normocephalic  Resp: Breathing is non labored  Psych: patient appears cheerful, smiling, making good eye contact    Diagnoses and all orders for this visit:    Encounter for therapeutic drug monitoring  -     topiramate 50 MG Oral Tab; Take 1 tablet (50 mg total) by mouth 2 (two) times daily.  -     Phentermine HCl 15 MG Oral Cap; Take 1 capsule (15 mg total) by mouth every morning.    Class 1 obesity with serious comorbidity and body mass index (BMI)  of 31.0 to 31.9 in adult, unspecified obesity type  -     topiramate 50 MG Oral Tab; Take 1 tablet (50 mg total) by mouth 2 (two) times daily.  -     Phentermine HCl 15 MG Oral Cap; Take 1 capsule (15 mg total) by mouth every morning.    Prediabetes    Hypertriglyceridemia    BENITA on CPAP    Migraine with aura and without status migrainosus, not intractable  -     topiramate 50 MG Oral Tab; Take 1 tablet (50 mg total) by mouth 2 (two) times daily.        Patient Instructions   Continue making lifestyle changes that focus on good nutrition, regular exercise and stress management.    Medication Plan: Continue Phentermine at current dose and increase Topamax to 50 mg twice a day with option to increase again after 2 months if weight plateau develops. Can send [employer] message with home scale weight at that time.    Next steps to work on before next office visit include: Keep up the great work in making nutrition changes! Continue to focus on balanced nutrition with protein, produce and water at all meals. Eliminate sweetened beverages on a regular basis. Some additional tips/resources listed below. Please complete previously ordered fasting labs prior to next visit.    Holiday Weight and How to Avoid It    Obesity Action Coalition by Vicky Koch, PhD  https://www.obesityaction.org/resources/holiday-weight-and-yai-ar-xvfmm-it/      When we think about the holidays many things come to mind - gifts, shopping, parties, family, decorating, long to-do lists --and delicious holiday treats.    Strategies for Avoiding Holiday Weight Gain  The holiday season is a busy time, and there are eating opportunities everywhere we go, such as family gatherings, office parties with trays of home-baked treats in the lunchroom, holiday and [id number] programs at our kids’ schools, treat samples being given away as we make our way through the stores to do our holiday shopping and catalogs in our mailboxes with mouth-watering photo  spreads on every page. We’re really busy, perhaps too busy to prepare the healthy meals we might otherwise prepare.    Here are a few tips that will help you negotiate this joyful time with minimum risk to your weight management goals:    Focus on maintaining your current weight. Challenging yourself to lose weight over the holidays is setting yourself up for failure.  Don’t gorge on any special holiday food because you only get to eat it once a year. With luck, you’ll still be around to enjoy it next year. On the other hand, don’t deprive yourself of anything you want to taste. Instead, take a mindful bite, savoring the sight, taste, aroma, mouth feel and sound of each special holiday treat. Eating like this leads to increased pleasure, quicker satisfaction and decreased risk for weight gain.  Avoid the trap of thinking you can eat what you want because you can just start over in the New Year. It doesn’t get any easier just because it’s January - there are always other reasons to indulge and to celebrate.  Keep up your exercise routine. This will also help reduce holiday stress.  Keep tabs on yourself. Write down what you eat, weigh yourself if you want to or try on your favorite clothes to make sure they still fit.  Create meaning beyond the food by creating new traditions that have nothing to do with food. For example, change “in our family we always have chocolate cinnamon bread with whipped cream on Nia morning” into “in our family we always play in the snow (or on the beach), or go for a long walk/take food and gifts to the homeless shelter on Nia morning.”  Sometimes, eating a particular food is our way of remembering a lost loved one. If that applies to you, find another way to remember them, like sharing memories with family members.  Remember all the reasons why reaching and maintaining a healthy weight is important to you.  Remember, unless you’re an elite athlete, you’re unlikely to be able  to “exercise off” weeks of overindulgence.  Strategies for Holiday Parties  Most of us love holiday parties and look forward to them all year. We get to dress up and go to nice places, spend time with our nearest and dearest, enjoy our favorite holiday music and engage in the traditions that are meaningful to us. Despite all of the excitement, parties can also be minefields when it comes to honoring our healthy lifestyle goals. When we love parties, we may over-indulge as a way of intensifying the positive emotions we’re already feeling, and when we dislike parties, we may over-indulge in an effort to distract ourselves from the emotional discomfort that we’re feeling.    Here are a few tips that will help you get through holiday parties without sabotaging your goals:    Avoid wearing baggy clothing that allows you to expand as you eat.  After you’ve eaten, stay away from the food tables at the party.  Keep your hands busy by finding a way to help out. It’s the best way to distract yourself from the food.  Avoid alcohol. When we drink, we’re more likely to abandon healthy eating.  Fill up with water and other low-calorie drinks.  Take a healthy dish for the pot luck - something you can eat: consider salad, fruit, raw vegetables and a healthy dip.  Focus on your relationships, not on the food - learn to focus on enjoying the people and the special holiday experiences, on building special memories for yourself and your family.  Meeting new people is another good way of distracting yourself from the food. If you’re shy, simply be a good listener.  Plan ahead. The best kind of plan, when it comes to food, is about what you are going to eat - not about what you’re not going to eat. If we focus on what we can’t eat (or what we think we shouldn’t eat), this kind of thinking can set us up for failure because it simply leaves us feeling deprived.  Don’t arrive completely famished - you’ll be more likely to eat in a way  you’ll later regret. Plan to eat on the light side both before and after the event. Think about your meal plan for the day, and leave yourself some room to eat at the party.  Coping with Holiday Stress  As you know, the holiday season can be joyful and stressful at the same time. There’s so much to do, being around family can sometimes be difficult and often, we set ourselves the goal of creating the “perfect” holiday. Being stressed puts us at risk for stress-based eating in an effort to cope.    Here are some strategies you can use to reduce your stress levels:    Focus on what you’re grateful for.  Practice deep breathing whenever you feel overwhelmed.  Keep up your exercise routine.  Remind yourself to do just one thing at a time.  Remember -- you cannot do more than your best.  Be willing to say “no” to some events, tasks or requests. Sometimes this is the best way we can take care of ourselves.  Create a holiday season schedule for yourself. Schedule and prioritize everything you need to get done.  Reduce your expectations - aim for “good enough,” not “perfect.”  If you’re alone during the holidays, pamper yourself and find a way to help others who are less fortunate. This will help reduce your loneliness.  If your relationships with family members are strained, remember that over-indulging in your favorite holiday comfort foods is not going to change how they behave towards you!  Create fun times for yourself. Having fun is a great way of reducing stress!  I hope these tips will help you not just get through the holidays, but that they’ll allow you to feel reassured that you can still have a fun and meaningful time without having to sacrifice your weight management goals. Wishing you a happy, healthy and meaningful holiday season!      Sugar - It’s Not as Sweet as You Think    by Zo Miranda, RN, BSN, CBN  OAC Summer 2014 @  https://www.obesityaction.org/resources/sugar-its-not-as-sweet-as-you-think/    According to the [place] Department of Agriculture (USDA), dietary trends from 1334-1107, sugar consumption increased by 19 percent since 1970. Today, the average American consumes 20 teaspoons or 100 pounds of sugar each year! That amounts to 300 calories a day from sugar alone. And to make matters worse (you will read why later in this article), corn syrup consumption is on the rise, increasing by 387 percent in the same period of time. The largest amount of sugar is not being consumed in ingested fruits or other natural sugars. The largest amounts of consumed sugars are in the form of High Fructose Corn Syrup (HFCS) and brown sugar. Neither occurs naturally, and both are highly processed and in nearly every processed package food you will find in your local grocery store. Many of these foods you most likely would not suspect having sugar as an additive (see quiz at bottom of page).    What is sugar?  Sugar is a simple carbohydrate, which can either be a monosaccharide or disaccharide. Monosaccharides include glucose, fructose, and galactose. These three monosaccharides can join together to make the disaccharides maltose, sucrose, and lactose. These compounds are found in the foods we eat and are collectively called “sugar.”    The following are types of sugar and their natural source:    Most people associate the term “sugar” with the white sugar we put in coffee or iced tea. The human body uses glucose, the simplest unit of carbohydrate, as its primary fuel. Without adequate carbohydrate intake, our bodies will obtain glucose, or fuel, from another source. The possibilities include a breakdown of proteins we eat or proteins stored in our body, which may ultimately lead to muscle loss and affecting one’s metabolic rate or even malnutrition. However, our need is far below the current daily consumption.    Is sugar addictive? You  be the .  When high doses of sugar are consumed, it stimulates the release of dopamine in our brains. This response makes us feel pleasure (now you know why when you feel down or depressed you may want to overindulge in sweets). The drug morphine, cocaine and sugar all stimulate the same brain receptors. This study has been proven many times in lab rat studies.    In his new and fascinating book, Salt Sugar Fat: How the Food Giants Hooked Us, Pulitzer Prize-winning  Kain Dorman (I highly recommend this book) goes inside the world of processed and packaged foods. Dandy writes in detail about how the food industry (which is 17 percent of our economy) contributes to American’s obesity epidemic by infusing processed foods with sugar, salt, and fat to make it more addictive and pleasurable. You see now why so many continue to buy their products?    According to the [facility] Food Policy and Obesity, the average child sees 5,500 food commercials a year that advertise high sugar breakfast cereals, fast food, soft drinks, candy and snacks. According to the Federal Trade Commission, the food industry spends $1.6 billion annually to reach children through the media, including the Internet.    What is high fructose corn syrup?  High fructose corn syrup (HFCS) is an industrial food product and not “natural” or a naturally occurring substance. It is extracted from corn stalks through a secret process. The sugars are extracted through a chemical enzymatic process resulting in HFCS.    Regular cane sugar (sucrose) is made of two-sugar molecules bound tightly together - glucose and fructose in equal amounts. The enzymes in your digestive tract must break down the sucrose into glucose and fructose, which are then absorbed into the body.    HFCS also consists of glucose and fructose, not in a 50-50 ratio, but a 55-45 fructose to glucose ratio in an unbound form. Fructose is sweeter than glucose. And HCFS is  cheaper than sugar. One of the reasons is because of the government farm bill corn subsidies. Products with HFCS are much sweeter and much cheaper than products made with cane sugar.    Sugar and HFCS’ Effect on the Body  Eating sugar has a systemic effect on your entire body including increased risk for diabetes, increased appetite, weight gain, heart and liver problems, decreased immune system, certain cancers and even your brain function to name a few.    Type 2 Diabetes  Type 1 Diabetes is when one’s pancreas does not make insulin. Type 2 Diabetes is when one’s body does not utilize insulin effectively. Type 1 Diabetes is usually diagnosed at a young age. Type 2 Diabetes used to occur in adulthood and was called “Adult Onset Diabetes,” however; it has since been renamed to Type 2 Diabetes because the onset is commonly seen at a much earlier age as the obesity epidemic increases. It has been estimated that just fewer than 2,000,000 individuals were diagnosed with Type 2 diabetes in 2010.    The pancreas acts on ingested sugar by secreting insulin. Insulin is a hormone that regulates the amount of sugar in the blood. If blood sugar gets too high or too low, it could be life-threatening. An increased amount of sugar in one’s diet causes the pancreas to secrete insulin. In some individuals, this leads to an overload on the pancreas and the development of Type 2 diabetes.    Sugar, Appetite & Weight Gain  Eating less sugar is linked with weight-loss, and eating more is linked with weight gain, according to a new review of published studies. The review lends support to the idea that advising people to limit the sugar in their diets may help lessen excess weight and obesity, the researchers conclude. “The really interesting finding is that increasing and decreasing sugar had virtually identical results (on weight), in the opposite direction of course,” says researcher SHARON Suárez, PhD, professor of human nutrition and  medicine at the [facility] in [facility].    According to leading nutritional expert, Jimmie Hanna MD, PhD, MPH, chair of nutrition at [employer] and author of Eat, Drink and Be Healthy, “Sugar increases body weight mainly by encouraging overeating.”    Heart and Liver Damage  A study published in the journal Hepatology in late 2012 found that consumption of fructose appears to affect the availability of the energy-transferring chemical ATP in the liver, thereby increasing the risk of liver cell malfunction and death.    In another review of HFCS, The American Journal of Clinical Nutrition, Adam Leonard, PhD, Department of Nutrition, [facility] explains that HFCS is absorbed more rapidly than regular sugar, and that it doesn’t stimulate insulin or leptin production. This prevents you from triggering the body’s signals for being full and may lead to overconsumption of total calories.    A 2012 paper in the journal Nature, brought forward the idea that limitations and warnings should be placed on sugar similar to warnings we see on alcohol. The authors showed evidence that fructose and glucose in excess can have a toxic effect on the liver as the metabolism of ethanol (the alcohol contained in alcoholic beverages) had similarities to the metabolic pathways of fructose.    Another published study in the Journal of Nutrition in 2012, found that children who consumed high levels of fructose had lower blood levels of cardiovascular protective compounds, such as HDL cholesterol and adiponectin. Higher consumption of fructose led to higher levels of fat around the midsection, a significant risk factor for diabetes and cardiovascular disease.    Immune System  Eliminating all sugar from a cancer patient’s diet would harm healthy cells that need energy to function. For example, many fruits contain high levels of antioxidants which are known to be effective in  fighting cancer; however, sugars that come from whole fruits are low in sugar. Plant-based nutrition is a benefit to our overall health including fighting or preventing cancers. These important antioxidants, phytochemicals, fiber, vitamins and minerals are found in these plant-based whole foods.    However, diets high in sugar and refined carbohydrates can lead to overweight and obesity, which indirectly increases cancer risk throughout time. Certain cancers including breast, prostate, colorectal and pancreatic are associated with obesity.    How can you avoid these unhealthy consequences of (often hidden) sugar?  The best way to avoid sugar is to not consume obvious foods that are loaded with sugar. However, as discussed in this article, there are many packaged foods that surprisingly have added sugar and the more health-damaging high fructose corn syrup.  Therefore…    Eat nature’s foods  Avoid processed food (I call these factory foods, not real foods.)  Don’t eat foods in packages (or dramatically decrease consumption)  Eat foods that rot  Eat foods that walked the earth, flew in the norbert, swam in the ocean or grew in the soil     Beware!  Typically, the first ingredient on a label is the most prevalent in the food product; however, beware because there may be small amounts of many types of sugars, so none of them end up being in the first few ingredients (top 3) of the label. Sugar is disguised as a “healthy” ingredient, such as honey, rice syrup, or even “organic dehydrated cane juice.”    Here is a list of some of the possible “sugar” code words:    Corn sweetener  Corn syrup, or corn syrup solids  Dehydrated Cane Juice  Dextrin  Dextrose  Fructose  Fruit juice concentrate  Glucose  High-fructose corn syrup  Honey  Invert sugar  Lactose  Maltodextrin  Malt syrup  Maltose  Maple syrup  Molasses  Raw sugar  Rice syrup  Saccharose  Sorghum or sorghum syrup  Sucrose  Syrup  Treacle  Turbinado  sugar  Xylose    High Fructose Corn Sugar (HFCS) Quiz:    Which of the below listed foods contain High Fructose Corn Syrup?    Kraft Macaroni and Cheese  Stove Top Stuffing - Home style Herb  Karen Sun Iced Tea  Ocean Spray Cranberry Juice  Wild Cherry Lifesavers  Robitussan Cough and Congestion  Wonderbread - White Bread  Smuckers Grape Jelly  Chowdhury’s Vegetable Soup  Mr & Mrs T Bloody Kathi Mix  Nabisco Fig Newtons - Whole Grain  Nabisco Fig Newtons - Fat Free  Wishbone Classic Caesars’ Dressing  Chicken of the Sea White Tuna, [place]    Answer: All    Additional Resources:    The Truth About Sugar - documentary on sugar free on Utube @ https://youtu.be/8Q9opggZR8c  SUGAR: The Bitter Truth by Dr. Sunny Meneses (pediatric endocrinologist) - Lecture on sugar for free on  Utube @ https://youtu.be/dBnniua6-oM?si=[email or web address]      What are proteins?  Proteins are one of three primary macronutrients that provide energy to the human body, along with fats and carbohydrates. Proteins are also responsible for a large portion of the work that is done in cells; they are necessary for proper structure and function of tissues and organs, and also act to regulate them. They are comprised of a number of amino acids that are essential to proper body function, and serve as the building blocks of body tissue.  There are 20 different amino acids in total, and the sequence of amino acids determines a protein's structure and function. While some amino acids can be synthesized in the body, there are 9 amino acids that humans can only obtain from dietary sources (insufficient amounts of which may sometimes result in death), termed essential amino acids. Foods that provide all of the essential amino acids are called complete protein sources, and include both animal (meat, dairy, eggs, fish) as well as plant-based sources (soy, quinoa, buckwheat).    Proteins can be categorized based on the function they provide to the body.  Below is a list of some types of proteins:  Antibody--proteins that protect the body from foreign particles, such as viruses and bacteria, by binding to them  Enzyme--proteins that help form new molecules as well as perform the many chemical reactions that occur throughout the body  Messenger--proteins that transmit signals throughout the body to maintain body processes  Structural component--proteins that act as building blocks for cells that ultimately allow the body to move  Transport/storage--proteins that move molecules throughout the body  As can be seen, proteins have many important roles throughout the body, and as such, it is important to provide sufficient nutrition to the body to maintain healthy protein levels.    How much protein do I need?  The amount of protein that the human body requires daily is dependent on many conditions, including overall energy intake, growth of the individual, and physical activity level. It is often estimated based on body weight, as a percentage of total caloric intake (10-35%), or based on age alone. 0.8g/kg of body weight is a commonly cited recommended dietary allowance (RDA). This value is the minimum recommended value to maintain basic nutritional requirements, but consuming more protein, up to a certain point, maybe beneficial, depending on the sources of the protein.  The recommended range of protein intake is between 0.8 g/kg and 1.8 g/kg of body weight, dependent on the many factors listed above. People who are highly active, or who wish to build more muscle should generally consume more protein. Some sources suggest consuming between 1.8 to 2 g/kg for those who are highly active. The amount of protein a person should consume, to date, is not an exact science, and each individual should consult a specialist, be it a dietitian, doctor, or , to help determine their individual needs. I recommend your daily protein intake to be 100 grams/day.    Foods  high in protein  There are many different combinations of food that a person can eat to meet their protein intake requirements. For many people, a large portion of protein intake comes from meat and dairy, though it is possible to get enough protein while meeting certain dietary restrictions you might have. Generally, it is easier to meet your RDA of protein by consuming meat and dairy, but an excess of either can have a negative health impact. There are plenty of plant-based protein options, but they generally contain less protein in a given serving. Ideally, a person should consume a mixture of meat, dairy, and plant-based foods in order to meet their RDA and have a balanced diet replete with nutrients.    If possible, consuming a variety of complete proteins is recommended. A complete protein is a protein that contains a good amount of each of the nine essential amino acids required in the human diet. Examples of complete protein foods or meals include:    Meat/Dairy examples  Eggs  Chicken breast  Cottage cheese  Greek yogurt  Milk  Lean beef  Tuna  Turkey breast  Fish  Shrimp    Vegan/plant-based examples  Buckwheat  Hummus and devon  Soy products (tofu, tempeh, edamame beans)  Peanut butter on toast or some other bread  Beans and rice  Quinoa  Hemp and hugo seeds  Spirulina  Generally, meat, poultry, fish, eggs, and dairy products are complete protein sources. Nuts and seeds, legumes, grains, and vegetables, among other things, are usually incomplete proteins. There is nothing wrong with incomplete proteins however, and there are many healthy, high protein foods that are incomplete proteins. As long as you consume a sufficient variety of incomplete proteins to get all the required amino acids, it is not necessary to specifically eat complete protein foods. In fact, certain high fat red meats for example, a common source of complete proteins, can be unhealthy.   Below are some examples of high protein foods that  are not complete proteins:  Almonds  Oats  Broccoli  Lentils  Juan bread  [place] seeds  Pumpkin seeds  Peanuts  [place] sprouts  Grapefruit  Green peas  Avocados  Mushrooms  As can be seen, there are many different foods a person can consume to meet their RDA of protein. The examples provided above do not constitute an exhaustive list of high protein or complete protein foods. As with everything else, balance is important, and the examples provided above are an attempt at providing a list of healthier protein options (when consumed in moderation).    Amount of protein in common food      Protein Amount  Milk (1 cup/8 oz)  8 g  Egg (1 large/50 g)  6 g  Meat (1 slice / 2 oz)  14 g  Seafood (2 oz)  16 g  Bread (1 slice/64 g)   8 g  Corn (1 cup/166 g)  16 g  Rice (1 cup/195 g)  5 g  Dry Bean (1 cup/92 g)   16 g  Nuts (1 cup/92 g)    20 g  Fruits and Veggie (1 cup)  1 g    Data above taken from www.calculator.net    The Power of Protein:    High Protein Foods:  FISH  (3-6 ounces/meal)  All types of fish  Seafood (shrimp, scallops, clams, mussels, lobster)  EGGS     2-3 eggs/meal  DAIRY (2/3 to 1 ½ cup)  Cottage cheese   Greek yogurt  PLANTS (½-3/4 cup/meal)  Legumes: Dried beans and peas (black beans, jackson beans, garbanzo beans, kidney, cannellini, navy, split peas, black eyed peas)  Lentils  Quinoa  Soy (edamame, tofu)  PORK   (3-6 oz/meal)  Tenderloin  Pork chop  Top loin roast, boneless  Sirloin roast, boneless  Moldovan gonzalez (nitrate free)  Boiled deli ham (nitrate free)    Additional Protein Sources:  BEEF    (3-6 oz/meal)  Flank steak       Skirt steak  Bottom round(rump roast), select   Ground beef, 90% lean (ground sirloin)  Avelino eye steak, choice  Eye of round roast, choice   POULTRY  (3-6 oz/meal)  Ground chicken or turkey  Chicken, no skin  Turkey, no skin  PROTEIN SHAKES: OWYN and Koia (plant based and dairy free), Corepower by ZixiSwathi (plant based option available), Premier Protein, JuicePlus  Complete (www.BDNA.com)  PROTEIN BARS: RXBAR, PowerCrunch, Quest, Barebells, Mosh        Return in about 4 months (around 4/17/2025) for weight management via clinic as scheduled.    DOCUMENTATION OF TIME SPENT: Code selection for this visit was based on time spent : 25 minutes on date of service in preparing to see the patient, obtaining and/or reviewing separately obtained history, performing a medically appropriate examination, counseling and educating the patient/family/caregiver, ordering medications or testing, referring and communicating with other healthcare providers, documenting clinical information in the electronic medical record, independently interpreting results and communicating results to the patient/family/caregiver and care coordination with the patient's other providers.    Answers submitted by the patient for this visit:  Medical Weight Loss Follow Up (Submitted on 12/17/2024)  If greater than 5/1O how would you grade your coping mechanisms?: moderate

## 2024-12-18 ENCOUNTER — LAB ENCOUNTER (OUTPATIENT)
Dept: LAB | Age: 49
End: 2024-12-18
Attending: INTERNAL MEDICINE
Payer: COMMERCIAL

## 2024-12-18 ENCOUNTER — PATIENT MESSAGE (OUTPATIENT)
Dept: INTERNAL MEDICINE CLINIC | Facility: CLINIC | Age: 49
End: 2024-12-18

## 2024-12-18 DIAGNOSIS — R73.9 BLOOD GLUCOSE ELEVATED: ICD-10-CM

## 2024-12-18 DIAGNOSIS — G43.109 MIGRAINE WITH AURA AND WITHOUT STATUS MIGRAINOSUS, NOT INTRACTABLE: ICD-10-CM

## 2024-12-18 DIAGNOSIS — Z51.81 ENCOUNTER FOR THERAPEUTIC DRUG MONITORING: ICD-10-CM

## 2024-12-18 DIAGNOSIS — E66.811 CLASS 1 OBESITY WITH SERIOUS COMORBIDITY AND BODY MASS INDEX (BMI) OF 31.0 TO 31.9 IN ADULT, UNSPECIFIED OBESITY TYPE: ICD-10-CM

## 2024-12-18 DIAGNOSIS — R73.03 PREDIABETES: ICD-10-CM

## 2024-12-18 DIAGNOSIS — G47.33 OSA ON CPAP: ICD-10-CM

## 2024-12-18 DIAGNOSIS — Z00.00 ROUTINE GENERAL MEDICAL EXAMINATION AT A HEALTH CARE FACILITY: ICD-10-CM

## 2024-12-18 DIAGNOSIS — E03.9 HYPOTHYROIDISM, UNSPECIFIED TYPE: ICD-10-CM

## 2024-12-18 DIAGNOSIS — E78.1 HYPERTRIGLYCERIDEMIA: ICD-10-CM

## 2024-12-18 DIAGNOSIS — E78.5 DYSLIPIDEMIA: ICD-10-CM

## 2024-12-18 DIAGNOSIS — Z13.0 SCREENING FOR BLOOD DISEASE: ICD-10-CM

## 2024-12-18 LAB
BASOPHILS # BLD AUTO: 0.07 X10(3) UL (ref 0–0.2)
BASOPHILS NFR BLD AUTO: 1 %
CHOLEST SERPL-MCNC: 219 MG/DL (ref ?–200)
EOSINOPHIL # BLD AUTO: 0.18 X10(3) UL (ref 0–0.7)
EOSINOPHIL NFR BLD AUTO: 2.7 %
ERYTHROCYTE [DISTWIDTH] IN BLOOD BY AUTOMATED COUNT: 12.5 %
EST. AVERAGE GLUCOSE BLD GHB EST-MCNC: 134 MG/DL (ref 68–126)
FASTING PATIENT LIPID ANSWER: YES
HBA1C MFR BLD: 6.3 % (ref ?–5.7)
HCT VFR BLD AUTO: 39.7 %
HDLC SERPL-MCNC: 60 MG/DL (ref 40–59)
HGB BLD-MCNC: 13 G/DL
IMM GRANULOCYTES # BLD AUTO: 0.03 X10(3) UL (ref 0–1)
IMM GRANULOCYTES NFR BLD: 0.4 %
LDLC SERPL CALC-MCNC: 127 MG/DL (ref ?–100)
LYMPHOCYTES # BLD AUTO: 2.2 X10(3) UL (ref 1–4)
LYMPHOCYTES NFR BLD AUTO: 33 %
MCH RBC QN AUTO: 28.1 PG (ref 26–34)
MCHC RBC AUTO-ENTMCNC: 32.7 G/DL (ref 31–37)
MCV RBC AUTO: 85.7 FL
MONOCYTES # BLD AUTO: 0.51 X10(3) UL (ref 0.1–1)
MONOCYTES NFR BLD AUTO: 7.6 %
NEUTROPHILS # BLD AUTO: 3.68 X10 (3) UL (ref 1.5–7.7)
NEUTROPHILS # BLD AUTO: 3.68 X10(3) UL (ref 1.5–7.7)
NEUTROPHILS NFR BLD AUTO: 55.3 %
NONHDLC SERPL-MCNC: 159 MG/DL (ref ?–130)
PLATELET # BLD AUTO: 425 10(3)UL (ref 150–450)
RBC # BLD AUTO: 4.63 X10(6)UL
TRIGL SERPL-MCNC: 183 MG/DL (ref 30–149)
VIT B12 SERPL-MCNC: 348 PG/ML (ref 211–911)
VIT D+METAB SERPL-MCNC: 7.3 NG/ML (ref 30–100)
VLDLC SERPL CALC-MCNC: 33 MG/DL (ref 0–30)
WBC # BLD AUTO: 6.7 X10(3) UL (ref 4–11)

## 2024-12-18 PROCEDURE — 80061 LIPID PANEL: CPT | Performed by: INTERNAL MEDICINE

## 2024-12-18 PROCEDURE — 85025 COMPLETE CBC W/AUTO DIFF WBC: CPT | Performed by: INTERNAL MEDICINE

## 2024-12-18 PROCEDURE — 82306 VITAMIN D 25 HYDROXY: CPT | Performed by: NURSE PRACTITIONER

## 2024-12-18 PROCEDURE — 83036 HEMOGLOBIN GLYCOSYLATED A1C: CPT | Performed by: INTERNAL MEDICINE

## 2024-12-18 PROCEDURE — 82607 VITAMIN B-12: CPT | Performed by: NURSE PRACTITIONER

## 2024-12-20 DIAGNOSIS — R79.89 LOW VITAMIN B12 LEVEL: ICD-10-CM

## 2024-12-20 DIAGNOSIS — E55.9 VITAMIN D DEFICIENCY: Primary | ICD-10-CM

## 2024-12-20 RX ORDER — ERGOCALCIFEROL 1.25 MG/1
50000 CAPSULE, LIQUID FILLED ORAL WEEKLY
Qty: 12 CAPSULE | Refills: 1 | Status: SHIPPED | OUTPATIENT
Start: 2024-12-20

## 2024-12-20 NOTE — TELEPHONE ENCOUNTER
CPM, topamax was increased at her LOV. No coverage for AOMs and no T2DM, so no Ozempic option. If interested in [facility] for Zepbound 2.5 mg weekly at $399/month we can discuss at next f/u. Continue current plan and review AVS from LOV. Thanks

## 2025-01-30 ENCOUNTER — TELEPHONE (OUTPATIENT)
Dept: INTERNAL MEDICINE CLINIC | Facility: CLINIC | Age: 50
End: 2025-01-30

## 2025-01-30 NOTE — TELEPHONE ENCOUNTER
Patient is having surgery is  on 2/25/25 ph [phone] and fax is [phone]   paperwork has been fax ans is in the brown folder

## 2025-02-04 ENCOUNTER — LAB ENCOUNTER (OUTPATIENT)
Dept: LAB | Age: 50
End: 2025-02-04
Attending: PLASTIC SURGERY
Payer: COMMERCIAL

## 2025-02-04 DIAGNOSIS — Z01.812 PRE-OPERATIVE LABORATORY EXAMINATION: Primary | ICD-10-CM

## 2025-02-04 LAB
ANION GAP SERPL CALC-SCNC: 11 MMOL/L (ref 0–18)
APTT PPP: 30.1 SECONDS (ref 23–36)
BASOPHILS # BLD AUTO: 0.06 X10(3) UL (ref 0–0.2)
BASOPHILS NFR BLD AUTO: 0.8 %
BUN BLD-MCNC: 7 MG/DL (ref 9–23)
CALCIUM BLD-MCNC: 10 MG/DL (ref 8.7–10.6)
CHLORIDE SERPL-SCNC: 105 MMOL/L (ref 98–112)
CO2 SERPL-SCNC: 24 MMOL/L (ref 21–32)
CREAT BLD-MCNC: 0.87 MG/DL
EGFRCR SERPLBLD CKD-EPI 2021: 83 ML/MIN/1.73M2 (ref 60–?)
EOSINOPHIL # BLD AUTO: 0.15 X10(3) UL (ref 0–0.7)
EOSINOPHIL NFR BLD AUTO: 1.9 %
ERYTHROCYTE [DISTWIDTH] IN BLOOD BY AUTOMATED COUNT: 12.6 %
FASTING STATUS PATIENT QL REPORTED: NO
GLUCOSE BLD-MCNC: 159 MG/DL (ref 70–99)
HCT VFR BLD AUTO: 41.2 %
HGB BLD-MCNC: 13.8 G/DL
IMM GRANULOCYTES # BLD AUTO: 0.04 X10(3) UL (ref 0–1)
IMM GRANULOCYTES NFR BLD: 0.5 %
INR BLD: 0.93 (ref 0.8–1.2)
LYMPHOCYTES # BLD AUTO: 2.64 X10(3) UL (ref 1–4)
LYMPHOCYTES NFR BLD AUTO: 33.6 %
MCH RBC QN AUTO: 29.1 PG (ref 26–34)
MCHC RBC AUTO-ENTMCNC: 33.5 G/DL (ref 31–37)
MCV RBC AUTO: 86.9 FL
MONOCYTES # BLD AUTO: 0.44 X10(3) UL (ref 0.1–1)
MONOCYTES NFR BLD AUTO: 5.6 %
NEUTROPHILS # BLD AUTO: 4.52 X10 (3) UL (ref 1.5–7.7)
NEUTROPHILS # BLD AUTO: 4.52 X10(3) UL (ref 1.5–7.7)
NEUTROPHILS NFR BLD AUTO: 57.6 %
OSMOLALITY SERPL CALC.SUM OF ELEC: 291 MOSM/KG (ref 275–295)
PLATELET # BLD AUTO: 436 10(3)UL (ref 150–450)
POTASSIUM SERPL-SCNC: 3.9 MMOL/L (ref 3.5–5.1)
PROTHROMBIN TIME: 12.6 SECONDS (ref 11.6–14.8)
RBC # BLD AUTO: 4.74 X10(6)UL
SODIUM SERPL-SCNC: 140 MMOL/L (ref 136–145)
TSI SER-ACNC: 1.9 UIU/ML (ref 0.55–4.78)
WBC # BLD AUTO: 7.9 X10(3) UL (ref 4–11)

## 2025-02-04 PROCEDURE — 36415 COLL VENOUS BLD VENIPUNCTURE: CPT

## 2025-02-04 PROCEDURE — 85610 PROTHROMBIN TIME: CPT

## 2025-02-04 PROCEDURE — 85730 THROMBOPLASTIN TIME PARTIAL: CPT

## 2025-02-04 PROCEDURE — 84443 ASSAY THYROID STIM HORMONE: CPT

## 2025-02-04 PROCEDURE — 80048 BASIC METABOLIC PNL TOTAL CA: CPT

## 2025-02-04 PROCEDURE — 85025 COMPLETE CBC W/AUTO DIFF WBC: CPT

## 2025-02-11 ENCOUNTER — HOSPITAL ENCOUNTER (OUTPATIENT)
Dept: MAMMOGRAPHY | Facility: HOSPITAL | Age: 50
Discharge: HOME | End: 2025-02-11
Attending: SURGERY
Payer: COMMERCIAL

## 2025-02-11 DIAGNOSIS — R92.8 ABNORMAL MAMMOGRAM OF LEFT BREAST: ICD-10-CM

## 2025-02-11 PROCEDURE — 77066 DX MAMMO INCL CAD BI: CPT | Performed by: SURGERY

## 2025-02-11 PROCEDURE — 76642 ULTRASOUND BREAST LIMITED: CPT | Performed by: SURGERY

## 2025-02-11 PROCEDURE — 77062 BREAST TOMOSYNTHESIS BI: CPT | Performed by: SURGERY

## 2025-02-11 NOTE — IMAGING NOTE
This Breast Care RN assisted Dr. Barnett with recommendation for a right breast 2 site ultrasound guided biopsy for masses.  Procedure reviewed and all questions answered. Emotional and educational support given.   On the day of the biopsy, pt instructed to take Tylenol 1000mg PO, eat a light meal & bring or wear a sports bra.  Post biopsy care also reviewed with pt to include NO lifting more than 5lbs, no exercising or housework (limit upper body movement) for 24-48 hrs post biopsy. Patient denies blood thinners, bleeding disorders, liver disease, and chemo.  Pt verbalized understanding. An appt is being held on Thursday 2-13-24 at 1030, arriving at 10

## 2025-02-12 ENCOUNTER — OFFICE VISIT (OUTPATIENT)
Dept: INTERNAL MEDICINE CLINIC | Facility: CLINIC | Age: 50
End: 2025-02-12
Payer: COMMERCIAL

## 2025-02-12 VITALS
HEART RATE: 71 BPM | HEIGHT: 64.5 IN | BODY MASS INDEX: 30.42 KG/M2 | SYSTOLIC BLOOD PRESSURE: 122 MMHG | TEMPERATURE: 98 F | OXYGEN SATURATION: 98 % | DIASTOLIC BLOOD PRESSURE: 80 MMHG | WEIGHT: 180.38 LBS

## 2025-02-12 DIAGNOSIS — G47.33 OSA ON CPAP: ICD-10-CM

## 2025-02-12 DIAGNOSIS — F32.A ANXIETY AND DEPRESSION: ICD-10-CM

## 2025-02-12 DIAGNOSIS — F41.9 ANXIETY AND DEPRESSION: ICD-10-CM

## 2025-02-12 DIAGNOSIS — Z01.818 PRE-OP EVALUATION: Primary | ICD-10-CM

## 2025-02-12 DIAGNOSIS — R73.03 PREDIABETES: ICD-10-CM

## 2025-02-12 DIAGNOSIS — E03.9 HYPOTHYROIDISM, UNSPECIFIED TYPE: ICD-10-CM

## 2025-02-12 DIAGNOSIS — E78.2 MIXED HYPERLIPIDEMIA: ICD-10-CM

## 2025-02-12 PROBLEM — Z51.81 ENCOUNTER FOR THERAPEUTIC DRUG MONITORING: Status: RESOLVED | Noted: 2023-09-26 | Resolved: 2025-02-12

## 2025-02-12 PROCEDURE — 3008F BODY MASS INDEX DOCD: CPT | Performed by: INTERNAL MEDICINE

## 2025-02-12 PROCEDURE — 3079F DIAST BP 80-89 MM HG: CPT | Performed by: INTERNAL MEDICINE

## 2025-02-12 PROCEDURE — 3074F SYST BP LT 130 MM HG: CPT | Performed by: INTERNAL MEDICINE

## 2025-02-12 PROCEDURE — 99214 OFFICE O/P EST MOD 30 MIN: CPT | Performed by: INTERNAL MEDICINE

## 2025-02-12 NOTE — PROGRESS NOTES
Lorelei Clemente is a 49 year old female     HPI:   The patient has a planned abdominoplasty, liposuction and fat grafting with Dr. Montgomery on 2/25/2025.     Revised Cardiac Risk Index (modified Dover Index): 0 points    How many METS can the patient achieve? The patient is able to achieve well over 4 METS.    Complications with anesthesia in the past?:  No      Current Outpatient Medications   Medication Sig Dispense Refill    ergocalciferol 1.25 MG (95744 UT) Oral Cap Take 1 capsule (50,000 Units total) by mouth once a week. With food 12 capsule 1    topiramate 50 MG Oral Tab Take 1 tablet (50 mg total) by mouth 2 (two) times daily. 180 tablet 1    Phentermine HCl 15 MG Oral Cap Take 1 capsule (15 mg total) by mouth every morning. 30 capsule 3    levothyroxine 75 MCG Oral Tab Take 1 tablet (75 mcg total) by mouth before breakfast. 90 tablet 3    ALPRAZolam (XANAX) 0.25 MG Oral Tab Take 1 tablet (0.25 mg total) by mouth nightly as needed. 10 tablet 0    diclofenac (VOLTAREN) 1 % External Gel Apply 4 g topically every 6 (six) hours as needed. 1 each 2    buPROPion  MG Oral Tablet 24 Hr Take 1 tablet (300 mg total) by mouth daily. 90 tablet 3    ondansetron (ZOFRAN) 4 mg tablet Take 1 tablet (4 mg total) by mouth every 8 (eight) hours as needed for Nausea. 30 tablet 1    SUMAtriptan 25 MG Oral Tab TAKE 1 TABLET BY MOUTH AS NEEDED FOR MIGRAINE. MAY REPEAT IN 2 HOURS IF NEEDED(MAX 2 TABLETS PER DAY) AS DIRECTED 9 tablet 1    naproxen 500 MG Oral Tab Take 1 tablet (500 mg total) by mouth 2 (two) times daily as needed. 60 tablet 1      Allergies: Allergies[1]   Past Medical History:    Anxiety    Decorative tattoo    Depression    Disorder of thyroid    History of gestational diabetes in prior pregnancy, currently pregnant (HCC)    HPV in female    Migraine    Migraines    Prediabetes    Sterilization    [facility]    Wears glasses      Past Surgical History:   Procedure Laterality Date    Cholecystectomy   2004    Hysterectomy      partial hyst    Hysteroscopy, sterilization  2011    [facility]    Lino localization wire 1 site left (cpt=19281) Left 2024    2 site, BN    Needle biopsy left Left 2024    2 site, radial scar          x3    Tonsillectomy  2010      Family History   Problem Relation Age of Onset    Other (Other) Mother         thyroid disease    Diabetes Mother     Hypertension Mother     Lipids Mother     Diabetes Maternal Grandmother     Hypertension Maternal Grandmother     Heart Attack Maternal Grandfather     Diabetes Maternal Grandfather     Hypertension Maternal Grandfather     Heart Disorder Maternal Grandfather         heart attack    Diabetes Paternal Grandmother     Diabetes Paternal Grandfather     Cancer Maternal Aunt 57        lung cancer      Social History:   Social History     Socioeconomic History    Marital status:    Tobacco Use    Smoking status: Never    Smokeless tobacco: Never   Vaping Use    Vaping status: Never Used   Substance and Sexual Activity    Alcohol use: No    Drug use: No    Sexual activity: Yes     Partners: Male     Comment: [facility]   Social History Narrative    ** Merged History Encounter **                REVIEW OF SYSTEMS:   GENERAL: feels well otherwise  SKIN: denies any unusual skin lesions  EYES:denies blurred vision or double vision  HEENT: denies nasal congestion, rhinorrhea, pharyngitis  LUNGS: denies shortness of breath with exertion  CARDIOVASCULAR: denies chest pain on exertion  GI: denies abdominal pain, denies change in bowel habits  MUSCULOSKELETAL: denies back pain  NEURO: denies headaches recently, does have history of migraines    EXAM:   /80 (BP Location: Left arm, Patient Position: Sitting, Cuff Size: adult)   Pulse 71   Temp 97.5 °F (36.4 °C) (Tympanic)   Ht 5' 4.5\" (1.638 m)   Wt 180 lb 6.4 oz (81.8 kg)   LMP 2012 (LMP Unknown)   SpO2 98%   BMI 30.49 kg/m²   Constitutional: Oriented to person,  place, and time. No distress.  Oropharynx is clear and moist.   Neck: Full ROM.  Neck supple.  No thyromegaly. No carotid bruits.  Cardiovascular: Normal rate, regular rhythm and intact distal pulses.  No murmur, rubs or gallops.   Pulmonary/Chest: Effort normal and breath sounds normal. No respiratory distress.  Abdominal: Soft. Bowel sounds are normal. Non tender, no masses, no organomegaly or hernias.  Musculoskeletal: No edema in bilateral lower extremities.  ASSESSMENT AND PLAN:   Lorelei Clemente is a 49 year old female who presents for a pre-operative physical exam. The patient has a planned abdominoplasty, liposuction and fat grafting with Dr. Montgomery on 2/25/2025.  She has a history of migraines, hyperlipidemia, prediabetes, hypothyroidism, anxiety, depression, and BENITA. She is at acceptable risk for surgical procedure. CBC, BMP, PT/PTT reviewed. She is aware to hold NSAID for 1 week and was already instructed to hold Topiramate for 2  weeks prior to OR. Given history of BENITA her respiratory status should be monitored closely.       ICD-10-CM    1. Pre-op evaluation  Z01.818       2. BENITA on CPAP  G47.33 Continue nightly CPAP.      3. Mixed hyperlipidemia  E78.2 Continue to manage with lifestyle modifications for now.      4. Prediabetes  R73.03 Continue to manage with lifestyle modifications for now. Last A1c 6.3%.      5. Hypothyroidism, unspecified type  E03.9 Continue current dose of Levothyroxine 75 mcg.      6. Anxiety and depression  F41.9 Continue Bupropion  mg.    F32.A                  [1]   Allergies  Allergen Reactions    Shrimp TONGUE SWELLING

## 2025-02-13 ENCOUNTER — HOSPITAL ENCOUNTER (OUTPATIENT)
Dept: MAMMOGRAPHY | Facility: HOSPITAL | Age: 50
Discharge: HOME | End: 2025-02-13
Attending: SURGERY
Payer: COMMERCIAL

## 2025-02-13 DIAGNOSIS — N63.0 BREAST MASS: ICD-10-CM

## 2025-02-13 DIAGNOSIS — N63.10 MASS OF RIGHT BREAST: ICD-10-CM

## 2025-02-13 DIAGNOSIS — N63.10 MASS OF BREAST, RIGHT: ICD-10-CM

## 2025-02-13 DIAGNOSIS — R92.8 ABNORMAL MAMMOGRAM: ICD-10-CM

## 2025-02-13 PROCEDURE — 88305 TISSUE EXAM BY PATHOLOGIST: CPT | Performed by: SURGERY

## 2025-02-13 PROCEDURE — 77065 DX MAMMO INCL CAD UNI: CPT | Performed by: SURGERY

## 2025-02-13 PROCEDURE — 19083 BX BREAST 1ST LESION US IMAG: CPT | Performed by: SURGERY

## 2025-02-13 PROCEDURE — 19084 BX BREAST ADD LESION US IMAG: CPT | Performed by: SURGERY

## 2025-02-13 NOTE — DISCHARGE INSTRUCTIONS
Discharge Instructions-Ordering Provider  Stereotactic / Ultrasound / MRI Core Biopsy       Place an ice pack on top of the biopsy site in your bra OR the folds of the Ace wrap for 10-15 minutes every hour until bedtime for your comfort and to decrease bleeding.     Keep your supportive bra OR the Ace wrap in place for 24 hours after your biopsy. This compression decreases bleeding and breast movement for your comfort. Wear a supportive bra for the next couple days for comfort (as well as for sleeping)     No bath or shower during the first 24 hours after biopsy. After this time, you may take a bath or shower. It's okay if the steri-strips get wet but don't soak them.   No saunas, hot tubs, or swimming pools until the steri-strips fall off (5-7days).  This prevents infection and allows time for the area to completely close and heal.     DO NOT remove the steri-strips. They will fall off in 5 days to two weeks. If any type of irritation (redness, itching, OR blisters) develops in the area around the steri-strips, remove them gently. Keep the area clean and dry.     It is normal to have mild discomfort and bruising at the biopsy site.      You may take Tylenol as needed for discomfort.     DO NOT participate in strenuous activity (aerobics, heavy lifting, housework, gardening, etc.) 48 hours after your biopsy to prevent bleeding.     You will receive results from your ordering provider. Please contact them with any questions.    If you have any questions about the procedure, please contact the Breast Care Coordinator Nurse at [phone]. (M-F 7:30-4)    If you are having a MRI Breast Biopsy or an Ultrasound guided biopsy, you will be billed for the biopsy and a unilateral mammogram separately.    A 6-month or one year follow-up Diagnostic Mammogram/Ultrasound will be recommended to document stability of the biopsy site. It may be scheduled at [facility] or [facility] by calling [phone].  You will need an order for this exam from your referring physician.       5/2024

## 2025-02-17 ENCOUNTER — PATIENT MESSAGE (OUTPATIENT)
Facility: CLINIC | Age: 50
End: 2025-02-17

## 2025-02-18 DIAGNOSIS — E03.8 OTHER SPECIFIED HYPOTHYROIDISM: ICD-10-CM

## 2025-02-20 RX ORDER — ONDANSETRON 4 MG/1
4 TABLET, FILM COATED ORAL EVERY 8 HOURS PRN
Qty: 30 TABLET | Refills: 1 | Status: SHIPPED | OUTPATIENT
Start: 2025-02-20

## 2025-02-20 RX ORDER — ONDANSETRON 4 MG/1
4 TABLET, FILM COATED ORAL EVERY 8 HOURS PRN
Qty: 30 TABLET | Refills: 1 | OUTPATIENT
Start: 2025-02-20

## 2025-02-20 NOTE — TELEPHONE ENCOUNTER
Please review.  Protocol failed / Has no protocol.     Requested Prescriptions   Pending Prescriptions Disp Refills    ondansetron (ZOFRAN) 4 mg tablet [Pharmacy Med Name: ONDANSETRON 4MG TABLETS] 30 tablet 1     Sig: TAKE 1 TABLET(4 MG) BY MOUTH EVERY 8 HOURS AS NEEDED FOR NAUSEA       There is no refill protocol information for this order

## 2025-02-24 RX ORDER — LEVOTHYROXINE SODIUM 50 UG/1
50 TABLET ORAL
Qty: 90 TABLET | Refills: 1 | OUTPATIENT
Start: 2025-02-24

## 2025-02-24 NOTE — TELEPHONE ENCOUNTER
Year supply of refills good until 11/2025    Outpatient Medication Detail   Disp Refills Start End    levothyroxine 75 MCG Oral Tab 90 tablet 3 11/5/2024 --    Sig - Route: Take 1 tablet (75 mcg total) by mouth before breakfast. - Oral    Sent to pharmacy as: Levothyroxine Sodium 75 MCG Oral Tablet (Synthroid)    E-Prescribing Status: Receipt confirmed by pharmacy (11/5/2024  2:57 PM CST)    Pharmacy  [id number] [place] [id number] AT [facility] [address], [phone], [phone]

## 2025-03-18 ENCOUNTER — TELEPHONE (OUTPATIENT)
Dept: SURGERY | Facility: CLINIC | Age: 50
End: 2025-03-18

## 2025-03-18 ENCOUNTER — OFFICE VISIT (OUTPATIENT)
Dept: SURGERY | Facility: CLINIC | Age: 50
End: 2025-03-18
Payer: COMMERCIAL

## 2025-03-18 VITALS
RESPIRATION RATE: 16 BRPM | HEART RATE: 93 BPM | WEIGHT: 174 LBS | BODY MASS INDEX: 29 KG/M2 | OXYGEN SATURATION: 100 % | TEMPERATURE: 98 F | DIASTOLIC BLOOD PRESSURE: 69 MMHG | SYSTOLIC BLOOD PRESSURE: 123 MMHG

## 2025-03-18 DIAGNOSIS — N63.10 MASS OF RIGHT BREAST, UNSPECIFIED QUADRANT: Primary | ICD-10-CM

## 2025-03-18 PROCEDURE — 99215 OFFICE O/P EST HI 40 MIN: CPT | Performed by: SURGERY

## 2025-03-18 PROCEDURE — 3078F DIAST BP <80 MM HG: CPT | Performed by: SURGERY

## 2025-03-18 PROCEDURE — 3074F SYST BP LT 130 MM HG: CPT | Performed by: SURGERY

## 2025-03-18 NOTE — PATIENT INSTRUCTIONS
Dr. Blanca Martell  Tel: [phone]  Fax: [phone]      Surgery/Procedure: Right breast wire localized excisional biopsy       Anesthesia:   MAC  Surgery Length:   45 minutes CPT:  43540   Wire LOC:   Yes Nuc Med:   No   Nelli Seed:  No       Dx & ICD-10: Mass of right breast, unspecified quadrant (N63.10)   Radiology Instructions: right breast, 11 o'clock position, 8 cm from the nipple, Q shaped clip, biopsy confirms fibroepithelial lesion    _______________________________________________________________________________    Someone must accompany you the day of the procedure to drive you home safely, because of anesthesia.  You will need an adult  to stay with you the first night following your surgery.  You must remove any kind of makeup, acrylic nails, lotions, powders, creams or deodorant.  [facility] ONLY: Pre-admission will give instruct you on when to take Gatorade and Tylenol/acetaminophen prior to your surgery, purchase 2 - 12oz bottles of regular Gatorade (NOT RED/SUGAR FREE). Otherwise, you may not eat or drink anything else after 11PM the night before surgery.    [facility] ONLY: You may not eat or drink anything after midnight the day of your surgery.   Wear comfortable clothing that can be easily removed.  If you wear dentures, contacts lenses, or any prosthesis, you will be asked to remove them.  Do not drink alcohol or smoke 24 hours prior to your procedure.  Bring a picture ID and your insurance card.  Covid-19 testing is no longer required before surgery unless you are experiencing symptoms such as fever, cough, congestion, etc.   The Pre-Admission Testing Department will call the day before to confirm your procedure, give you the time you need to arrive by and directions on where to go. They begin making calls after 2pm, if you are not contacted by 4pm, please call the surgeon's office listed above.  Do not take any blood thinners at least one week prior to the  procedure/surgery. This includes aspirin, baby aspirin, Ibuprofen products, herbal supplements, diet medications, vitamin E, fish oil and green tea supplements. Please check other supplements for these ingredients. *TYLENOL or acetaminophen is acceptable*  If you take Coumadin, Plavix, Xarelto, or Eliquis, please contact your prescribing physician for special instructions on how long to hold. If you take insulin contact your primary care physician for special instructions.  Our surgery scheduler, Alyce, will be contacting you to discuss surgery dates. If you have any questions related to scheduling your surgery, please reach out to her at [phone].  _____________________________________________________________________  PRE-OPERATIVE TESTING IF INDICATED BELOW  PLEASE COMPLETE ASAP (AT LEAST 14-21 DAYS PRIOR TO SURGERY)  [] CBC [] BMP [] CMP [] EKG    [] PT, PTT, INR [] Cardiac Clearance  [] H&P Medical Clearance [] Chest X-ray     Please call Central Scheduling to schedule an appointment for pre-operative labs/tests once your orders are placed @ [phone]  Does the patient have a pacemaker or ICD?                              Faxitron/Trident in OR?  [] Yes   [x] No                               [] Yes   [x] No

## 2025-03-20 ENCOUNTER — TELEPHONE (OUTPATIENT)
Dept: SURGERY | Facility: CLINIC | Age: 50
End: 2025-03-20

## 2025-03-20 ENCOUNTER — TELEPHONE (OUTPATIENT)
Dept: INTERNAL MEDICINE CLINIC | Facility: CLINIC | Age: 50
End: 2025-03-20

## 2025-03-20 DIAGNOSIS — N63.10 MASS OF RIGHT BREAST, UNSPECIFIED QUADRANT: Primary | ICD-10-CM

## 2025-03-20 DIAGNOSIS — E03.8 OTHER SPECIFIED HYPOTHYROIDISM: Primary | ICD-10-CM

## 2025-03-20 DIAGNOSIS — Z00.00 ROUTINE GENERAL MEDICAL EXAMINATION AT A HEALTH CARE FACILITY: ICD-10-CM

## 2025-03-20 NOTE — TELEPHONE ENCOUNTER
Future Appointments   Date Time Provider Department Center   7/26/2025 10:00 AM Fozia Thibodeaux MD [facility]     Orders to [facility]     Pt informed that labs need to be completed no sooner than 2 weeks prior to the appt. Pt aware to fast-no call back required

## 2025-03-20 NOTE — TELEPHONE ENCOUNTER
Calling pt in regards to scheduling surgery.  Informed pt that I have 05/12/2025 available at [facility] with Dr. Martell  Pt verbalized understanding and in agreement with date and location.  All questions answered.   Encouraged pt to call or [facility] message office with any other questions or concerns.

## 2025-03-24 PROBLEM — N63.10 MASS OF RIGHT BREAST: Status: ACTIVE | Noted: 2025-03-24

## 2025-03-24 NOTE — PROGRESS NOTES
Breast Surgery Surveillance Visit    Diagnosis: Left breast, radial scar/papilloma, s/p excisional biopsy on 3/7/2024    Stage: N/A    Disease Status:  Surgical treatment complete, no further treatment pending    History of Present Illness:   Ms. Lorelei Clemente is a 49 year old woman who presents with imaging detected concerns of the left breast.  The patient denies any self detected clinical symptoms including palpable masses, nipple discharge, skin changes or axillary symptoms.  She does not have any known family history of breast cancer.  She has no personal prior history of breast disease or biopsies.  She had her screening mammogram January 4, 2024 and was found to have new asymmetry found in her breast bilaterally.  She had a bilateral diagnostic evaluation on January 9, 2024 and was found to have 2 areas at 7:00 in the left breast correspond to irregular nodules measuring 1 cm and 5 mm respectively which were determined indeterminant for which biopsies were recommended with otherwise benign appearing findings.  The 2 site left breast ultrasound-guided biopsy took place on January 12, 2024 and confirmed fragments of radial scar at both locations with an additional finding of papilloma at one of the sites. She underwent left breast excisional biopsy, which occurred without complication. She healed well after surgery but did develop a recent finding on her right breast imaging.  Her bilateral diagnostic evaluation in February 2025 demonstrated 2 lesions in the right breast at 10:00 and 11:00 for which biopsies were recommended with postsurgical changes of the left breast.  The biopsies took place on February 13, 2025 and at the 10 o'clock position she was found have a benign fibroadenoma and at the 11 o'clock position a fibroepithelial lesion. She is here today for evaluation and recommendations for further therapy.        Past Medical History:    Anxiety    Decorative tattoo    Depression    Disorder  of thyroid    History of gestational diabetes in prior pregnancy, currently pregnant (HCC)    HPV in female    Migraine    Migraines    Prediabetes    Sterilization    Essure    Wears glasses       Past Surgical History:   Procedure Laterality Date    Cholecystectomy  2004    Hysterectomy      partial hyst    Hysteroscopy, sterilization  2011    Essure    Lino localization wire 1 site left (cpt=19281) Left 2024    2 site, BN    Needle biopsy left Left 2024    2 site, radial scar          x3    Tonsillectomy  2010       Gynecological History:  Pt is a   Pt was 24 years old at time of first pregnancy.    She has cumulative breastfeeding history of 8 months.  She achieved menarche at age 15 and LMP 10/1/2016  Age of Menopause: 40  Type: hysterectomy with ovaries left in  She denies any history of hormone replacement therapy   She has history of oral contraceptive use for 2 years, last in .  She denies infertility treatment to achieve pregnancy.    Medications:     ondansetron (ZOFRAN) 4 mg tablet Take 1 tablet (4 mg total) by mouth every 8 (eight) hours as needed for Nausea. 30 tablet 1    ergocalciferol 1.25 MG (30510 UT) Oral Cap Take 1 capsule (50,000 Units total) by mouth once a week. With food 12 capsule 1    topiramate 50 MG Oral Tab Take 1 tablet (50 mg total) by mouth 2 (two) times daily. 180 tablet 1    Phentermine HCl 15 MG Oral Cap Take 1 capsule (15 mg total) by mouth every morning. 30 capsule 3    levothyroxine 75 MCG Oral Tab Take 1 tablet (75 mcg total) by mouth before breakfast. 90 tablet 3    ALPRAZolam (XANAX) 0.25 MG Oral Tab Take 1 tablet (0.25 mg total) by mouth nightly as needed. 10 tablet 0    diclofenac (VOLTAREN) 1 % External Gel Apply 4 g topically every 6 (six) hours as needed. 1 each 2    buPROPion  MG Oral Tablet 24 Hr Take 1 tablet (300 mg total) by mouth daily. 90 tablet 3    SUMAtriptan 25 MG Oral Tab TAKE 1 TABLET BY MOUTH AS NEEDED FOR  MIGRAINE. MAY REPEAT IN 2 HOURS IF NEEDED(MAX 2 TABLETS PER DAY) AS DIRECTED 9 tablet 1       Allergies:    Allergies   Allergen Reactions    Shrimp TONGUE SWELLING       Family History:   Family History   Problem Relation Age of Onset    Other (Other) Mother         thyroid disease    Diabetes Mother     Hypertension Mother     Lipids Mother     Diabetes Maternal Grandmother     Hypertension Maternal Grandmother     Heart Attack Maternal Grandfather     Diabetes Maternal Grandfather     Hypertension Maternal Grandfather     Heart Disorder Maternal Grandfather         heart attack    Diabetes Paternal Grandmother     Diabetes Paternal Grandfather     Cancer Maternal Aunt 57        lung cancer       She is not of Ashkenazi Religion ancestry.    Social History:  History   Alcohol Use No       History   Smoking Status    Never   Smokeless Tobacco    Never     Ms. Lorelei Clemente is  with 4 children. She has 7 siblings. She is currently Employed full-time    Review of Systems:  General:   The patient denies, fever, chills, night sweats, fatigue, generalized weakness, change in appetite or weight loss.    HEENT:     The patient denies eye irritation, cataracts, redness, glaucoma, yellowing of the eyes, change in vision, color blindness, or +wearing contacts/glasses. The patient denies hearing loss, ringing in the ears, ear drainage, earaches, nasal congestion, nose bleeds, +snoring, pain in mouth/throat, hoarseness, change in voice, facial trauma.    Respiratory:  The patient denies chronic cough, phlegm, hemoptysis, pleurisy/chest pain, pneumonia, asthma, wheezing, difficulty in breathing with exertion, emphysema, chronic bronchitis, shortness of breath or abnormal sound when breathing.     Cardiovascular:  There is no history of chest pain, chest pressure/discomfort, palpitations, irregular heartbeat, fainting or near-fainting, difficulty breathing when lying flat, SOB/Coughing at night, swelling of the  legs or chest pain while walking.    Breasts:  See history of present illness    Gastrointestinal:     There is no history of difficulty or pain with swallowing, reflux symptoms, vomiting, dark or bloody stools, constipation, yellowing of the skin, indigestion, nausea, change in bowel habits, diarrhea, abdominal pain or vomiting blood.     Genitourinary:  The patient denies frequent urination, needing to get up at night to urinate, urinary hesitancy or retaining urine, painful urination, urinary incontinence, decreased urine stream, blood in the urine or vaginal/penile discharge.    Skin:    The patient denies rash, itching, skin lesions, dry skin, change in skin color or change in moles.     Hematologic/Lymphatic:  The patient denies easily bruising or bleeding or persistent swollen glands or lymph nodes.     Musculoskeletal:  The patient denies muscle aches/pain, joint pain, stiff joints, neck pain, back pain or bone pain.    Neuropsychiatric:  There is no history of migraines or severe headaches, seizure/epilepsy, speech problems, coordination problems, trembling/tremors, fainting/black outs, dizziness, memory problems, loss of sensation/numbness, problems walking, weakness, tingling or burning in hands/feet. There is no history of abusive relationship, bipolar disorder, sleep disturbance, anxiety, depression or feeling of despair.    Endocrine:    There is no history of poor/slow wound healing, weight loss/gain, fertility or hormone problems, cold intolerance, +thyroid disease.     Allergic/Immunologic:  There is no history of hives, hay fever, angioedema or anaphylaxis.    /69 (BP Location: Left arm, Patient Position: Sitting, Cuff Size: adult)   Pulse 93   Temp 98.1 °F (36.7 °C) (Temporal)   Resp 16   Wt 78.9 kg (174 lb)   LMP 05/28/2012 (LMP Unknown)   SpO2 100%   BMI 29.41 kg/m²     Physical Exam:  The patient is an alert, oriented, well-nourished and  well-developed woman who appears her stated  age. Her speech patterns and movements are normal. Her affect is appropriate.    HEENT: The head is normocephalic. The neck is supple. The thyroid is not enlarged and is without palpable masses/nodules. There are no palpable masses. The trachea is in the midline. Conjunctiva are clear, non-icteric.    Chest: The chest expands symmetrically. The lungs are clear to auscultation.    Heart: The rhythm is regular.  There are no murmurs, rubs, gallops or thrills.    Breasts:  Her breasts are symmetrical with a cup size 38D.  Right breast: The skin, nipple ,and areola appear normal. There is no skin dimpling with movement of the pectoralis. There is no nipple retraction. No nipple discharge can be elicited. The parenchyma is mildly nodular. There are no dominant masses in the breast. The axillary tail is normal.  Left breast:   The skin, nipple, and areola appear normal. There is no skin dimpling with movement of the pectoralis. There is no nipple retraction. No nipple discharge can be elicited. The parenchyma is mildly nodular. There are no dominant masses in the breast. The axillary tail is normal.  There is a well-healed incision with no signs of clinical concern.    Abdomen:  The abdomen is soft, flat and non tender. The liver is not enlarged. There are no palpable masses.    Lymph Nodes:  The supraclavicular, axillary and cervical regions are free of significant lymphadenopathy.    Back: There is no vertebral column tenderness.    Skin: The skin appears normal. There are no suspicious appearing rashes or lesions.    Extremities: The extremities are without deformity, cyanosis or edema.    Impression:   Ms. Lorelei Clemente is a 49 year old woman presents with biopsy confirmed left breast radial scar and papilloma, s/p excisional biopsy with benign pathology new diagnosis of right breast fibroepithelial lesion.    Recommendations:   I had a discussion with the Patient regarding her breast exam.  She is healing  well since surgery with no signs of concern in the left breast.  I personally reviewed the recent imaging and pathology from the recent biopsy on the right breast.  We discussed the significance and implications of a fibroepithelial lesion including the inability to distinguish this from a fibroadenoma versus a benign phyllodes tumor and I have recommended a localized surgical excision to exclude any problems in this location.  This will require wire localization as it is not palpable.  The risks and possible complications of procedure were discussed at length with the patient and she agreed to proceed.  She was given ample opportunity for questions and those questions were answered to her satisfaction. She was encouraged to contact the office with any questions or concerns prior to her next scheduled appointment.

## 2025-04-04 ENCOUNTER — TELEPHONE (OUTPATIENT)
Facility: CLINIC | Age: 50
End: 2025-04-04

## 2025-04-08 ENCOUNTER — TELEPHONE (OUTPATIENT)
Dept: GENERAL RADIOLOGY | Facility: HOSPITAL | Age: 50
End: 2025-04-08

## 2025-04-08 NOTE — TELEPHONE ENCOUNTER
1050: Lorelei Clemente returned this nurse's call.  Introduced myself as one of the breast nurse coordinators at [facility] and informed Ms. Esteban Clemente of the purpose of my earlier call.  Discussed wire localization procedure to be done in the [facility] imaging center prior to surgery with Dr. Martell on Monday, May 12.   Procedure and flow of the day reviewed. Ms. Esteban Clemente shared that she previously had a wire localization procedure.. Ms. Esteban Clemente without questions at this time.  Encouraged Lorelei Clemente to contact the breast center or Dr. Martell's office if she has questions or concerns prior to her surgery.  Ms. Esteban Clemente verbalized agreement and appreciation for the information.

## 2025-04-16 ENCOUNTER — OFFICE VISIT (OUTPATIENT)
Dept: INTERNAL MEDICINE CLINIC | Facility: CLINIC | Age: 50
End: 2025-04-16
Payer: COMMERCIAL

## 2025-04-16 VITALS
DIASTOLIC BLOOD PRESSURE: 74 MMHG | HEART RATE: 75 BPM | WEIGHT: 168 LBS | BODY MASS INDEX: 28.33 KG/M2 | RESPIRATION RATE: 16 BRPM | OXYGEN SATURATION: 97 % | SYSTOLIC BLOOD PRESSURE: 106 MMHG | HEIGHT: 64.5 IN

## 2025-04-16 DIAGNOSIS — E66.3 OVERWEIGHT (BMI 25.0-29.9): ICD-10-CM

## 2025-04-16 DIAGNOSIS — G47.33 OSA (OBSTRUCTIVE SLEEP APNEA): ICD-10-CM

## 2025-04-16 DIAGNOSIS — Z86.39 HISTORY OF OBESITY: ICD-10-CM

## 2025-04-16 DIAGNOSIS — G43.109 MIGRAINE WITH AURA AND WITHOUT STATUS MIGRAINOSUS, NOT INTRACTABLE: ICD-10-CM

## 2025-04-16 DIAGNOSIS — Z51.81 ENCOUNTER FOR THERAPEUTIC DRUG MONITORING: Primary | ICD-10-CM

## 2025-04-16 PROCEDURE — 99214 OFFICE O/P EST MOD 30 MIN: CPT | Performed by: NURSE PRACTITIONER

## 2025-04-16 PROCEDURE — 3078F DIAST BP <80 MM HG: CPT | Performed by: NURSE PRACTITIONER

## 2025-04-16 PROCEDURE — 3074F SYST BP LT 130 MM HG: CPT | Performed by: NURSE PRACTITIONER

## 2025-04-16 PROCEDURE — 3008F BODY MASS INDEX DOCD: CPT | Performed by: NURSE PRACTITIONER

## 2025-04-16 RX ORDER — OXYCODONE AND ACETAMINOPHEN 5; 325 MG/1; MG/1
1 TABLET ORAL EVERY 6 HOURS PRN
COMMUNITY
Start: 2025-02-21

## 2025-04-16 RX ORDER — SULFAMETHOXAZOLE AND TRIMETHOPRIM 800; 160 MG/1; MG/1
1 TABLET ORAL 2 TIMES DAILY
COMMUNITY
Start: 2025-03-11

## 2025-04-16 RX ORDER — ONDANSETRON 4 MG/1
4 TABLET, ORALLY DISINTEGRATING ORAL EVERY 6 HOURS PRN
COMMUNITY
Start: 2025-02-21

## 2025-04-16 RX ORDER — PHENTERMINE HYDROCHLORIDE 15 MG/1
15 CAPSULE ORAL EVERY MORNING
Qty: 30 CAPSULE | Refills: 3 | Status: SHIPPED | OUTPATIENT
Start: 2025-04-16

## 2025-04-16 RX ORDER — TOPIRAMATE 100 MG/1
100 TABLET, FILM COATED ORAL 2 TIMES DAILY
Qty: 60 TABLET | Refills: 5 | Status: SHIPPED | OUTPATIENT
Start: 2025-04-16

## 2025-04-16 NOTE — PROGRESS NOTES
Lorelei Clemente is a 49 year old female presents today for follow-up on medical weight loss program for the treatment of overweight, obesity, or morbid obesity with associated prediabetes, BENITA, Dyslipidemia, Migraine.    S:  Current weight   Wt Readings from Last 6 Encounters:   04/16/25 168 lb (76.2 kg)   03/18/25 174 lb (78.9 kg)   02/12/25 180 lb 6.4 oz (81.8 kg)   08/14/24 191 lb (86.6 kg)   08/05/24 194 lb (88 kg)   07/17/24 194 lb 9.6 oz (88.3 kg)    AND BMI Body mass index is 28.39 kg/m²..    Patient has lost 23# since LOV 3 month ago via VV and in clinic as NP consult in 8/2024.  She has been compliant with therapy. Has noted some mild constipation not responding to OTC colace. Feeling at a weight plateau for the past couple of months. Had abdominoplasty in 1/2025. Currently on exercise restrictions. Will need right breast lump removal in 5/2025. Increased stress with anticipation of this procedure. Hx of stress eating which she has not gone to during this time. Hx of mild BENITA and not using CPAP often because of less snoring and feeling more rested.    Testing/consult completed since LOV: Dietician: no- not a covered service by insurance.    Social hx and PMH reviewed. Employed in [employer].  with 4 children.    [facility] Medical Weight Loss Follow Up    Question 4/9/2025 10:45 AM CDT - Filed by Patient   Please describe a success moment: I lost about 10 pounds after my first visit   Please describe a challenging moment/needs for improvement: Regain weight during winter.   Please complete this 24 hour food journal, listing everything you had to eat in the past day. Include the average time of day you ate these meals at    List foods, qty and prep for breakfast: 1 cup yogurt, half bagel 1 egg   List foods, qty and prep for lunch. Bowl of soup, or salad. Sometimes one Quesadilla or half a sandwich   List foods, qty and prep for dinner. One serving of vegetables one serving of protein and a  1/4 serving of carbs or starch food   List foods, qty and prep for snacks. 1 apple, or 1 avocado, or pineapple 5 to 6 slices. 1 serving of jicama , or cucumbers. Few marshmallows. Chips and guacamole   List the types and qty of fluids consumed Cup of coffe, daily. 2 to 3 bottles of water, daily. 1 soda or cup or juice per week.per week   On average, how many meals did you eat out per week? 1   Exercise    How many days per week are you active or exercise 5   On average, how many days were anaerobic (strength/resistance) exercises performed? 3   On average, how many days were aerobic (cardio) exercises performed? 2   Perceived level of exertion on a scale of 1-5, with 5 being very intense: 2   Stress    Average stress level on a scale of 1-10, with 10 being extremely stressed: 7   If greater than 5/1O how would you grade your coping mechanisms? moderate   Sleep hours and integrity    How many hours of uninterrupted sleep do you get a night: 7   Do you feel rested in the morning: Yes   If no, what may have been disrupting your sleep?    Please list any goal(s) for your next visit Plan to get to an ideal BMI weight and maintain it.     REVIEW OF SYSTEMS:  GENERAL: feels well otherwise  LUNGS: denies shortness of breath with exertion  CARDIOVASCULAR: denies chest pain on exertion, denies palpitations or pedal edema  GI: denies abdominal pain.  No N/V/D, see above  MUSCULOSKELETAL: no acute joint or muscle pain  NEURO: denies headaches. Migraines controlled.  PSYCH: denies change in behavior or mood, denies feeling sad or depressed    EXAM:  /74   Pulse 75   Resp 16   Ht 5' 4.5\" (1.638 m)   Wt 168 lb (76.2 kg)   LMP 05/28/2012 (LMP Unknown)   SpO2 97%   BMI 28.39 kg/m²    GENERAL: well developed, well nourished, in no apparent distress, overweight  EYES: conjunctiva pink, sclera non icteric, PERRLA  LUNGS: CTA in all fields, breathing non labored  CARDIO: RRR without murmur, normal S1 and S2 without clicks  or gallops, no pedal edema.  GI: +BS  NEURO/MS: motor and sensory grossly intact  PSYCH: pleasant, cooperative, normal mood and affect    ASSESSMENT AND PLAN:  Reviewed Initial Weight Data and Goal Weight Loss:       Encounter Diagnoses   Name Primary?    Encounter for therapeutic drug monitoring Yes    Overweight (BMI 25.0-29.9)     BENITA (obstructive sleep apnea)     Migraine with aura and without status migrainosus, not intractable     History of obesity        No orders of the defined types were placed in this encounter.      Meds & Refills for this Visit:  Requested Prescriptions     Signed Prescriptions Disp Refills    Phentermine HCl 15 MG Oral Cap 30 capsule 3     Sig: Take 1 capsule (15 mg total) by mouth every morning.    topiramate 100 MG Oral Tab 60 tablet 5     Sig: Take 1 tablet (100 mg total) by mouth 2 (two) times daily.       Imaging & Consults:  None      Plan:  Patient has lost 23# since LOV in 8/2024 on Phentermine 15 mg daily, Topamax 50 mg BID with a total weight loss of 23# since initial consult on 8/14/24 with initial weight of 191# and historical baseline BMI 32.78. Weight loss goal: lose 10-15# in 6 months- met, 30-45# in 1 year.  CPM, aside from increase Topamax. Repeat BC completed and reviewed.  on constipation prevention, impact of stress on weight with tools to help reduce, comfort foods. See patient instructions below for additional plans and patient counseling.      Patient Instructions   Continue making lifestyle changes that focus on good nutrition, regular exercise and stress management.    Medication Plan: Continue Phentermine at current dose and increase Topamax to 100 mg twice a day.    Next steps to work on before next office visit include:  Stress, both physical and mental, can have stress on the body. Tips and tools listed below to help combat occurrence. Great work in being conscious of your previous coping mechanisms of going to food for comfort. Consider alternatives  like meditation.    Repeat body composition (BC) completed today in comparison to previous BC with %change in total body fat from 38.7 to 33.7% (goal <32%), Visceral Fat from 10 to 8 (goal <10), BMI from 31.5 to 27.1 (goal <25), and muscle mass from 15.6 to 17.9% (goal >21%).  Improved health metrics!    Why We Gain Weight When We’re Stressed--And How Not To  The psychology and biology of stress-related overeating and weight gain   Emotional Eating under Stress  Have you ever found yourself mindlessly eating a tub of ice cream while you brood about your latest romantic rejection or eating a hamburger and fries in front of your computer as you furiously try to make a work deadline? Perhaps you’re a busy mom, eating cookies in your car as you shuttle the kids back and forth to a slew of activities. Or you’re a small business owner desperately trying to make ends meet when you suddenly realize your waistline has expanded. If you recognize yourself in any of these scenarios, you’re not alone and it’s probably not your fault. Stress that goes on for a long period is a triple whammy for weight--it increases our appetites, makes us hold onto the fat, and interferes with our willpower to implement a healthy lifestyle.  Below are the four major reasons stress leads to weight gain and four great research-based coping strategies you can use to fight back.  Hormones  When your brain detects the presence of a threat, no matter if it is a snake in the grass, a grumpy boss, or a big credit card bill, it triggers the release of a cascade of chemicals, including adrenaline, CRH, and cortisol. Your brain and body prepare to handle the threat by making you feel alert, ready for action and able to withstand an injury. In the short-term, adrenaline helps you feel less hungry as your blood flows away from the internal organs and to your large muscles to prepare for “fight or flight.” However, once the effects of adrenaline wear off,  cortisol, known as the “stress hormone,” hangs around and starts signaling the body to replenish your food supply. Fighting off wild animals, like our ancestors did, used up a lot of energy, so their bodies needed more stores of fat and glucose. Today’s human, who sits on the couch worrying about how to pay the bill or works long hours at the computer to make the deadline, does not work off much energy at all dealing with the stressor! Unfortunately, we are stuck with a neuroendocrine system that didn’t get the update, so your brain is still going to tell you to reach for that plate of cookies anyway.  Belly Fat  In the days when our ancestors were fighting off tigers and famine, their bodies adapted by learning to store fat supplies for the long haul. The unfortunate result for you and me is that when we are chronically stressed by life crises and work-life demands, we are prone to getting an extra layer of “visceral fat” deep in our bellies. Your belly has an ample supply of blood vessels and cortisol receptors to make the whole process flow more efficiently. The downside is that excess belly fat is unhealthy and difficult to get rid of. The fat releases chemicals triggering inflammation, which increases the likelihood that we will develop heart disease or diabetes. And it can make it more difficult to fit into those rebekah jeans you splurged on, leading to more stress about money wasted! Unfortunately, excess cortisol also slows down your metabolism, because your body wants to maintain an adequate supply of glucose for all that hard mental and physical work dealing with the threat.  Anxiety  When we have a surge of adrenaline as part of our fight/flight response, we get fidgety and activated. Adrenaline is the reason for the “wired up” feeling we get when we’re stressed. While we may burn off some extra calories fidgeting or running around cleaning because we can’t sit still, anxiety can also trigger “emotional  eating.” Overeating or eating unhealthy foods in response to stress or as a way to calm down is a very common response. In the most recent American Psychological Association’s “Stress in [place]:” survey, a whopping 40% of respondents reported dealing with stress in this way, while 42% reported watching television for more than 2 hours a day to deal with stress. Being a couch potato also increases the temptation to overeat and is inactive, which means that those extra calories aren’t getting burned off. Anxiety can also make you eat more “mindlessly” as you churn around worrying thoughts in your head, not even focusing on the taste of the food, how much you’ve eaten, or when you are feeling full. When you eat mindlessly, you will likely eat more, yet feel less satisfied.  Cravings and Fast Food  When we are chronically stressed, we crave “comfort foods,” such as a bag of potato chips or a tub of ice cream. These foods tend to be easy to eat, highly processed, and high in fat, sugar, or salt. We crave these foods for both biological and psychological reasons. Stress may mess up our brain’s reward system or cortisol may cause us to crave more fat and sugar. We also may have memories from childhood, such as the smell of freshly baked cookies,, that lead us to associate sweet foods with comfort. When we are stressed, we also may be more likely to drive through the Fast Food place, rather than taking the time and mental energy to plan and cook a meal. Americans are less likely to cook and eat dinner at home than people from many other countries, and they also work more hours. Working in urban areas may mean long, jammed commutes, which both increase stress and interfere with willpower because we are hungrier when we get home later. A [facility] research study showed, in laboratory mice, that being “stressed” by exposure to the smell of a predator lead the mice to eat more high-fat food pellets, when given  the choice of eating these instead of normal feed.  Less Sleep  Do you ever lie awake at night worrying about paying the bills or about who will watch your kids when you have to go to work? According to the APA’s “Stress in [place]” survey, more than 40% of us lie awake at night as a result of stress. Research shows that worry is a major cause of insomnia. Our minds are overactive and won’t switch off. We may also lose sleep because of pulling overnights to cram for exams or writing until the early hours. Stress causes decreased blood sugar, which leads to fatigue. If you drink coffee or caffeinated soft drinks to stay awake, or alcohol to feel better, your sleep cycle will be even more disrupted. Sleep is also a powerful factor influencing weight gain or loss. Lack of sleep may disrupt the functioning of ghrelin and leptin--chemicals that control appetite. We also crave carbs when we are tired or grumpy from lack of sleep. Finally, not getting our devonte zzzz’s erodes our willpower and ability to resist temptation. In one study, overweight/obese dieters were asked to follow a fixed calorie diet and assigned to get either 5 and a half or eight and a half hours of sleep a night (in a sleep lab). Those with sleep deprivation lost substantially less weight.  How to Minimize Weight Gain When You’re Stressed  Exercise  Aerobic exercise has a one-two punch. It can decrease cortisol and trigger release of chemicals that relieve pain and improve mood. It can also help speed your metabolism so you burn off the extra indulgences  Eat Mindfully  Mindful Eating programs train you in meditation, which helps you cope with stress, and change your consciousness around eating. You learn to slow down and tune in to your sensory experience of the food, including its sight, texture or smell. You also learn to tune into your subjective feelings of hunger or fullness, rather than eating just because it’s a mealtime or because there is  food in front of you. A well-designed study of binge-eaters showed that participating in a Mindful Eating program led to fewer binges and reduced depression.  Find Rewarding Activities Unrelated to Food  Taking a hike, reading a book, going to a yoga class, getting a massage, patting your dog, or making time for friends and family can help to relieve stress without adding on the pounds. Although you may feel that you don’t have time for leisure activities with looming deadlines, taking time to relieve stress helps you to feel refreshed, lets you think more clearly, and improves your mood, so you are less likely to overeat.  Write in a Journal  Writing down your experiences and reactions or your most important goals keeps your hands busy and your mind occupied, so you’re less likely to snack on unhealthy foods. Writing can give you insight into why you’re feeling so stressed and highlight ways of thinking or expectations of yourself that may be increasing the pressure you feel. Writing down your healthy eating and exercise goals may make you more conscious of your desire to live a healthier lifestyle and intensify your commitment. Research studies have also shown that writing expressively or about life goals can improve both mood and health.    Source: Psychology Today  Date: Posted Aug 28, 2013   Author: Elvira Duran, Ph.D      Biological Consciousness: Stress Management through Mindfulness and Body Awareness    by Annette Abraham, MEd, NFPT, 200-YTT  OAC Winter 2016 @ [email or web address]-[email or web address]-[email or web address]-mindfulness-[email or web address]-body-[email or web address]    Stress can adversely affect our health and wellbeing, but what is actually taking place in the body that makes it so worrisome for our health? What exactly is stress, and how does it influence our biological systems? How can a feeling cause an injury or illness? Most importantly, how can chronic stress be managed in order  to prevent or reverse negative health effects?    Through mindfulness and breath awareness, the functioning of our entire biological system is shifted, enhancing our mood, decision making skills and improving our chance of experiencing stress. It also improves our perception of stress, or our “health locus of control.”    Nearly all individuals experience acute or chronic mental stress and accept it as a normal part of living in the current day and age. According to the American Psychological Association and the American Doss of Stress, the amount of perceived stress individuals face has increased exponentially in the past five years. Among the top stressors are money, work and personal health. In fact, 52 percent of Americans rated their health as a cause of stress.    Not only has the overall health of Americans steadily declined --as shown by recent research that indicates only 40 percent of Americans rated their health as very good or excellent -- but the level of perceived stress has increased three-fold. We have learned we have the greatest influence on our health, happiness and perceived stress than ever thought before.    Chronic stress has the ability to shift our biological functions from a state of wellness and balance, to a state of potential injury, illness and even chronic disease. Stress is commonly thought of as a negative occurrence leading to a range of uncomfortable emotions and symptoms. However, stress can also be the foundation for productivity and innovation. The main distinction lies within our perception and choice responses to the stress.    What is Stress then, If it Can be Both Helpful and Harmful?  Stress can be defined as any situation that disturbs the equilibrium of a living organism (including plants and animals) and forces them to adapt or change. Mental stress can be any situation in which environmental demands - internal demands or both - tax or exceed the adaptive resources  of an individual, social system or physical tissue system.    Mental Stress  Mental stress is an internal experience; an experience that interferes with the entirety of our physiological makeup. Not only does stress challenge our mental capacity to adapt, as well as our current state of happiness, it also directly influences our homeostasis (balance). An individual’s belief in the dominance they have over stress also plays a major factor in mental, physical and emotional responses to stress.    These beliefs are what form an individual’s “health locus of control” (the perceived control over health outcomes). Those with a high external health locus of control perceive stress and their response to stress as being out of their control, whereas those with a high internal health locus of control believe that stress responses are influenced at their will. Studies indicate that individuals who have a high external locus of control experience worse health outcomes than those who feel that their health is within their control.    In order for the physical effects of mental stress to be understood, we must start within our internal make up, originating with the tiniest of cells. The human body is made up of trillions of cells, each conducting a magnetic field of energy. The energetic pulse of excitable tissue (i.e. nerves, muscles, etc.) is essentially what brings life to our bodies. Internally, this force field stimulates the function of our metabolism, digestive system, heart rate, hormone production, immunity and even our nervous system’s responses.    The consequences of chronic stress on our energy systems can lead to unfavorable health effects. Symptoms of stress such as elevated heart rate, increased blood pressure, slowed metabolism, tension headaches and anxiety can lead to more severe chronic illnesses. Maintaining a constant level of stress for an extended period of time can trigger adverse health outcomes such as  heart disease, weight gain, chronic migraines and even cancer, to name a few.    Physical Stress  So, how does a feeling resonate physically in the body, and what is taking place to decrease our health? The beginning of our internal reactions to stress originates in the brain. The hypothalamus, located in the lower midbrain, is responsible for our primal and instinctual responses. When under stress, the hypothalamus first alarms the body of potential harm by sending nerve signals to the adrenal glands, located above the kidneys. These glands then release a surge of hormones including adrenaline (epinephrine), and cortisol. This communication prompts a series of physical responses including elevated heart rate, increased skeletal muscle blood flow and behavior activation.    Our primal instincts driven by the lower-mid brain take over functioning and instinctual decision making through the influence of the sympathetic nervous system (SNS) (which is responsible for the ‘fight or flight’ response). When operating through the SNS, numerous bodily systems are shut down to focus only on necessary functions. For instance, thyroid functioning is working at a slower pace, insulin production is decreased and blood flow to the digestive system is less, resulting in slower metabolism and reduced nutritional intake. When cortisol is being released during this state, fat storage is increased in order to ensure survival.    This phenomenon is a factor contributing to the current epidemic of obesity. In females, stress has also been linked with the disruption of the normal menstrual cycle. Prolonged exposure to stress can lead to severe sexual malfunction including the inability to ovulate, menstrual irregularities, infertility and the complete impairment of reproductive functions.    Managing Your Stress with Mindfulness  So, how can stress be managed, reduced or even eliminated, in order to reverse or halt the harmful effects of  it? The power to influence your reactions to stress and build resilience can be achieved through mindfulness, self-awareness, acknowledging our own intuitive guidance system and practicing stress management techniques such as yoga and meditation, amongst others. Mind and body awareness allows individuals to influence their susceptibility to stress or trauma on the physical body.    Understanding and expanding our health locus of control by challenging negative thoughts has long been used to decrease stress and improve quality of life. Fortunately, mindful awareness can be practiced and learned no matter where you are in your current state of stress! In this context, mindfulness refers in part to the heightened awareness of an outside stress as the first step toward relaxing, in a way that can minimize the effects of that stress on the body.    Practicing Breath Awareness  The foundation for a healthier condition of living can be achieved most readily through breath awareness. Simply taking the time to breathe with awareness has the power to shift our functioning from the SNS to the parasympathetic nervous system (PNS), the system responsible for the ‘rest and digest’ activity within the body. When in this state, the body is able to restore its normal function, which stops the production of stress hormones, lowers blood pressure, increases lung capacity and calms the nervous system.    The taxing demands of our environment make it difficult to believe that something as simple as breath focus can influence so powerfully our state of mind. However, breath awareness and meditation are some of the most solid tools available to conquer stress. Mindfulness involves stopping, paying attention and becoming aware of the present moment’s realities in a non-judgmental way.    Practicing deep breathing improves blood flow to the intuitive prefrontal cortex of the brain, thereby enhancing your mood and decision making skills  instantaneously. When we are able to think more clearly, and reroute our energy in a positive direction, we experience the power of our own mental capacity and are offered a moment to choose a reaction to a given situation. This simple practice is unfortunately underutilized, even though it is available to everyone, every day. The self-awareness found in the breath offers opportunities for reflection, contemplation, and guidance towards our own moral compass. When we’re self-aware, we understand the role we play in choosing our perceptions, and the deliberate creation of our own reality.    As we learn to shift our focus to what is under our control, rather than what is not, we enhance our internal health locus of control. This empowering shift reveals the choice we have concerning how long we decide to be confined by the emotional effects of stress, and is also determines the severity of our biological responses to the stress. Breath is the optimal place to begin when cultivating a greater sense of self, because it brings your mind into the present moment and away from fear of the future. Accompanied with exercise, positive social circles and nutritionally balanced diets, mindfulness fosters a greater quality of life.    Conclusion  The connection between stress and the onset or manifestation of physical ailments has more recently been accepted in Western culture. By expanding our health locus of control, the biological responses to stress and our influence of these responses through mindful breath awareness can significantly reduce our risks for the adverse health effects of stress. However, stress need not be present in order to increase your quality of life through mindfulness.    Mindfulness, mediation, a healthy active lifestyle and compassion for yourself encourages a joyful experience in life, and can be practiced during even the busiest times in your life. We are in control of how we perceive and respond to  situations, and by practicing the shift of our focus from negative to positive while remaining in the present moment, we’ll see our lives be enriched through the power of choice with happiness beyond our greatest expectations.    Freddie.      Comfort Foods - Why do they make us happy?    by Rosie Santiago, PhD, [facility]    [facility] @ www.obesityaction.org Summer 2013 Resources    “There, there. Just let me bake you some cookies.” “Don’t cry. We’ll stop and get some ice cream, and it will all be better.”    Do these sound familiar? Maybe statements that you heard as a young child? They were innocent words and very genuine actions on the part of our caregivers to express love and concern to us when we were hurting. The way they knew to do this was through food - and not just any food. Usually, the foods offered were foods rich in fats and carbohydrates - the foods that we have come to term “comfort foods.” In this way, food has come to be used as a special type of medicine, as an anti-depressant of types, to cure the mood that ails us. However, such patterns can become very problematic, especially if it is a habitual pattern causing excessive weight gain.    Scientists continue to research the effect that the chemical composition of foods may have on our moods. While such research is very valuable, I want to focus on the psychology, not the biology, of comfort food.    Food and Customs  All cultures have customs around food. In my childhood, money was scarce and when there was some kind of special occasion, it meant that we would use limited resources to buy special foods. It meant that we were being treated in a special way. Birthdays meant choosing a special meal and a type of cake and ice cream (within a budget). Funerals involved having food brought to the bereaved. The  ritual is one of special interest to this topic. The message is quite obvious: “I hope this food makes you happier.” Again, nothing is  ill-intended and the gift is given with much love and care. However, it is another reinforcement of the use of food to make us “feel better.”    We are given messages early in our lives and then reinforced throughout our lives about how food can make us feel different, to feel better. Because we equate food with happiness, we continue to turn to food for such comfort. And we do feel happy or better, albeit temporarily.    Changing Patterns  1. The key to changing this lifelong pattern of equating food with happiness is to first be aware. Take some time to reflect on how food was used through your life and its connection to emotional states for you.    2. Next, take some time to reflect on your own emotional states. You may keep a feeling journal and write down how you felt each day. In reflecting, you will be more aware of the connection of food to your feelings in the past and more aware of your feelings in the present.    3. Then, the work begins. Take each emotion connected to food and create a list of other things you may do to tend to that emotion. For instance, you may have “sadness” as one emotion that has been connected to eating. Alternative ways to get comfort when sad may be:    Talk to a friend  Cry  Journal your feelings  Listen to music  Write a song or a poem    4. By creating alternatives, you begin to see how you can break the cycle of comfort eating.    5. Post this list of alternatives in a place that you are likely to see it regularly. Consult it. Add to it as needed. Or sushil things off that you have tried that maybe didn’t work.    Food and Behavioral Conditioning  One important means of understanding the connection between food and behavior is understanding how we are conditioned to have a certain response when we are exposed repeatedly to a stimuli. In this case, when we have been told repeatedly that we can feel better with food (the stimuli), we believe that we do indeed feel better (the  response) when we eat cakes and cookies and such. However, what we don’t think about is the other stimulus, the care, the concern, the love that came with the food and how that made us feel. In other words, we may have attributed our response (feeling better) to the wrong stimulus (food) rather than the one that actually did make us feel better, which was the love we felt.    So maybe it is not the food that makes us feel happy. Maybe it is the memory of these people expressing their love and care to us. Maybe that is what really makes us happy. Patients often tell me that they eat when sad, lonely or bored. They are seeking comfort.    They want to feel “full” or “satisfied” and the food does offer that physical release. However, the true comfort that they seek cannot be found in carbohydrates or fat, but it can be found in the feeling of belonging, of connecting with others, of being creative and inspired.    Conclusion  Remember, you have had a lifetime of creating a pattern of using comfort foods, so it is not likely to change quickly. Make sure to give yourself some time to make these changes. When you are able to change the relationship with food, you are able to change your relationships with others, and you just might find more satisfying and healthier relationships.      Constipation  Constipation is a common and often uncomfortable problem. Constipation means you have bowel movements fewer than 3 times per week, or strain to pass hard, dry stool. It can last a short time. Or it can be a problem that never seems to go away. The good news is that it can often be treated and controlled.  Eat more fiber  One of the best ways to help treat constipation is to increase your fiber intake. You can do this either through diet or by using fiber supplements. Fiber (in whole grains, fruits, and vegetables) adds bulk and absorbs water to soften the stool. This helps the stool pass through the colon more easily. When you  increase your fiber intake, do it slowly to avoid side effects such as bloating. Also increase the amount of water that you drink. Eating more of the following foods can add fiber to your diet.  High-fiber cereals  Whole grains, bran, and brown rice  Vegetables such as carrots, broccoli, and greens  Fresh fruits (especially apples, pears, and dried fruits like raisins and apricots)  Nuts and legumes (especially beans such as lentils, kidney beans, and lima beans)  Get physically active  Exercise helps improve the working of your colon which helps ease constipation. Try to get some physical activity every day. If you haven’t been active for a while, talk to your healthcare provider before starting again.  © 7441-1658 [employer]. [address] [place] [address], [place] [address]. All rights reserved. This information is not intended as a substitute for professional medical care. Always follow your healthcare professional's instructions.      Recommended over the counter supplementation options include: Miralax (mild laxative).     VSL#3 probiotic (www.vsl3.com, please order online), 1 capsule daily with water for 30 days, then can reduce to 1 capsule every other day for maintenance and cost savings. VSL#3 needs to be stored in the refrigerator, but can be left out for 2 weeks. Do not take with hot beverages or this will deactivate the probiotics. This can be taken in conjunction with the supplementation noted above to help provide a healthy gut environment.      Medication use and SEs reviewed with patient.    Return in about 4 months (around 8/16/2025) for weight management via clinic or Telemedicine Visit and in clinic in November.    Patient verbalizes understanding.    DOCUMENTATION OF TIME SPENT: Code selection for this visit was based on time spent : 30 minutes on date of service in preparing to see the patient, obtaining and/or reviewing separately obtained history, performing a medically appropriate  examination, counseling and educating the patient/family/caregiver, ordering medications or testing, referring and communicating with other healthcare providers, documenting clinical information in the electronic medical record, independently interpreting results and communicating results to the patient/family/caregiver and care coordination with the patient's other providers.      Answers submitted by the patient for this visit:  Medical Weight Loss Follow Up (Submitted on 4/9/2025)  If greater than 5/1O how would you grade your coping mechanisms?: moderate

## 2025-04-16 NOTE — PATIENT INSTRUCTIONS
Continue making lifestyle changes that focus on good nutrition, regular exercise and stress management.    Medication Plan: Continue Phentermine at current dose and increase Topamax to 100 mg twice a day.    Next steps to work on before next office visit include:  Stress, both physical and mental, can have stress on the body. Tips and tools listed below to help combat occurrence. Great work in being conscious of your previous coping mechanisms of going to food for comfort. Consider alternatives like meditation.    Repeat body composition (BC) completed today in comparison to previous BC with %change in total body fat from 38.7 to 33.7% (goal <32%), Visceral Fat from 10 to 8 (goal <10), BMI from 31.5 to 27.1 (goal <25), and muscle mass from 15.6 to 17.9% (goal >21%).  Improved health metrics!    Why We Gain Weight When We’re Stressed--And How Not To  The psychology and biology of stress-related overeating and weight gain   Emotional Eating under Stress  Have you ever found yourself mindlessly eating a tub of ice cream while you brood about your latest romantic rejection or eating a hamburger and fries in front of your computer as you furiously try to make a work deadline? Perhaps you’re a busy mom, eating cookies in your car as you shuttle the kids back and forth to a slew of activities. Or you’re a small business owner desperately trying to make ends meet when you suddenly realize your waistline has expanded. If you recognize yourself in any of these scenarios, you’re not alone and it’s probably not your fault. Stress that goes on for a long period is a triple whammy for weight--it increases our appetites, makes us hold onto the fat, and interferes with our willpower to implement a healthy lifestyle.  Below are the four major reasons stress leads to weight gain and four great research-based coping strategies you can use to fight back.  Hormones  When your brain detects the presence of a threat, no matter if it is a  snake in the grass, a grumpy boss, or a big credit card bill, it triggers the release of a cascade of chemicals, including adrenaline, CRH, and cortisol. Your brain and body prepare to handle the threat by making you feel alert, ready for action and able to withstand an injury. In the short-term, adrenaline helps you feel less hungry as your blood flows away from the internal organs and to your large muscles to prepare for “fight or flight.” However, once the effects of adrenaline wear off, cortisol, known as the “stress hormone,” hangs around and starts signaling the body to replenish your food supply. Fighting off wild animals, like our ancestors did, used up a lot of energy, so their bodies needed more stores of fat and glucose. Today’s human, who sits on the couch worrying about how to pay the bill or works long hours at the computer to make the deadline, does not work off much energy at all dealing with the stressor! Unfortunately, we are stuck with a neuroendocrine system that didn’t get the update, so your brain is still going to tell you to reach for that plate of cookies anyway.  Belly Fat  In the days when our ancestors were fighting off tigers and famine, their bodies adapted by learning to store fat supplies for the long haul. The unfortunate result for you and me is that when we are chronically stressed by life crises and work-life demands, we are prone to getting an extra layer of “visceral fat” deep in our bellies. Your belly has an ample supply of blood vessels and cortisol receptors to make the whole process flow more efficiently. The downside is that excess belly fat is unhealthy and difficult to get rid of. The fat releases chemicals triggering inflammation, which increases the likelihood that we will develop heart disease or diabetes. And it can make it more difficult to fit into those rebekah jeans you splurged on, leading to more stress about money wasted! Unfortunately, excess cortisol also  slows down your metabolism, because your body wants to maintain an adequate supply of glucose for all that hard mental and physical work dealing with the threat.  Anxiety  When we have a surge of adrenaline as part of our fight/flight response, we get fidgety and activated. Adrenaline is the reason for the “wired up” feeling we get when we’re stressed. While we may burn off some extra calories fidgeting or running around cleaning because we can’t sit still, anxiety can also trigger “emotional eating.” Overeating or eating unhealthy foods in response to stress or as a way to calm down is a very common response. In the most recent American Psychological Association’s “Stress in [place]:” survey, a whopping 40% of respondents reported dealing with stress in this way, while 42% reported watching television for more than 2 hours a day to deal with stress. Being a couch potato also increases the temptation to overeat and is inactive, which means that those extra calories aren’t getting burned off. Anxiety can also make you eat more “mindlessly” as you churn around worrying thoughts in your head, not even focusing on the taste of the food, how much you’ve eaten, or when you are feeling full. When you eat mindlessly, you will likely eat more, yet feel less satisfied.  Cravings and Fast Food  When we are chronically stressed, we crave “comfort foods,” such as a bag of potato chips or a tub of ice cream. These foods tend to be easy to eat, highly processed, and high in fat, sugar, or salt. We crave these foods for both biological and psychological reasons. Stress may mess up our brain’s reward system or cortisol may cause us to crave more fat and sugar. We also may have memories from childhood, such as the smell of freshly baked cookies,, that lead us to associate sweet foods with comfort. When we are stressed, we also may be more likely to drive through the Fast Food place, rather than taking the time and mental energy to  plan and cook a meal. Americans are less likely to cook and eat dinner at home than people from many other countries, and they also work more hours. Working in urban areas may mean long, jammed commutes, which both increase stress and interfere with willpower because we are hungrier when we get home later. A [facility] research study showed, in laboratory mice, that being “stressed” by exposure to the smell of a predator lead the mice to eat more high-fat food pellets, when given the choice of eating these instead of normal feed.  Less Sleep  Do you ever lie awake at night worrying about paying the bills or about who will watch your kids when you have to go to work? According to the APA’s “Stress in [place]” survey, more than 40% of us lie awake at night as a result of stress. Research shows that worry is a major cause of insomnia. Our minds are overactive and won’t switch off. We may also lose sleep because of pulling overnights to cram for exams or writing until the early hours. Stress causes decreased blood sugar, which leads to fatigue. If you drink coffee or caffeinated soft drinks to stay awake, or alcohol to feel better, your sleep cycle will be even more disrupted. Sleep is also a powerful factor influencing weight gain or loss. Lack of sleep may disrupt the functioning of ghrelin and leptin--chemicals that control appetite. We also crave carbs when we are tired or grumpy from lack of sleep. Finally, not getting our devonte zzzz’s erodes our willpower and ability to resist temptation. In one study, overweight/obese dieters were asked to follow a fixed calorie diet and assigned to get either 5 and a half or eight and a half hours of sleep a night (in a sleep lab). Those with sleep deprivation lost substantially less weight.  How to Minimize Weight Gain When You’re Stressed  Exercise  Aerobic exercise has a one-two punch. It can decrease cortisol and trigger release of chemicals that relieve  pain and improve mood. It can also help speed your metabolism so you burn off the extra indulgences  Eat Mindfully  Mindful Eating programs train you in meditation, which helps you cope with stress, and change your consciousness around eating. You learn to slow down and tune in to your sensory experience of the food, including its sight, texture or smell. You also learn to tune into your subjective feelings of hunger or fullness, rather than eating just because it’s a mealtime or because there is food in front of you. A well-designed study of binge-eaters showed that participating in a Mindful Eating program led to fewer binges and reduced depression.  Find Rewarding Activities Unrelated to Food  Taking a hike, reading a book, going to a yoga class, getting a massage, patting your dog, or making time for friends and family can help to relieve stress without adding on the pounds. Although you may feel that you don’t have time for leisure activities with looming deadlines, taking time to relieve stress helps you to feel refreshed, lets you think more clearly, and improves your mood, so you are less likely to overeat.  Write in a Journal  Writing down your experiences and reactions or your most important goals keeps your hands busy and your mind occupied, so you’re less likely to snack on unhealthy foods. Writing can give you insight into why you’re feeling so stressed and highlight ways of thinking or expectations of yourself that may be increasing the pressure you feel. Writing down your healthy eating and exercise goals may make you more conscious of your desire to live a healthier lifestyle and intensify your commitment. Research studies have also shown that writing expressively or about life goals can improve both mood and health.    Source: Psychology Today  Date: Posted Aug 28, 2013   Author: Elvira Duran, Ph.D      Biological Consciousness: Stress Management through Mindfulness and Body Awareness    by  Annette Abraham, MEd, PT, 200-YTT  OAC Winter 2016 @ [email or web address]    Stress can adversely affect our health and wellbeing, but what is actually taking place in the body that makes it so worrisome for our health? What exactly is stress, and how does it influence our biological systems? How can a feeling cause an injury or illness? Most importantly, how can chronic stress be managed in order to prevent or reverse negative health effects?    Through mindfulness and breath awareness, the functioning of our entire biological system is shifted, enhancing our mood, decision making skills and improving our chance of experiencing stress. It also improves our perception of stress, or our “health locus of control.”    Nearly all individuals experience acute or chronic mental stress and accept it as a normal part of living in the current day and age. According to the American Psychological Association and the American [place] of Stress, the amount of perceived stress individuals face has increased exponentially in the past five years. Among the top stressors are money, work and personal health. In fact, 52 percent of Americans rated their health as a cause of stress.    Not only has the overall health of Americans steadily declined --as shown by recent research that indicates only 40 percent of Americans rated their health as very good or excellent -- but the level of perceived stress has increased three-fold. We have learned we have the greatest influence on our health, happiness and perceived stress than ever thought before.    Chronic stress has the ability to shift our biological functions from a state of wellness and balance, to a state of potential injury, illness and even chronic disease. Stress is commonly thought of as a negative occurrence leading to a range of uncomfortable emotions and symptoms. However,  stress can also be the foundation for productivity and innovation. The main distinction lies within our perception and choice responses to the stress.    What is Stress then, If it Can be Both Helpful and Harmful?  Stress can be defined as any situation that disturbs the equilibrium of a living organism (including plants and animals) and forces them to adapt or change. Mental stress can be any situation in which environmental demands - internal demands or both - tax or exceed the adaptive resources of an individual, social system or physical tissue system.    Mental Stress  Mental stress is an internal experience; an experience that interferes with the entirety of our physiological makeup. Not only does stress challenge our mental capacity to adapt, as well as our current state of happiness, it also directly influences our homeostasis (balance). An individual’s belief in the dominance they have over stress also plays a major factor in mental, physical and emotional responses to stress.    These beliefs are what form an individual’s “health locus of control” (the perceived control over health outcomes). Those with a high external health locus of control perceive stress and their response to stress as being out of their control, whereas those with a high internal health locus of control believe that stress responses are influenced at their will. Studies indicate that individuals who have a high external locus of control experience worse health outcomes than those who feel that their health is within their control.    In order for the physical effects of mental stress to be understood, we must start within our internal make up, originating with the tiniest of cells. The human body is made up of trillions of cells, each conducting a magnetic field of energy. The energetic pulse of excitable tissue (i.e. nerves, muscles, etc.) is essentially what brings life to our bodies. Internally, this force field stimulates the  function of our metabolism, digestive system, heart rate, hormone production, immunity and even our nervous system’s responses.    The consequences of chronic stress on our energy systems can lead to unfavorable health effects. Symptoms of stress such as elevated heart rate, increased blood pressure, slowed metabolism, tension headaches and anxiety can lead to more severe chronic illnesses. Maintaining a constant level of stress for an extended period of time can trigger adverse health outcomes such as heart disease, weight gain, chronic migraines and even cancer, to name a few.    Physical Stress  So, how does a feeling resonate physically in the body, and what is taking place to decrease our health? The beginning of our internal reactions to stress originates in the brain. The hypothalamus, located in the lower midbrain, is responsible for our primal and instinctual responses. When under stress, the hypothalamus first alarms the body of potential harm by sending nerve signals to the adrenal glands, located above the kidneys. These glands then release a surge of hormones including adrenaline (epinephrine), and cortisol. This communication prompts a series of physical responses including elevated heart rate, increased skeletal muscle blood flow and behavior activation.    Our primal instincts driven by the lower-mid brain take over functioning and instinctual decision making through the influence of the sympathetic nervous system (SNS) (which is responsible for the ‘fight or flight’ response). When operating through the SNS, numerous bodily systems are shut down to focus only on necessary functions. For instance, thyroid functioning is working at a slower pace, insulin production is decreased and blood flow to the digestive system is less, resulting in slower metabolism and reduced nutritional intake. When cortisol is being released during this state, fat storage is increased in order to ensure survival.    This  phenomenon is a factor contributing to the current epidemic of obesity. In females, stress has also been linked with the disruption of the normal menstrual cycle. Prolonged exposure to stress can lead to severe sexual malfunction including the inability to ovulate, menstrual irregularities, infertility and the complete impairment of reproductive functions.    Managing Your Stress with Mindfulness  So, how can stress be managed, reduced or even eliminated, in order to reverse or halt the harmful effects of it? The power to influence your reactions to stress and build resilience can be achieved through mindfulness, self-awareness, acknowledging our own intuitive guidance system and practicing stress management techniques such as yoga and meditation, amongst others. Mind and body awareness allows individuals to influence their susceptibility to stress or trauma on the physical body.    Understanding and expanding our health locus of control by challenging negative thoughts has long been used to decrease stress and improve quality of life. Fortunately, mindful awareness can be practiced and learned no matter where you are in your current state of stress! In this context, mindfulness refers in part to the heightened awareness of an outside stress as the first step toward relaxing, in a way that can minimize the effects of that stress on the body.    Practicing Breath Awareness  The foundation for a healthier condition of living can be achieved most readily through breath awareness. Simply taking the time to breathe with awareness has the power to shift our functioning from the SNS to the parasympathetic nervous system (PNS), the system responsible for the ‘rest and digest’ activity within the body. When in this state, the body is able to restore its normal function, which stops the production of stress hormones, lowers blood pressure, increases lung capacity and calms the nervous system.    The taxing demands of our  environment make it difficult to believe that something as simple as breath focus can influence so powerfully our state of mind. However, breath awareness and meditation are some of the most solid tools available to conquer stress. Mindfulness involves stopping, paying attention and becoming aware of the present moment’s realities in a non-judgmental way.    Practicing deep breathing improves blood flow to the intuitive prefrontal cortex of the brain, thereby enhancing your mood and decision making skills instantaneously. When we are able to think more clearly, and reroute our energy in a positive direction, we experience the power of our own mental capacity and are offered a moment to choose a reaction to a given situation. This simple practice is unfortunately underutilized, even though it is available to everyone, every day. The self-awareness found in the breath offers opportunities for reflection, contemplation, and guidance towards our own moral compass. When we’re self-aware, we understand the role we play in choosing our perceptions, and the deliberate creation of our own reality.    As we learn to shift our focus to what is under our control, rather than what is not, we enhance our internal health locus of control. This empowering shift reveals the choice we have concerning how long we decide to be confined by the emotional effects of stress, and is also determines the severity of our biological responses to the stress. Breath is the optimal place to begin when cultivating a greater sense of self, because it brings your mind into the present moment and away from fear of the future. Accompanied with exercise, positive social circles and nutritionally balanced diets, mindfulness fosters a greater quality of life.    Conclusion  The connection between stress and the onset or manifestation of physical ailments has more recently been accepted in Western culture. By expanding our health locus of control, the  biological responses to stress and our influence of these responses through mindful breath awareness can significantly reduce our risks for the adverse health effects of stress. However, stress need not be present in order to increase your quality of life through mindfulness.    Mindfulness, mediation, a healthy active lifestyle and compassion for yourself encourages a joyful experience in life, and can be practiced during even the busiest times in your life. We are in control of how we perceive and respond to situations, and by practicing the shift of our focus from negative to positive while remaining in the present moment, we’ll see our lives be enriched through the power of choice with happiness beyond our greatest expectations.    Freddie.      Comfort Foods - Why do they make us happy?    by Rosie Santiago, PhD, HSPP    OAC @ [email or web address] Summer 2013 Resources    “There, there. Just let me bake you some cookies.” “Don’t cry. We’ll stop and get some ice cream, and it will all be better.”    Do these sound familiar? Maybe statements that you heard as a young child? They were innocent words and very genuine actions on the part of our caregivers to express love and concern to us when we were hurting. The way they knew to do this was through food - and not just any food. Usually, the foods offered were foods rich in fats and carbohydrates - the foods that we have come to term “comfort foods.” In this way, food has come to be used as a special type of medicine, as an anti-depressant of types, to cure the mood that ails us. However, such patterns can become very problematic, especially if it is a habitual pattern causing excessive weight gain.    Scientists continue to research the effect that the chemical composition of foods may have on our moods. While such research is very valuable, I want to focus on the psychology, not the biology, of comfort food.    Food and Customs  All cultures have customs around  food. In my childhood, money was scarce and when there was some kind of special occasion, it meant that we would use limited resources to buy special foods. It meant that we were being treated in a special way. Birthdays meant choosing a special meal and a type of cake and ice cream (within a budget). Funerals involved having food brought to the bereaved. The  ritual is one of special interest to this topic. The message is quite obvious: “I hope this food makes you happier.” Again, nothing is ill-intended and the gift is given with much love and care. However, it is another reinforcement of the use of food to make us “feel better.”    We are given messages early in our lives and then reinforced throughout our lives about how food can make us feel different, to feel better. Because we equate food with happiness, we continue to turn to food for such comfort. And we do feel happy or better, albeit temporarily.    Changing Patterns  1. The key to changing this lifelong pattern of equating food with happiness is to first be aware. Take some time to reflect on how food was used through your life and its connection to emotional states for you.    2. Next, take some time to reflect on your own emotional states. You may keep a feeling journal and write down how you felt each day. In reflecting, you will be more aware of the connection of food to your feelings in the past and more aware of your feelings in the present.    3. Then, the work begins. Take each emotion connected to food and create a list of other things you may do to tend to that emotion. For instance, you may have “sadness” as one emotion that has been connected to eating. Alternative ways to get comfort when sad may be:    Talk to a friend  Cry  Journal your feelings  Listen to music  Write a song or a poem    4. By creating alternatives, you begin to see how you can break the cycle of comfort eating.    5. Post this list of alternatives in a place that  you are likely to see it regularly. Consult it. Add to it as needed. Or sushil things off that you have tried that maybe didn’t work.    Food and Behavioral Conditioning  One important means of understanding the connection between food and behavior is understanding how we are conditioned to have a certain response when we are exposed repeatedly to a stimuli. In this case, when we have been told repeatedly that we can feel better with food (the stimuli), we believe that we do indeed feel better (the response) when we eat cakes and cookies and such. However, what we don’t think about is the other stimulus, the care, the concern, the love that came with the food and how that made us feel. In other words, we may have attributed our response (feeling better) to the wrong stimulus (food) rather than the one that actually did make us feel better, which was the love we felt.    So maybe it is not the food that makes us feel happy. Maybe it is the memory of these people expressing their love and care to us. Maybe that is what really makes us happy. Patients often tell me that they eat when sad, lonely or bored. They are seeking comfort.    They want to feel “full” or “satisfied” and the food does offer that physical release. However, the true comfort that they seek cannot be found in carbohydrates or fat, but it can be found in the feeling of belonging, of connecting with others, of being creative and inspired.    Conclusion  Remember, you have had a lifetime of creating a pattern of using comfort foods, so it is not likely to change quickly. Make sure to give yourself some time to make these changes. When you are able to change the relationship with food, you are able to change your relationships with others, and you just might find more satisfying and healthier relationships.      Constipation  Constipation is a common and often uncomfortable problem. Constipation means you have bowel movements fewer than 3 times per week,  or strain to pass hard, dry stool. It can last a short time. Or it can be a problem that never seems to go away. The good news is that it can often be treated and controlled.  Eat more fiber  One of the best ways to help treat constipation is to increase your fiber intake. You can do this either through diet or by using fiber supplements. Fiber (in whole grains, fruits, and vegetables) adds bulk and absorbs water to soften the stool. This helps the stool pass through the colon more easily. When you increase your fiber intake, do it slowly to avoid side effects such as bloating. Also increase the amount of water that you drink. Eating more of the following foods can add fiber to your diet.  High-fiber cereals  Whole grains, bran, and brown rice  Vegetables such as carrots, broccoli, and greens  Fresh fruits (especially apples, pears, and dried fruits like raisins and apricots)  Nuts and legumes (especially beans such as lentils, kidney beans, and lima beans)  Get physically active  Exercise helps improve the working of your colon which helps ease constipation. Try to get some physical activity every day. If you haven’t been active for a while, talk to your healthcare provider before starting again.  © 1640-8170 [employer]. [address] [place] [address], [place] [address]. All rights reserved. This information is not intended as a substitute for professional medical care. Always follow your healthcare professional's instructions.      Recommended over the counter supplementation options include: Miralax (mild laxative).     VSL#3 probiotic (www.vsl3.com, please order online), 1 capsule daily with water for 30 days, then can reduce to 1 capsule every other day for maintenance and cost savings. VSL#3 needs to be stored in the refrigerator, but can be left out for 2 weeks. Do not take with hot beverages or this will deactivate the probiotics. This can be taken in conjunction with the supplementation noted above  to help provide a healthy gut environment.

## 2025-04-28 ENCOUNTER — APPOINTMENT (OUTPATIENT)
Dept: ADMINISTRATIVE | Facility: HOSPITAL | Age: 50
End: 2025-04-28
Payer: COMMERCIAL

## 2025-04-29 ENCOUNTER — LAB ENCOUNTER (OUTPATIENT)
Dept: LAB | Age: 50
End: 2025-04-29
Attending: INTERNAL MEDICINE
Payer: COMMERCIAL

## 2025-04-29 DIAGNOSIS — Z01.818 PRE-OP TESTING: ICD-10-CM

## 2025-04-29 LAB
CHLORIDE SERPL-SCNC: 110 MMOL/L (ref 98–112)
CO2 SERPL-SCNC: 22 MMOL/L (ref 21–32)
POTASSIUM SERPL-SCNC: 4.7 MMOL/L (ref 3.5–5.1)
SODIUM SERPL-SCNC: 138 MMOL/L (ref 136–145)

## 2025-04-29 PROCEDURE — 80051 ELECTROLYTE PANEL: CPT

## 2025-04-29 PROCEDURE — 36415 COLL VENOUS BLD VENIPUNCTURE: CPT

## 2025-05-09 NOTE — IMAGING NOTE
Assisted Dr.Nimesh Thomason with mammography guided needle localization of the right breast.   Lorelei Clemente identified with spelling of name and date of birth.   Medications and allergies reviewed. NKDA reported:     History: right breast -Fibroepithelial lesion   Surgery: Right breast wire localized excisional biopsy     Order verified.  Procedure explained and questions answered. Lorelei Clemente verbalized understanding and agreement.  Educational material provided   1100: Written consent obtained.     1107: Scans taken by Carissa- mammography technologist    Awaiting radiologist availability    1114: Dr. Thomason  present    1114: Time out complete.    1111: Site prepped in a sterile manner.   1115: Lidocaine administered for anesthetic affect.  1116: Dwyer 20G x 5cm needle placed- right breast, 11 o'clock position, 8 cm from the nipple, Q shaped clip, biopsy confirms fibroepithelial lesion     Emotional support provided.  Lorelei Clemente tolerated procedure well.     Site cleaned.  Wire secured with blue clip, steri strips, sterile 4x4 gauze dressing, and Tegaderm.    Lorelei Clemente transported via wheelchair to pre-op/surgery holding in stable condition. Ms. Esteban Clemente without complaints or concerns at this time.

## 2025-05-12 ENCOUNTER — ANESTHESIA EVENT (OUTPATIENT)
Dept: SURGERY | Facility: HOSPITAL | Age: 50
End: 2025-05-12
Payer: COMMERCIAL

## 2025-05-12 ENCOUNTER — ANESTHESIA (OUTPATIENT)
Dept: SURGERY | Facility: HOSPITAL | Age: 50
End: 2025-05-12
Payer: COMMERCIAL

## 2025-05-12 ENCOUNTER — HOSPITAL ENCOUNTER (OUTPATIENT)
Dept: MAMMOGRAPHY | Facility: HOSPITAL | Age: 50
Discharge: HOME | End: 2025-05-12
Attending: SURGERY | Admitting: SURGERY
Payer: COMMERCIAL

## 2025-05-12 ENCOUNTER — HOSPITAL ENCOUNTER (OUTPATIENT)
Facility: HOSPITAL | Age: 50
Setting detail: HOSPITAL OUTPATIENT SURGERY
Discharge: HOME | End: 2025-05-12
Attending: SURGERY | Admitting: SURGERY
Payer: COMMERCIAL

## 2025-05-12 VITALS
HEART RATE: 73 BPM | RESPIRATION RATE: 16 BRPM | SYSTOLIC BLOOD PRESSURE: 95 MMHG | OXYGEN SATURATION: 97 % | BODY MASS INDEX: 27.28 KG/M2 | DIASTOLIC BLOOD PRESSURE: 62 MMHG | TEMPERATURE: 97 F | HEIGHT: 65.5 IN | WEIGHT: 165.69 LBS

## 2025-05-12 DIAGNOSIS — Z01.818 PRE-OP TESTING: Primary | ICD-10-CM

## 2025-05-12 DIAGNOSIS — N63.10 MASS OF RIGHT BREAST, UNSPECIFIED QUADRANT: ICD-10-CM

## 2025-05-12 PROCEDURE — 76098 X-RAY EXAM SURGICAL SPECIMEN: CPT | Performed by: SURGERY

## 2025-05-12 PROCEDURE — 19281 PERQ DEVICE BREAST 1ST IMAG: CPT | Performed by: SURGERY

## 2025-05-12 RX ORDER — BUPIVACAINE HYDROCHLORIDE 5 MG/ML
INJECTION, SOLUTION EPIDURAL; INTRACAUDAL; PERINEURAL AS NEEDED
Status: DISCONTINUED | OUTPATIENT
Start: 2025-05-12 | End: 2025-05-12

## 2025-05-12 RX ORDER — SODIUM CHLORIDE, SODIUM LACTATE, POTASSIUM CHLORIDE, CALCIUM CHLORIDE 600; 310; 30; 20 MG/100ML; MG/100ML; MG/100ML; MG/100ML
INJECTION, SOLUTION INTRAVENOUS CONTINUOUS
Status: DISCONTINUED | OUTPATIENT
Start: 2025-05-12 | End: 2025-05-12

## 2025-05-12 RX ORDER — LIDOCAINE HYDROCHLORIDE 10 MG/ML
INJECTION, SOLUTION EPIDURAL; INFILTRATION; INTRACAUDAL; PERINEURAL AS NEEDED
Status: DISCONTINUED | OUTPATIENT
Start: 2025-05-12 | End: 2025-05-12 | Stop reason: SURG

## 2025-05-12 RX ORDER — PROCHLORPERAZINE EDISYLATE 5 MG/ML
5 INJECTION INTRAMUSCULAR; INTRAVENOUS EVERY 8 HOURS PRN
Status: DISCONTINUED | OUTPATIENT
Start: 2025-05-12 | End: 2025-05-12

## 2025-05-12 RX ORDER — DIPHENHYDRAMINE HYDROCHLORIDE 50 MG/ML
12.5 INJECTION, SOLUTION INTRAMUSCULAR; INTRAVENOUS AS NEEDED
Status: DISCONTINUED | OUTPATIENT
Start: 2025-05-12 | End: 2025-05-12

## 2025-05-12 RX ORDER — HYDROCODONE BITARTRATE AND ACETAMINOPHEN 5; 325 MG/1; MG/1
1-2 TABLET ORAL EVERY 6 HOURS PRN
Qty: 20 TABLET | Refills: 0 | Status: SHIPPED | OUTPATIENT
Start: 2025-05-12

## 2025-05-12 RX ORDER — ONDANSETRON 2 MG/ML
INJECTION INTRAMUSCULAR; INTRAVENOUS AS NEEDED
Status: DISCONTINUED | OUTPATIENT
Start: 2025-05-12 | End: 2025-05-12 | Stop reason: SURG

## 2025-05-12 RX ORDER — DEXAMETHASONE SODIUM PHOSPHATE 4 MG/ML
VIAL (ML) INJECTION AS NEEDED
Status: DISCONTINUED | OUTPATIENT
Start: 2025-05-12 | End: 2025-05-12 | Stop reason: SURG

## 2025-05-12 RX ORDER — SCOPOLAMINE 1 MG/3D
1 PATCH, EXTENDED RELEASE TRANSDERMAL ONCE
Status: DISCONTINUED | OUTPATIENT
Start: 2025-05-12 | End: 2025-05-12

## 2025-05-12 RX ORDER — ACETAMINOPHEN 500 MG
1000 TABLET ORAL ONCE
Status: DISCONTINUED | OUTPATIENT
Start: 2025-05-12 | End: 2025-05-12

## 2025-05-12 RX ORDER — LIDOCAINE HYDROCHLORIDE AND EPINEPHRINE 10; 10 MG/ML; UG/ML
INJECTION, SOLUTION INFILTRATION; PERINEURAL AS NEEDED
Status: DISCONTINUED | OUTPATIENT
Start: 2025-05-12 | End: 2025-05-12

## 2025-05-12 RX ORDER — HYDROMORPHONE HYDROCHLORIDE 1 MG/ML
0.2 INJECTION, SOLUTION INTRAMUSCULAR; INTRAVENOUS; SUBCUTANEOUS EVERY 5 MIN PRN
Status: DISCONTINUED | OUTPATIENT
Start: 2025-05-12 | End: 2025-05-12

## 2025-05-12 RX ORDER — HYDROCODONE BITARTRATE AND ACETAMINOPHEN 5; 325 MG/1; MG/1
2 TABLET ORAL ONCE AS NEEDED
Status: DISCONTINUED | OUTPATIENT
Start: 2025-05-12 | End: 2025-05-12

## 2025-05-12 RX ORDER — HYDROCODONE BITARTRATE AND ACETAMINOPHEN 5; 325 MG/1; MG/1
1 TABLET ORAL ONCE AS NEEDED
Status: DISCONTINUED | OUTPATIENT
Start: 2025-05-12 | End: 2025-05-12

## 2025-05-12 RX ORDER — NALOXONE HYDROCHLORIDE 0.4 MG/ML
0.08 INJECTION, SOLUTION INTRAMUSCULAR; INTRAVENOUS; SUBCUTANEOUS AS NEEDED
Status: DISCONTINUED | OUTPATIENT
Start: 2025-05-12 | End: 2025-05-12

## 2025-05-12 RX ORDER — ONDANSETRON 2 MG/ML
4 INJECTION INTRAMUSCULAR; INTRAVENOUS EVERY 6 HOURS PRN
Status: DISCONTINUED | OUTPATIENT
Start: 2025-05-12 | End: 2025-05-12

## 2025-05-12 RX ORDER — MIDAZOLAM HYDROCHLORIDE 1 MG/ML
1 INJECTION INTRAMUSCULAR; INTRAVENOUS EVERY 5 MIN PRN
Status: DISCONTINUED | OUTPATIENT
Start: 2025-05-12 | End: 2025-05-12

## 2025-05-12 RX ORDER — ACETAMINOPHEN 500 MG
1000 TABLET ORAL ONCE AS NEEDED
Status: DISCONTINUED | OUTPATIENT
Start: 2025-05-12 | End: 2025-05-12

## 2025-05-12 RX ORDER — HYDROMORPHONE HYDROCHLORIDE 1 MG/ML
0.4 INJECTION, SOLUTION INTRAMUSCULAR; INTRAVENOUS; SUBCUTANEOUS EVERY 5 MIN PRN
Status: DISCONTINUED | OUTPATIENT
Start: 2025-05-12 | End: 2025-05-12

## 2025-05-12 RX ORDER — DIAZEPAM 5 MG/1
5 TABLET ORAL EVERY 30 MIN PRN
Status: DISCONTINUED | OUTPATIENT
Start: 2025-05-12 | End: 2025-05-12

## 2025-05-12 RX ADMIN — SODIUM CHLORIDE, SODIUM LACTATE, POTASSIUM CHLORIDE, CALCIUM CHLORIDE: 600; 310; 30; 20 INJECTION, SOLUTION INTRAVENOUS at 14:26:00

## 2025-05-12 RX ADMIN — SODIUM CHLORIDE, SODIUM LACTATE, POTASSIUM CHLORIDE, CALCIUM CHLORIDE: 600; 310; 30; 20 INJECTION, SOLUTION INTRAVENOUS at 14:49:00

## 2025-05-12 RX ADMIN — DEXAMETHASONE SODIUM PHOSPHATE 8 MG: 4 MG/ML VIAL (ML) INJECTION at 14:26:00

## 2025-05-12 RX ADMIN — LIDOCAINE HYDROCHLORIDE 50 MG: 10 INJECTION, SOLUTION EPIDURAL; INFILTRATION; INTRACAUDAL; PERINEURAL at 14:21:00

## 2025-05-12 RX ADMIN — ONDANSETRON 4 MG: 2 INJECTION INTRAMUSCULAR; INTRAVENOUS at 14:26:00

## 2025-05-12 NOTE — DISCHARGE INSTRUCTIONS
Breast Surgery  Post-operative Instructions  Excisional Biopsy, Lumpectomy, Mastectomy, Baltic Node Biopsy, or Axillary  Lymph Node Dissection  Blanca Martell MD  General Instructions  The following instructions will provide helpful information that will assist your recovery. These are designed to be general guidelines. Please remember that everyone heals and recovers differently. Listen to your body and rest when you are tired. If you have any questions or concerns, please do not hesitate to contact my office. I would like to see you in the office about one week after surgery, please schedule and appointment through my office to make a post-operative appointment if you do not already have one.     Restrictions  There are no lifting weight restrictions for the arm on the surgical side. You may gradually increase the amount of weight based on your comfort level. You should avoid a lot of repetitious activity with the arm until the drain is out (if one was placed) and the wound is well-healed (about two weeks).   You should not drive a car until you believe you can react to an emergency situation and you’re no longer taking narcotic pain medications.   You may shower the day after surgery. You should not bathe or swim (i.e. submerge wound) until the wound is well healed (about two weeks).  There are no dietary restrictions.    Exercise  You may begin arm exercises within a couple days. Do these 2 or 3 times per day, beginning with light exercise and gradually increase your range of motion and repetitions. This will help your arm regain full mobility. We will address your activity level again at your post-operative visit.   You will have pain medication prescribed before discharge. Take this as directed to relieve pain. It is important that you be comfortable so that you may continue your stretching exercises.   If you find the medication prescribed is too strong, try Tylenol (Acetaminophen) or  Ibuprofen.    Wound Care  You may remove the gauze dressing on the first or second postoperative day and then shower. You should leave the steri-strips in place; they will start to peel off about 10 days after your surgery. The stitches are all underneath the skin and will dissolve on their own. You will not need any stitches removed except if you have a drain in place.  I encourage you to shower once the outer bandage is removed, you may use soap and water directly over the steri-strips and pat dry following.  You should keep gauze dressing on the wound until the wound is completely dry and without drainage-usually 1-3 days.   If a surgical bra was placed after the surgery, I encourage you to wear it as much as possible during the week following the procedure (including during sleep). Alternatively, you may choose to wear your own bra provided it is comfortable, provides support and does not have an underwire. If the breast doesn’t move it is less painful.  If an elastic bandage was placed around your chest after the surgery you may remove it on the 1st or 2nd day after surgery. If you prefer to leave it on longer, you may.  It is normal to feel a lump in the area of the incisions for up to 6 months. This is part of the healing process. Eventually the breast will return to its normal condition.   Drain Care (if placed at time of axillary dissection or mastectomy)-This will be explained by your nurse prior to discharge.  Empty the drain and strip the tubing 2-3 times daily, more often if the plastic squeeze bottle fills up. This will prevent the tubing from clogging.   Starting at the top of the tubing next to your body firmly grasp the tubing with the index finger and thumb of one hand. With the other hand use the index finger and thumb to move the fluid down the tubing (this is called “stripping the tubing”).   Uncap the pouring spout and squeeze the contents of the plastic bottle into the measuring cup.   Squeeze  the bottle flat to create suction and replace the cap while squeezing to maintain the vacuum.   Measure and record the output and discard the fluid into the toilet. Record the output each time you empty the bottle.   Keep track of the output (drainage) and when the total is down to 30 cc’s or less over a 24-hour period we’ll remove the drain in the office. This is usually after 1-2  weeks, but can be longer in certain patients.   Drain removal takes about 30 seconds and is virtually painless. The suture is cut and the drain slides right out. You should call my office as the output approaches 30 cc’s over 24 hours so that we may schedule an office visit for drain removal.  If you have had reconstruction your plastic surgeon will remove the drain according to their protocol.    Pain Medication  You will be given a prescription for a narcotic pain medication (usually Norco) upon discharge. Many patients have very little pain and don’t want to use the narcotic. Don’t be afraid to use it if you’re uncomfortable. If you’d prefer you may substitute Tylenol or Ibuprofen (Motrin, Advil). Using an ice pack for a few minutes over the incision can also alleviate pain. If you do use the narcotic medication, use an over the counter stool softener or gentle laxative and stay well-hydrated as constipation is not uncommon with narcotics.    Pathology Report  The Pathology report is usually available 4-5 business days following the surgery. I will call you  with the results once the report is available.    Notify my office if:   Your temperature is over 101.5 F   You notice increasing swelling, redness, warmth or drainage from around the incision or drain site.    If you experience any problems please call my office and either my nurse or myself will respond. After hours, you will be forwarded to my answering service which will help you get in touch with myself or the physician covering for me.

## 2025-05-12 NOTE — ANESTHESIA POSTPROCEDURE EVALUATION
[facility]    Lorelei Clemente Patient Status:  Hospital Outpatient Surgery   Age/Gender 49 year old female MRN [id number]   Location [facility] SURGERY Attending Blanca Martell MD   Hosp Day # 0 PCP Fozia Thibodeaux MD       Anesthesia Post-op Note    Right breast wire localized excisional biopsy    Procedure Summary       Date: 05/12/25 Room / Location:  MAIN OR [address] [place] [address] MAIN OR    Anesthesia Start: 1415 Anesthesia Stop: 1453    Procedure: Right breast wire localized excisional biopsy (Right: Breast) Diagnosis:       Mass of right breast, unspecified quadrant      (Mass of right breast, unspecified quadrant [N63.10])    Surgeons: Blanca Martell MD Anesthesiologist: Burak Barrow MD    Anesthesia Type: general ASA Status: 2            Anesthesia Type: No value filed.    Vitals Value Taken Time   /72 05/12/25 14:59   Temp 98.1 05/12/25 14:59   Pulse 92 05/12/25 14:59   Resp 18 05/12/25 14:59   SpO2 98% 05/12/25 14:59           Patient Location: Same Day Surgery    Anesthesia Type: MAC    Airway Patency: patent    Postop Pain Control: adequate    Mental Status: mildly sedated but able to meaningfully participate in the post-anesthesia evaluation    Nausea/Vomiting: none    Cardiopulmonary/Hydration status: stable euvolemic    Complications: no apparent anesthesia related complications    Postop vital signs: stable    Dental Exam: Unchanged from Preop    Patient to be discharged home when criteria met.

## 2025-05-12 NOTE — OPERATIVE REPORT
[facility]    PATIENT'S NAME: ANDREWS FINK   ATTENDING PHYSICIAN: Blanca Martell M.D.   OPERATING PHYSICIAN: Blanca Martell M.D.   PATIENT ACCOUNT#:   [id number]    LOCATION:  [address]  MEDICAL RECORD #:   [id number]       YOB: 1975  ADMISSION DATE:       05/12/2025      OPERATION DATE:  05/12/2025    OPERATIVE REPORT      PREOPERATIVE DIAGNOSIS:  Fibroepithelial lesion of the right breast.  POSTOPERATIVE DIAGNOSIS:  Fibroepithelial lesion of the right breast.  PROCEDURE:  Right breast wire-localized lumpectomy with right breast specimen radiography.    ASSISTANT:  Aide Person CSA.    ANESTHESIA:  Monitored anesthesia care and local.    ESTIMATED BLOOD LOSS:  5 mL.    DRAINS:  None.    COMPLICATIONS:  None.    DISPOSITION:  Stable on transfer to recovery room.    INDICATIONS:  The patient is a 49-year-old female who presented with an imaging-detected concern in the right breast.  She has a prior history of a left breast radial scar and papilloma and on surveillance imaging was found to have a new lesion in the right breast and underwent a biopsy confirming a fibroepithelial lesion.  We discussed the need to differentiate this from a proliferative phyllodes tumor and a formal localized surgical excision was recommended.  Risks and possible complications were discussed with the patient including, but not limited to, infection, bleeding, injury to surrounding structures, and possible need for reoperation and she agreed with the proposed surgery.    OPERATIVE TECHNIQUE:  Patient was brought to the imaging suite where she had a wire localization in the area of concern in the right breast.  She was then brought to the OR, placed in supine position, properly padded and secured, given a dose of IV antibiotics, and sequential compression devices were applied to her legs for DVT prophylaxis.  Monitored anesthesia care was induced, and the right breast was  prepped and draped in the usual sterile fashion.  Lidocaine 1% with epinephrine was used to infiltrate the target incision site, and a curvilinear incision was made in the upper outer quadrant at the trajectory of the wire with a 15 blade knife through the skin.  The wire was identified, brought in the field, and a segment of breast tissue surrounding the tip of the wire was carefully excised and oriented with a short stitch and single clip superiorly, long stitch and double clip laterally in order to allow for appropriate pathological margin assessment and review.  Using sharp dissection and electrocautery, I inspected the cavity and it demonstrated no residual palpable concerns or other findings in this area.  Th specimen was placed in the imaging device and specimen x-ray did show the presence of the targeted mass and clip with adequate margins as deemed by myself.  I placed a clip back within the cavity to assist with subsequent surveillance.  The wound was irrigated, hemostasis assured with electrocautery, and it was closed with interrupted 3-0 Vicryl for deep layer and running 4-0 subcuticular Monocryl for skin.  Mastisol and Steri-Strips were applied.  Marcaine 0.5% was instilled in the cavity to assist with postoperative analgesia.  A sterile dressing and compression bra were placed.  Blood loss was minimal.  All counts were correct at the conclusion of the procedure.  She tolerated the procedure well and was transferred to recovery room in stable condition.    Dictated By Blanca Martell M.D.  d: 05/12/2025 14:55:00  t: 05/12/2025 15:56:20  [facility] [id number]  G/    cc: Fozia Thibodeaux M.D.

## 2025-05-12 NOTE — ANESTHESIA PREPROCEDURE EVALUATION
PRE-OP EVALUATION    Patient Name: Lorelei Clemente    Admit Diagnosis: Mass of right breast, unspecified quadrant [N63.10]    Pre-op Diagnosis: Mass of right breast, unspecified quadrant [N63.10]    Right breast wire localized excisional biopsy    Anesthesia Procedure: Right breast wire localized excisional biopsy (Right)    Surgeons and Role:     * Blanca Martell MD - Primary    Pre-op vitals reviewed.  Temp: 97 °F (36.1 °C)  Pulse: 65  Resp: 16  BP: 121/82  SpO2: 100 %  Body mass index is 27.15 kg/m².    Current medications reviewed.  Hospital Medications:  Current Medications[1]    Outpatient Medications:   Prescriptions Prior to Admission[2]    Allergies: Shrimp      Anesthesia Evaluation        Anesthetic Complications           GI/Hepatic/Renal      (-) GERD       (-) chronic renal disease   (-) liver disease                 Cardiovascular                  (-) hypertension     (-) CAD  (-) past MI  (-) CABG/stent  (-) pacemaker/AICD  (-) valvular problems/murmurs     (-) dysrhythmias   (-) CHF  (-) angina     (-) WEINER         Endo/Other      (-) diabetes                            Pulmonary      (-) asthma  (-) COPD                   Neuro/Psych          (-) CVA     (-) seizures                       Past Surgical History[3]  Social Hx on file[4]  History   Drug Use No     Available pre-op labs reviewed.     Lab Results   Component Value Date     04/29/2025    K 4.7 04/29/2025     04/29/2025    CO2 22.0 04/29/2025            Airway      Mallampati: II  Mouth opening: 3 FB  TM distance: 4 - 6 cm  Neck ROM: full Cardiovascular    Cardiovascular exam normal.         Dental             Pulmonary    Pulmonary exam normal.                 Other findings              ASA: 2   Plan: general  NPO status verified and           Plan/risks discussed with: patient                Present on Admission:  **None**             [1]    [Transfer Hold] acetaminophen (Tylenol Extra Strength) tab 1,000 mg   1,000 mg Oral Once    [Transfer Hold] scopolamine (Transderm-Scop) 1 MG/3DAYS patch 1 patch  1 patch Transdermal Once    lactated ringers infusion   Intravenous Continuous    ceFAZolin (Ancef) 2g in 10mL IV syringe premix  2 g Intravenous Once    [Transfer Hold] diazePAM (Valium) tab 5 mg  5 mg Oral Q30 Min PRN   [2]   Medications Prior to Admission   Medication Sig Dispense Refill Last Dose/Taking    Phentermine HCl 15 MG Oral Cap Take 1 capsule (15 mg total) by mouth every morning. 30 capsule 3 2025    topiramate 100 MG Oral Tab Take 1 tablet (100 mg total) by mouth 2 (two) times daily. 60 tablet 5 2025 Morning    ergocalciferol 1.25 MG (61703 UT) Oral Cap Take 1 capsule (50,000 Units total) by mouth once a week. With food 12 capsule 1 5/10/2025    levothyroxine 75 MCG Oral Tab Take 1 tablet (75 mcg total) by mouth before breakfast. 90 tablet 3 2025 at  7:00 AM    diclofenac (VOLTAREN) 1 % External Gel Apply 4 g topically every 6 (six) hours as needed. 1 each 2 More than a month    SUMAtriptan 25 MG Oral Tab TAKE 1 TABLET BY MOUTH AS NEEDED FOR MIGRAINE. MAY REPEAT IN 2 HOURS IF NEEDED(MAX 2 TABLETS PER DAY) AS DIRECTED 9 tablet 1 More than a month   [3]   Past Surgical History:  Procedure Laterality Date    Abdominoplasty      2025    Cholecystectomy  2004    Hysterectomy      partial hyst    Hysteroscopy, sterilization  2011    Essure    Lino localization wire 1 site left (cpt=19281) Left 2024    2 site, BN    Needle biopsy left Left 2024    2 site, radial scar          x3    Tonsillectomy  2010   [4]   Social History  Socioeconomic History    Marital status:    Tobacco Use    Smoking status: Never    Smokeless tobacco: Never   Vaping Use    Vaping status: Never Used   Substance and Sexual Activity    Alcohol use: Not Currently    Drug use: No    Sexual activity: Yes     Partners: Male     Comment: Rah

## 2025-05-12 NOTE — H&P
Diagnosis: Left breast, radial scar/papilloma, s/p excisional biopsy on 3/7/2024     Stage: N/A     Disease Status:  Surgical treatment complete, no further treatment pending     History of Present Illness:   Ms. Lorelei Clemente is a 49 year old woman who presents with imaging detected concerns of the left breast.  The patient denies any self detected clinical symptoms including palpable masses, nipple discharge, skin changes or axillary symptoms.  She does not have any known family history of breast cancer.  She has no personal prior history of breast disease or biopsies.  She had her screening mammogram January 4, 2024 and was found to have new asymmetry found in her breast bilaterally.  She had a bilateral diagnostic evaluation on January 9, 2024 and was found to have 2 areas at 7:00 in the left breast correspond to irregular nodules measuring 1 cm and 5 mm respectively which were determined indeterminant for which biopsies were recommended with otherwise benign appearing findings.  The 2 site left breast ultrasound-guided biopsy took place on January 12, 2024 and confirmed fragments of radial scar at both locations with an additional finding of papilloma at one of the sites. She underwent left breast excisional biopsy, which occurred without complication. She healed well after surgery but did develop a recent finding on her right breast imaging.  Her bilateral diagnostic evaluation in February 2025 demonstrated 2 lesions in the right breast at 10:00 and 11:00 for which biopsies were recommended with postsurgical changes of the left breast.  The biopsies took place on February 13, 2025 and at the 10 o'clock position she was found have a benign fibroadenoma and at the 11 o'clock position a fibroepithelial lesion. She is here today for evaluation and recommendations for further therapy.        Past Medical History       Past Medical History:    Anxiety    Decorative tattoo    Depression    Disorder of  thyroid    History of gestational diabetes in prior pregnancy, currently pregnant (HCC)    HPV in female    Migraine    Migraines    Prediabetes    Sterilization     Essure    Wears glasses            Past Surgical History         Past Surgical History:   Procedure Laterality Date    Cholecystectomy   2004    Hysterectomy         partial hyst    Hysteroscopy, sterilization   2011     Essure    Lino localization wire 1 site left (cpt=19281) Left 2024     2 site, BN    Needle biopsy left Left 2024     2 site, radial scar             x3    Tonsillectomy   2010            Gynecological History:  Pt is a   Pt was 24 years old at time of first pregnancy.    She has cumulative breastfeeding history of 8 months.  She achieved menarche at age 15 and LMP 10/1/2016  Age of Menopause: 40  Type: hysterectomy with ovaries left in  She denies any history of hormone replacement therapy   She has history of oral contraceptive use for 2 years, last in .  She denies infertility treatment to achieve pregnancy.     Medications:    Current Medications    ondansetron (ZOFRAN) 4 mg tablet Take 1 tablet (4 mg total) by mouth every 8 (eight) hours as needed for Nausea. 30 tablet 1    ergocalciferol 1.25 MG (93878 UT) Oral Cap Take 1 capsule (50,000 Units total) by mouth once a week. With food 12 capsule 1    topiramate 50 MG Oral Tab Take 1 tablet (50 mg total) by mouth 2 (two) times daily. 180 tablet 1    Phentermine HCl 15 MG Oral Cap Take 1 capsule (15 mg total) by mouth every morning. 30 capsule 3    levothyroxine 75 MCG Oral Tab Take 1 tablet (75 mcg total) by mouth before breakfast. 90 tablet 3    ALPRAZolam (XANAX) 0.25 MG Oral Tab Take 1 tablet (0.25 mg total) by mouth nightly as needed. 10 tablet 0    diclofenac (VOLTAREN) 1 % External Gel Apply 4 g topically every 6 (six) hours as needed. 1 each 2    buPROPion  MG Oral Tablet 24 Hr Take 1 tablet (300 mg total) by mouth daily. 90 tablet  3    SUMAtriptan 25 MG Oral Tab TAKE 1 TABLET BY MOUTH AS NEEDED FOR MIGRAINE. MAY REPEAT IN 2 HOURS IF NEEDED(MAX 2 TABLETS PER DAY) AS DIRECTED 9 tablet 1            Allergies:    Allergies        Allergies   Allergen Reactions    Shrimp TONGUE SWELLING            Family History:   Family History         Family History   Problem Relation Age of Onset    Other (Other) Mother           thyroid disease    Diabetes Mother      Hypertension Mother      Lipids Mother      Diabetes Maternal Grandmother      Hypertension Maternal Grandmother      Heart Attack Maternal Grandfather      Diabetes Maternal Grandfather      Hypertension Maternal Grandfather      Heart Disorder Maternal Grandfather           heart attack    Diabetes Paternal Grandmother      Diabetes Paternal Grandfather      Cancer Maternal Aunt 57         lung cancer            She is not of Ashkenazi Orthodox ancestry.     Social History:      History   Alcohol Use No             History   Smoking Status    Never   Smokeless Tobacco    Never      Ms. Lorelei Clemente is  with 4 children. She has 7 siblings. She is currently Employed full-time     Review of Systems:  General:   The patient denies, fever, chills, night sweats, fatigue, generalized weakness, change in appetite or weight loss.     HEENT:     The patient denies eye irritation, cataracts, redness, glaucoma, yellowing of the eyes, change in vision, color blindness, or +wearing contacts/glasses. The patient denies hearing loss, ringing in the ears, ear drainage, earaches, nasal congestion, nose bleeds, +snoring, pain in mouth/throat, hoarseness, change in voice, facial trauma.     Respiratory:  The patient denies chronic cough, phlegm, hemoptysis, pleurisy/chest pain, pneumonia, asthma, wheezing, difficulty in breathing with exertion, emphysema, chronic bronchitis, shortness of breath or abnormal sound when breathing.      Cardiovascular:  There is no history of chest pain, chest  pressure/discomfort, palpitations, irregular heartbeat, fainting or near-fainting, difficulty breathing when lying flat, SOB/Coughing at night, swelling of the legs or chest pain while walking.     Breasts:  See history of present illness     Gastrointestinal:     There is no history of difficulty or pain with swallowing, reflux symptoms, vomiting, dark or bloody stools, constipation, yellowing of the skin, indigestion, nausea, change in bowel habits, diarrhea, abdominal pain or vomiting blood.      Genitourinary:  The patient denies frequent urination, needing to get up at night to urinate, urinary hesitancy or retaining urine, painful urination, urinary incontinence, decreased urine stream, blood in the urine or vaginal/penile discharge.     Skin:    The patient denies rash, itching, skin lesions, dry skin, change in skin color or change in moles.      Hematologic/Lymphatic:  The patient denies easily bruising or bleeding or persistent swollen glands or lymph nodes.      Musculoskeletal:  The patient denies muscle aches/pain, joint pain, stiff joints, neck pain, back pain or bone pain.     Neuropsychiatric:  There is no history of migraines or severe headaches, seizure/epilepsy, speech problems, coordination problems, trembling/tremors, fainting/black outs, dizziness, memory problems, loss of sensation/numbness, problems walking, weakness, tingling or burning in hands/feet. There is no history of abusive relationship, bipolar disorder, sleep disturbance, anxiety, depression or feeling of despair.     Endocrine:    There is no history of poor/slow wound healing, weight loss/gain, fertility or hormone problems, cold intolerance, +thyroid disease.      Allergic/Immunologic:  There is no history of hives, hay fever, angioedema or anaphylaxis.     /69 (BP Location: Left arm, Patient Position: Sitting, Cuff Size: adult)   Pulse 93   Temp 98.1 °F (36.7 °C) (Temporal)   Resp 16   Wt 78.9 kg (174 lb)   LMP  05/28/2012 (LMP Unknown)   SpO2 100%   BMI 29.41 kg/m²      Physical Exam:  The patient is an alert, oriented, well-nourished and  well-developed woman who appears her stated age. Her speech patterns and movements are normal. Her affect is appropriate.     HEENT: The head is normocephalic. The neck is supple. The thyroid is not enlarged and is without palpable masses/nodules. There are no palpable masses. The trachea is in the midline. Conjunctiva are clear, non-icteric.     Chest: The chest expands symmetrically. The lungs are clear to auscultation.     Heart: The rhythm is regular.  There are no murmurs, rubs, gallops or thrills.     Breasts:  Her breasts are symmetrical with a cup size 38D.  Right breast: The skin, nipple ,and areola appear normal. There is no skin dimpling with movement of the pectoralis. There is no nipple retraction. No nipple discharge can be elicited. The parenchyma is mildly nodular. There are no dominant masses in the breast. The axillary tail is normal.  Left breast:   The skin, nipple, and areola appear normal. There is no skin dimpling with movement of the pectoralis. There is no nipple retraction. No nipple discharge can be elicited. The parenchyma is mildly nodular. There are no dominant masses in the breast. The axillary tail is normal.  There is a well-healed incision with no signs of clinical concern.     Abdomen:  The abdomen is soft, flat and non tender. The liver is not enlarged. There are no palpable masses.     Lymph Nodes:  The supraclavicular, axillary and cervical regions are free of significant lymphadenopathy.     Back: There is no vertebral column tenderness.     Skin: The skin appears normal. There are no suspicious appearing rashes or lesions.     Extremities: The extremities are without deformity, cyanosis or edema.     Impression:   Ms. Lorelei Clemente is a 49 year old woman presents with biopsy confirmed left breast radial scar and papilloma, s/p  excisional biopsy with benign pathology new diagnosis of right breast fibroepithelial lesion.     Recommendations:   I had a discussion with the Patient regarding her breast exam.  She is healing well since surgery with no signs of concern in the left breast.  I personally reviewed the recent imaging and pathology from the recent biopsy on the right breast.  We discussed the significance and implications of a fibroepithelial lesion including the inability to distinguish this from a fibroadenoma versus a benign phyllodes tumor and I have recommended a localized surgical excision to exclude any problems in this location.  This will require wire localization as it is not palpable.  The risks and possible complications of procedure were discussed at length with the patient and she agreed to proceed.  She was given ample opportunity for questions and those questions were answered to her satisfaction. She was encouraged to contact the office with any questions or concerns prior to her next scheduled appointment.     Pre-op Diagnosis: Mass of right breast, unspecified quadrant [N63.10]    The above referenced H&P was reviewed by Blanca Martell MD on 5/12/2025, the patient was examined and no significant changes have occurred in the patient's condition since the H&P was performed.  I discussed with the patient and/or legal representative the potential benefits, risks and side effects of this procedure; the likelihood of the patient achieving goals; and potential problems that might occur during recuperation.  I discussed reasonable alternatives to the procedure, including risks, benefits and side effects related to the alternatives and risks related to not receiving this procedure.  We will proceed with procedure as planned.

## 2025-05-12 NOTE — BRIEF OP NOTE
Pre-Operative Diagnosis: Mass of right breast, unspecified quadrant [N63.10]     Post-Operative Diagnosis: Mass of right breast, unspecified quadrant [N63.10]      Procedure Performed:   Right breast wire localized excisional biopsy    Surgeons and Role:     * Blanca Martell MD - Primary    Assistant(s):  Surgical Assistant.: Aide Person CSA     Surgical Findings: Clip visualized on specimen radiogram     Specimen: Right breast lumpectomy     Estimated Blood Loss: Blood Output: 5 mL (5/12/2025  2:44 PM)    Blanca Martell MD  5/12/2025  2:50 PM

## 2025-05-20 ENCOUNTER — OFFICE VISIT (OUTPATIENT)
Dept: SURGERY | Facility: CLINIC | Age: 50
End: 2025-05-20
Payer: COMMERCIAL

## 2025-05-20 VITALS
DIASTOLIC BLOOD PRESSURE: 79 MMHG | WEIGHT: 165 LBS | SYSTOLIC BLOOD PRESSURE: 122 MMHG | BODY MASS INDEX: 27 KG/M2 | HEART RATE: 83 BPM | RESPIRATION RATE: 16 BRPM | OXYGEN SATURATION: 99 %

## 2025-05-20 DIAGNOSIS — D24.1 BREAST FIBROADENOMA, RIGHT: Primary | ICD-10-CM

## 2025-05-20 PROCEDURE — 3074F SYST BP LT 130 MM HG: CPT | Performed by: SURGERY

## 2025-05-20 PROCEDURE — 99024 POSTOP FOLLOW-UP VISIT: CPT | Performed by: SURGERY

## 2025-05-20 PROCEDURE — 3078F DIAST BP <80 MM HG: CPT | Performed by: SURGERY

## 2025-05-29 PROBLEM — D24.1 BREAST FIBROADENOMA, RIGHT: Status: ACTIVE | Noted: 2025-05-29

## 2025-05-29 NOTE — PROGRESS NOTES
Breast Surgery Post-Operative Visit    Diagnosis: Right breast fibroadenoma status post surgical excision on May 12, 2025.Left breast, radial scar/papilloma, s/p excisional biopsy on 3/7/2024    Stage:  N/A    Disease Status:  Surgical treatment complete, no further treatment pending.    History of Present Illness:   Ms. Lorelei Clemente is a 49 year old woman who presents with imaging detected concerns of the left breast.  The patient denies any self detected clinical symptoms including palpable masses, nipple discharge, skin changes or axillary symptoms.  She does not have any known family history of breast cancer.  She has no personal prior history of breast disease or biopsies.  She had her screening mammogram January 4, 2024 and was found to have new asymmetry found in her breast bilaterally.  She had a bilateral diagnostic evaluation on January 9, 2024 and was found to have 2 areas at 7:00 in the left breast correspond to irregular nodules measuring 1 cm and 5 mm respectively which were determined indeterminant for which biopsies were recommended with otherwise benign appearing findings.  The 2 site left breast ultrasound-guided biopsy took place on January 12, 2024 and confirmed fragments of radial scar at both locations with an additional finding of papilloma at one of the sites. She underwent left breast excisional biopsy, which occurred without complication. She healed well after surgery but did develop a recent finding on her right breast imaging.  Her bilateral diagnostic evaluation in February 2025 demonstrated 2 lesions in the right breast at 10:00 and 11:00 for which biopsies were recommended with postsurgical changes of the left breast.  The biopsies took place on February 13, 2025 and at the 10 o'clock position she was found have a benign fibroadenoma and at the 11 o'clock position a fibroepithelial lesion.  Surgical excision was recommended and took place without complication.  She is here  today for evaluation and recommendations for further therapy.        Past Medical History:    Anxiety    Decorative tattoo    Depression    Disorder of thyroid    History of gestational diabetes in prior pregnancy, currently pregnant (HCC)    HPV in female    Migraine    Migraines    Sleep apnea    Sterilization    Essure    Visual impairment    Wears glasses       Past Surgical History:   Procedure Laterality Date    Abdominoplasty      2025    Cholecystectomy  2004    Hysterectomy      partial hyst    Hysteroscopy, sterilization  2011    Essure    Lino localization wire 1 site left (cpt=19281) Left 2024    2 site, BN    Needle biopsy left Left 2024    2 site, radial scar          x3    Tonsillectomy  2010       Gynecological History:  Pt is a   Pt was 24 years old at time of first pregnancy.    She has cumulative breastfeeding history of 8 months.  She achieved menarche at age 15 and LMP 10/1/2016  Age of Menopause: 40  Type: hysterectomy with ovaries left in  She denies any history of hormone replacement therapy   She has history of oral contraceptive use for 2 years, last in .  She denies infertility treatment to achieve pregnancy.    Medications:     HYDROcodone-acetaminophen 5-325 MG Oral Tab Take 1-2 tablets by mouth every 6 (six) hours as needed for Pain. 20 tablet 0    Phentermine HCl 15 MG Oral Cap Take 1 capsule (15 mg total) by mouth every morning. 30 capsule 3    topiramate 100 MG Oral Tab Take 1 tablet (100 mg total) by mouth 2 (two) times daily. 60 tablet 5    ergocalciferol 1.25 MG (89527 UT) Oral Cap Take 1 capsule (50,000 Units total) by mouth once a week. With food 12 capsule 1    levothyroxine 75 MCG Oral Tab Take 1 tablet (75 mcg total) by mouth before breakfast. 90 tablet 3    diclofenac (VOLTAREN) 1 % External Gel Apply 4 g topically every 6 (six) hours as needed. 1 each 2    SUMAtriptan 25 MG Oral Tab TAKE 1 TABLET BY MOUTH AS NEEDED FOR MIGRAINE.  MAY REPEAT IN 2 HOURS IF NEEDED(MAX 2 TABLETS PER DAY) AS DIRECTED 9 tablet 1       Allergies:    Allergies   Allergen Reactions    Shrimp TONGUE SWELLING       Family History:   Family History   Problem Relation Age of Onset    Other (Other) Mother         thyroid disease    Diabetes Mother     Hypertension Mother     Lipids Mother     Diabetes Maternal Grandmother     Hypertension Maternal Grandmother     Heart Attack Maternal Grandfather     Diabetes Maternal Grandfather     Hypertension Maternal Grandfather     Heart Disorder Maternal Grandfather         heart attack    Diabetes Paternal Grandmother     Diabetes Paternal Grandfather     Cancer Maternal Aunt 57        lung cancer       She is not of Ashkenazi [place]anda[place] ancestry.    Social History:  History   Alcohol Use Not Currently       History   Smoking Status    Never   Smokeless Tobacco    Never     Ms. Lorelei Clemente is  with 4 children. She has 7 siblings. She is currently Employed full-time    Review of Systems:  General:   The patient denies, fever, chills, night sweats, fatigue, generalized weakness, change in appetite or weight loss.    HEENT:     The patient denies eye irritation, cataracts, redness, glaucoma, yellowing of the eyes, change in vision, color blindness, or +wearing contacts/glasses. The patient denies hearing loss, ringing in the ears, ear drainage, earaches, nasal congestion, nose bleeds, +snoring, pain in mouth/throat, hoarseness, change in voice, facial trauma.    Respiratory:  The patient denies chronic cough, phlegm, hemoptysis, pleurisy/chest pain, pneumonia, asthma, wheezing, difficulty in breathing with exertion, emphysema, chronic bronchitis, shortness of breath or abnormal sound when breathing.     Cardiovascular:  There is no history of chest pain, chest pressure/discomfort, palpitations, irregular heartbeat, fainting or near-fainting, difficulty breathing when lying flat, SOB/Coughing at night, swelling of the  legs or chest pain while walking.    Breasts:  See history of present illness    Gastrointestinal:     There is no history of difficulty or pain with swallowing, reflux symptoms, vomiting, dark or bloody stools, constipation, yellowing of the skin, indigestion, nausea, change in bowel habits, diarrhea, abdominal pain or vomiting blood.     Genitourinary:  The patient denies frequent urination, needing to get up at night to urinate, urinary hesitancy or retaining urine, painful urination, urinary incontinence, decreased urine stream, blood in the urine or vaginal/penile discharge.    Skin:    The patient denies rash, itching, skin lesions, dry skin, change in skin color or change in moles.     Hematologic/Lymphatic:  The patient denies easily bruising or bleeding or persistent swollen glands or lymph nodes.     Musculoskeletal:  The patient denies muscle aches/pain, joint pain, stiff joints, neck pain, back pain or bone pain.    Neuropsychiatric:  There is no history of migraines or severe headaches, seizure/epilepsy, speech problems, coordination problems, trembling/tremors, fainting/black outs, dizziness, memory problems, loss of sensation/numbness, problems walking, weakness, tingling or burning in hands/feet. There is no history of abusive relationship, bipolar disorder, sleep disturbance, anxiety, depression or feeling of despair.    Endocrine:    There is no history of poor/slow wound healing, weight loss/gain, fertility or hormone problems, cold intolerance, +thyroid disease.     Allergic/Immunologic:  There is no history of hives, hay fever, angioedema or anaphylaxis.    /79 (BP Location: Left arm, Patient Position: Sitting, Cuff Size: adult)   Pulse 83   Resp 16   Wt 74.8 kg (165 lb)   LMP 05/28/2012 (LMP Unknown)   SpO2 99%   BMI 27.04 kg/m²     Physical Exam:  The patient is an alert, oriented, well-nourished and  well-developed woman who appears her stated age. Her speech patterns and  movements are normal. Her affect is appropriate.    HEENT: The head is normocephalic. The neck is supple. The thyroid is not enlarged and is without palpable masses/nodules. There are no palpable masses. The trachea is in the midline. Conjunctiva are clear, non-icteric.    Chest: The chest expands symmetrically. The lungs are clear to auscultation.    Heart: The rhythm is regular.  There are no murmurs, rubs, gallops or thrills.    Breasts:  Her breasts are symmetrical with a cup size 38D.  Right breast: The skin, nipple ,and areola appear normal. There is no skin dimpling with movement of the pectoralis. There is no nipple retraction. No nipple discharge can be elicited. The parenchyma is mildly nodular. There are no dominant masses in the breast. The axillary tail is normal.  There is a well-healed incision with no signs of infection.  Left breast:   The skin, nipple, and areola appear normal. There is no skin dimpling with movement of the pectoralis. There is no nipple retraction. No nipple discharge can be elicited. The parenchyma is mildly nodular. There are no dominant masses in the breast. The axillary tail is normal.  There is a well-healed incision with no signs of clinical concern.    Abdomen:  The abdomen is soft, flat and non tender. The liver is not enlarged. There are no palpable masses.    Lymph Nodes:  The supraclavicular, axillary and cervical regions are free of significant lymphadenopathy.    Back: There is no vertebral column tenderness.    Skin: The skin appears normal. There are no suspicious appearing rashes or lesions.    Extremities: The extremities are without deformity, cyanosis or edema.    Impression:   Ms. Lorelei Clemente is a 49 year old woman presents with biopsy confirmed left breast radial scar and papilloma, s/p excisional biopsy with benign pathology new diagnosis of right breast fibroepithelial lesion that is post excision.    Recommendations:   I had a discussion with  the Patient regarding her breast exam.  She is healing well since surgery with no signs of concern.  I reviewed the recent pathology that showed a fibroadenoma.  She has an additional area of concern in the breast that was recommended for surveillance and therefore we will plan for a bilateral diagnostic evaluation in February 2026 for this purpose with a clinical exam to follow.  She was given ample opportunity for questions and those questions were answered to her satisfaction. She was encouraged to contact the office with any questions or concerns prior to her next scheduled appointment.

## 2025-07-04 DIAGNOSIS — G43.109 MIGRAINE WITH AURA AND WITHOUT STATUS MIGRAINOSUS, NOT INTRACTABLE: ICD-10-CM

## 2025-07-08 RX ORDER — SUMATRIPTAN SUCCINATE 25 MG/1
TABLET ORAL
Qty: 9 TABLET | Refills: 1 | Status: SHIPPED | OUTPATIENT
Start: 2025-07-08

## 2025-07-10 NOTE — PROGRESS NOTES
Breast Surgery Post-Operative Visit    Diagnosis: Left breast, radial scar/papilloma, s/p excisional biopsy on 3/7/2024    Stage: N/A    Disease Status:  Surgical treatment complete, no further treatment pending    History of Present Illness:   Ms. Lorelei Clemente is a 48 year old woman who presents with imaging detected concerns of the left breast.  The patient denies any self detected clinical symptoms including palpable masses, nipple discharge, skin changes or axillary symptoms.  She does not have any known family history of breast cancer.  She has no personal prior history of breast disease or biopsies.  She had her screening mammogram January 4, 2024 and was found to have new asymmetry found in her breast bilaterally.  She had a bilateral diagnostic evaluation on January 9, 2024 and was found to have 2 areas at 7:00 in the left breast correspond to irregular nodules measuring 1 cm and 5 mm respectively which were determined indeterminant for which biopsies were recommended with otherwise benign appearing findings.  The 2 site left breast ultrasound-guided biopsy took place on January 12, 2024 and confirmed fragments of radial scar at both locations with an additional finding of papilloma at one of the sites. She underwent left breast excisional biopsy, which occurred without complication. She is here for postoperative visit. She reports her pain is under control. She denies erythema, warmth, drainage, or fevers. She is here today for evaluation and recommendations for further therapy.        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    She has cumulative breastfeeding history of 8 months.  She achieved menarche at age 15 and LMP 10/1/2016  Age of Menopause: 40  Type: hysterectomy with ovaries left in  She denies any history of hormone replacement therapy   She has history of oral contraceptive use for 2 years, last in .  She denies infertility treatment to achieve pregnancy.    Medications:     metFORMIN 500 MG Oral Tab Take 1 tablet (500 mg total) by mouth 2 (two) times daily with meals. 30 tablet 3    levothyroxine 50 MCG Oral Tab Take 1 tablet (50 mcg total) by mouth before breakfast. 90 tablet 1    diclofenac 75 MG Oral Tab EC Take 1 tablet (75 mg total) by mouth daily as needed (pain). 30 tablet 1    ondansetron (ZOFRAN) 4 mg tablet Take 1 tablet (4 mg total) by mouth every 8 (eight) hours as needed for Nausea. 30 tablet 1    ALPRAZolam (XANAX) 0.25 MG Oral Tab Take 1 tablet (0.25 mg total) by mouth nightly as needed. 10 tablet 0    buPROPion  MG Oral Tablet 24 Hr Take 1 tablet (300 mg total) by mouth daily. 90 tablet 3    SUMAtriptan 25 MG Oral Tab TAKE 1 TABLET BY MOUTH AS NEEDED FOR MIGRAINE. MAY REPEAT IN 2 HOURS IF NEEDED(MAX 2 TABLETS PER DAY) AS DIRECTED 9 tablet 1    Ciclopirox 8 % External Solution Apply to affected nail nightly 6 mL 1    naproxen 500 MG Oral Tab Take 1 tablet (500 mg total) by mouth 2 (two) times daily as needed. 60 tablet 1       Allergies:    Allergies   Allergen Reactions    Shrimp TONGUE SWELLING       Family History:   Family History   Problem Relation Age of Onset    Diabetes Mother     Hypertension Mother     Lipids Mother     Other (Other) Mother         thyroid disease    Diabetes Maternal Grandmother     Hypertension Maternal Grandmother     Heart Attack Maternal Grandfather      Diabetes Maternal Grandfather     Hypertension Maternal Grandfather     Heart Disorder Maternal Grandfather         heart attack    Diabetes Paternal Grandmother     Diabetes Paternal Grandfather     Cancer Maternal Aunt 57        lung cancer       She is not of Ashkenazi Hindu ancestry.    Social History:  History   Alcohol Use No       History   Smoking Status    Never   Smokeless Tobacco    Never     Ms. Lorelei Clemente is  with 4 children. She has 7 siblings. She is currently Employed full-time    Review of Systems:  General:   The patient denies, fever, chills, night sweats, fatigue, generalized weakness, change in appetite or weight loss.    HEENT:     The patient denies eye irritation, cataracts, redness, glaucoma, yellowing of the eyes, change in vision, color blindness, or +wearing contacts/glasses. The patient denies hearing loss, ringing in the ears, ear drainage, earaches, nasal congestion, nose bleeds, +snoring, pain in mouth/throat, hoarseness, change in voice, facial trauma.    Respiratory:  The patient denies chronic cough, phlegm, hemoptysis, pleurisy/chest pain, pneumonia, asthma, wheezing, difficulty in breathing with exertion, emphysema, chronic bronchitis, shortness of breath or abnormal sound when breathing.     Cardiovascular:  There is no history of chest pain, chest pressure/discomfort, palpitations, irregular heartbeat, fainting or near-fainting, difficulty breathing when lying flat, SOB/Coughing at night, swelling of the legs or chest pain while walking.    Breasts:  See history of present illness    Gastrointestinal:     There is no history of difficulty or pain with swallowing, reflux symptoms, vomiting, dark or bloody stools, constipation, yellowing of the skin, indigestion, nausea, change in bowel habits, diarrhea, abdominal pain or vomiting blood.     Genitourinary:  The patient denies frequent urination, needing to get up at night to urinate, urinary hesitancy or  retaining urine, painful urination, urinary incontinence, decreased urine stream, blood in the urine or vaginal/penile discharge.    Skin:    The patient denies rash, itching, skin lesions, dry skin, change in skin color or change in moles.     Hematologic/Lymphatic:  The patient denies easily bruising or bleeding or persistent swollen glands or lymph nodes.     Musculoskeletal:  The patient denies muscle aches/pain, joint pain, stiff joints, neck pain, back pain or bone pain.    Neuropsychiatric:  There is no history of migraines or severe headaches, seizure/epilepsy, speech problems, coordination problems, trembling/tremors, fainting/black outs, dizziness, memory problems, loss of sensation/numbness, problems walking, weakness, tingling or burning in hands/feet. There is no history of abusive relationship, bipolar disorder, sleep disturbance, anxiety, depression or feeling of despair.    Endocrine:    There is no history of poor/slow wound healing, weight loss/gain, fertility or hormone problems, cold intolerance, +thyroid disease.     Allergic/Immunologic:  There is no history of hives, hay fever, angioedema or anaphylaxis.    /84 (BP Location: Left arm, Patient Position: Sitting, Cuff Size: adult)   Pulse 82   Temp 97.7 °F (36.5 °C) (Temporal)   Resp 16   Ht 1.664 m (5' 5.5\")   Wt 87.1 kg (192 lb)   LMP 05/28/2012 (LMP Unknown)   SpO2 97%   BMI 31.46 kg/m²     Physical Exam:  The patient is an alert, oriented, well-nourished and  well-developed woman who appears her stated age. Her speech patterns and movements are normal. Her affect is appropriate.    HEENT: The head is normocephalic. The neck is supple. The thyroid is not enlarged and is without palpable masses/nodules. There are no palpable masses. The trachea is in the midline. Conjunctiva are clear, non-icteric.    Chest: The chest expands symmetrically. The lungs are clear to auscultation.    Heart: The rhythm is regular.  There are no  murmurs, rubs, gallops or thrills.    Breasts:  Her breasts are symmetrical with a cup size 38D.  Right breast: The skin, nipple ,and areola appear normal. There is no skin dimpling with movement of the pectoralis. There is no nipple retraction. No nipple discharge can be elicited. The parenchyma is mildly nodular. There are no dominant masses in the breast. The axillary tail is normal.  Left breast:   The skin, nipple, and areola appear normal. There is no skin dimpling with movement of the pectoralis. There is no nipple retraction. No nipple discharge can be elicited. The parenchyma is mildly nodular. There are no dominant masses in the breast. The axillary tail is normal.  There is a well-healed incision with no signs of infection.    Abdomen:  The abdomen is soft, flat and non tender. The liver is not enlarged. There are no palpable masses.    Lymph Nodes:  The supraclavicular, axillary and cervical regions are free of significant lymphadenopathy.    Back: There is no vertebral column tenderness.    Skin: The skin appears normal. There are no suspicious appearing rashes or lesions.    Extremities: The extremities are without deformity, cyanosis or edema.    Impression:   Ms. Lorelei Clemente is a 48 year old woman presents with biopsy confirmed left breast radial scar and papilloma, s/p excisional biopsy with benign pathology.    Recommendations:   I had a discussion with the Patient regarding her breast exam.  She is healing well since surgery with no signs of infection. I reviewed her pathology.  This demonstrated benign findings.  This does not increase future breast cancer risk and warrants no additional intervention.  Plan for bilateral diagnostic evaluation in January 2025 with a clinical exam to follow.  If this is within normal limits she will resume annual screening the following year.  She was given ample opportunity for questions and those questions were answered to her satisfaction. She was  encouraged to contact the office with any questions or concerns prior to her next scheduled appointment.      none

## 2025-07-11 ENCOUNTER — HOSPITAL ENCOUNTER (OUTPATIENT)
Dept: BONE DENSITY | Age: 50
Discharge: HOME | End: 2025-07-11
Attending: PLASTIC SURGERY
Payer: COMMERCIAL

## 2025-07-11 ENCOUNTER — HOSPITAL ENCOUNTER (OUTPATIENT)
Dept: CT IMAGING | Age: 50
End: 2025-07-11
Attending: PLASTIC SURGERY
Payer: COMMERCIAL

## 2025-07-11 ENCOUNTER — LAB ENCOUNTER (OUTPATIENT)
Dept: LAB | Age: 50
End: 2025-07-11
Attending: PLASTIC SURGERY
Payer: COMMERCIAL

## 2025-07-11 DIAGNOSIS — Q76.6 OTHER CONGENITAL MALFORMATIONS OF RIBS: ICD-10-CM

## 2025-07-11 DIAGNOSIS — Z00.00 ROUTINE GENERAL MEDICAL EXAMINATION AT A HEALTH CARE FACILITY: ICD-10-CM

## 2025-07-11 DIAGNOSIS — E03.8 OTHER SPECIFIED HYPOTHYROIDISM: ICD-10-CM

## 2025-07-11 LAB
ALBUMIN SERPL-MCNC: 4.6 G/DL (ref 3.2–4.8)
ALBUMIN/GLOB SERPL: 1.4 {RATIO} (ref 1–2)
ALP LIVER SERPL-CCNC: 65 U/L (ref 39–100)
ALT SERPL-CCNC: 9 U/L (ref 10–49)
ANION GAP SERPL CALC-SCNC: 6 MMOL/L (ref 0–18)
AST SERPL-CCNC: 12 U/L (ref ?–34)
BILIRUB SERPL-MCNC: 0.3 MG/DL (ref 0.3–1.2)
BUN BLD-MCNC: 13 MG/DL (ref 9–23)
CALCIUM BLD-MCNC: 9.8 MG/DL (ref 8.7–10.6)
CHLORIDE SERPL-SCNC: 110 MMOL/L (ref 98–112)
CO2 SERPL-SCNC: 27 MMOL/L (ref 21–32)
CREAT BLD-MCNC: 0.96 MG/DL (ref 0.55–1.02)
EGFRCR SERPLBLD CKD-EPI 2021: 73 ML/MIN/1.73M2 (ref 60–?)
FASTING STATUS PATIENT QL REPORTED: YES
GLOBULIN PLAS-MCNC: 3.3 G/DL (ref 2–3.5)
GLUCOSE BLD-MCNC: 120 MG/DL (ref 70–99)
OSMOLALITY SERPL CALC.SUM OF ELEC: 297 MOSM/KG (ref 275–295)
POTASSIUM SERPL-SCNC: 4.6 MMOL/L (ref 3.5–5.1)
PROT SERPL-MCNC: 7.9 G/DL (ref 5.7–8.2)
SODIUM SERPL-SCNC: 143 MMOL/L (ref 136–145)
TSI SER-ACNC: 1.96 UIU/ML (ref 0.55–4.78)

## 2025-07-11 PROCEDURE — 84443 ASSAY THYROID STIM HORMONE: CPT

## 2025-07-11 PROCEDURE — 36415 COLL VENOUS BLD VENIPUNCTURE: CPT

## 2025-07-11 PROCEDURE — 80053 COMPREHEN METABOLIC PANEL: CPT

## 2025-07-11 PROCEDURE — 77080 DXA BONE DENSITY AXIAL: CPT | Performed by: PLASTIC SURGERY

## 2025-07-11 NOTE — PROGRESS NOTES
[facility] Weight Management follow up via video visit:    Subjective    This visit is conducted using Telemedicine with live, interactive video and audio.    Chief Complaint:  Routine follow up visit for lifestyle and medical management for overweight, obesity, or morbid obesity. LOV in clinic weight: 168#.    PMH reviewed. Bariatric surgery planned or hx of surgery in the past: no.    HPI:   Lorelei Clemente is a 49 year old female who is being followed up today for lifestyle and medical management as deemed appropriate for overweight, obesity or morbid obesity. Patient reports weight loss monitoring at home via scale with a weight of 161#. This appears to be a weight loss since LOV 3 months ago in clinic. Patient has been consistent with medication and tolerating increased dose of Topamax without SEs.    Questions/Concerns/Comments since LOV: Has never seen the dietician. High stress with sudden move to [facility] d/t spouse job change in August. Will be back in the area to visit family and likely to continue care if possible.    Lifestyle/Social Hx Reviewed:    [facility] Weight Loss Follow Up    Question 7/14/2025  1:19 PM CDT - Filed by Patient   Please describe a success moment: Not gaining weight back   Please describe a challenging moment/needs for improvement: Eating more vegetables   Please complete this 24 hour food journal, listing everything you had to eat in the past day. Include the average time of day you ate these meals at    List foods, qty and prep for breakfast: 1 egg, 1/2 cup yogurt, 1 piece of toast   List foods, qty and prep for lunch. 1 cup stew, 1 or 2 corn tortillas.   List foods, qty and prep for dinner. 4 oz animal protein grill, 1 cup side of vegestables.   List foods, qty and prep for snacks. Banana, Orange, cup of peanuts, or other  nuts   List the types and qty of fluids consumed Coffe, tea, juice, water, nectar.   On average, how many meals did you eat out per  week? 1   Exercise    How many days per week are you active or exercise 5   On average, how many days were anaerobic (strength/resistance) exercises performed? 4   On average, how many days were aerobic (cardio) exercises performed? 2   Perceived level of exertion on a scale of 1-5, with 5 being very intense: 2   Stress    Average stress level on a scale of 1-10, with 10 being extremely stressed: 6   If greater than 5/1O how would you grade your coping mechanisms? moderate   Sleep hours and integrity    How many hours of uninterrupted sleep do you get a night: 7   Do you feel rested in the morning: Yes   If no, what may have been disrupting your sleep?    Please list any goal(s) for your next visit Lost 5 to 10 pounds       ROS  General: feeling well, denies fatigue  EYES: denies vision changes or high pain/pressure.  CV: denies cp, palpitations  Resp: denies sob  GI: denies abdominal pain. Denies N/V/D/C.  Neuro: denies paresthesia or cognitive changes. Migraines fair control.  Psych: denies any mood changes    Physical Exam:  >   BP Readings from Last 3 Encounters:   05/20/25 122/79   05/12/25 95/62   04/16/25 106/74       Home weight: see above  Home BP: not available  Home blood sugars: n/a    General: patient speaking in full sentences, no increased work of breathing. alert, appears stated age, cooperative, and no distress  HENT: normocephalic  Resp: Breathing is non labored  Psych: patient appears cheerful, smiling, making good eye contact    Diagnoses and all orders for this visit:    Encounter for therapeutic drug monitoring  -     topiramate 100 MG Oral Tab; Take 1 tablet (100 mg total) by mouth 2 (two) times daily.  -     Phentermine HCl 15 MG Oral Cap; Take 1 capsule (15 mg total) by mouth every morning.    Overweight (BMI 25.0-29.9)  -     topiramate 100 MG Oral Tab; Take 1 tablet (100 mg total) by mouth 2 (two) times daily.  -     Phentermine HCl 15 MG Oral Cap; Take 1 capsule (15 mg total) by mouth  every morning.    Migraine with aura and without status migrainosus, not intractable  -     topiramate 100 MG Oral Tab; Take 1 tablet (100 mg total) by mouth 2 (two) times daily.    History of obesity  Comments:  Baseline BMI: 32.78 (2/12/24)  Orders:  -     topiramate 100 MG Oral Tab; Take 1 tablet (100 mg total) by mouth 2 (two) times daily.  -     Phentermine HCl 15 MG Oral Cap; Take 1 capsule (15 mg total) by mouth every morning.        Patient Instructions   Continue making lifestyle changes that focus on good nutrition, regular exercise and stress management.    Medication Plan: Continue medication at current dose. Refilled at 90 day supply.    Next steps to work on before next visit include:  Keep up the great work! Best of luck with the move. Give yourself chris at this time and maintain some baseline goals such has eating out 2 meals/week max and maintaining regular exercise at 2x/week minimum.    Find a healthcare provider in your area who specializes in the diease of obesity and weight management. Go to www.obesitycareproviders.com       I recommend self monitoring to support behavior change and to learn how your environment individually impacts YOUR body. This will help promote intuitive eating practices. Goal is to track nutrition 2x/week via paper log or using an michell such as Wellcoin (www.Fight My Monster.com) or LoseIT!. I also advise checking and logging a weekly home scale weight. Support via our counseling and dietician teams are available with a referral. Please let me know if this individualized care is desired.      Self-Monitoring - The Way to Successful Weight Management    By Demetra Stephens RD, LDN, Ruthy Zambrano RD, CHHAYAN, Jaspreet Sánchez PA-C, and Noemi Kamara MD    OAC www.obesityaction.org Winter 2008 Resource    One major and possibly most important behavioral interventional strategy for weight management and lifestyle change is self-monitoring. Behavioral interventions are a central aspect  in treatments to promote lifestyle changes that lead to weight-loss, prevent weight gain or weight regain and improve physical fitness. In the past, self-monitoring has unfortunately been one of the least popular techniques for those in weight management programs, and in some cases it is even thought of as a punishment. Because self-monitoring is critical for success with lifestyle changes, it is important to look at the various self monitoring techniques.    What is Self-monitoring?  Self-monitoring refers to the observing and recording of eating and exercise patterns, followed by feedback on the behaviors. The goal of self-monitoring is to increase self-awareness of target behaviors and outcomes, thus it can serve as an early warning system if problems are arising and can help track success. Some commonly used self-monitoring techniques include:    Food diaries  Regular self-weighing  Exercise logs  Equipment such as pedometers, accelerometers and metabolic devices  Food Logs and Diaries  One of the most common and important types of self-monitoring strategies in weight management programs is keeping a food log, in which individuals record foods, exercises or beverages as soon as they are consumed.    One important technique with food logs is individuals recording what they eat or drink as it is consumed; otherwise it may not give an accurate account of the day’s intake. A good “rule of thumb” for food logs is: “if you bite it, you write it!”    The minimum information for weight-loss that should be kept in food logs is type, amount and caloric content of food or beverage consumed. This provides the ability to track and balance the number of calories consumed throughout the day with the amount of calories expended throughout the day.    Other nutritional information that can be logged includes: time of day of eating, fat content and carbohydrate grams. Disease-specific food logs can also be kept. For example:  focusing on carbohydrate content instead of calories for patients with diabetes or insulin resistance.    Food Diaries  Another helpful tool in self-monitoring is keeping a food diary. Food diaries differ from food logs because they include more detailed information. They are useful if you are trying to find behavioral reasons or psychological aspects for eating.    Depending on the person and behavioral complexities involved, some food diaries could include the stress level, mood or feelings surrounding eating, activity or location or other environmental or emotional triggers for eating. The more complex or detailed, the better the feedback.    However, in today’s society it is almost impossible for most people to keep highly detailed daily food records over the long-term, therefore, compliance is often very low with detailed food diaries. By suggesting that patients keep a detailed food record for a few days each week, perhaps major areas of focus for nutritional and behavioral intervention can be recognized.    Logging Your Food Online  Online food logs and diaries or computer software are quick and convenient ways to keep records of foods consumed in our technologically advanced world. Many Web sites are available for tracking of foods and calories throughout the day, some of which are free and very easy to use.    You can look up food choices and/or alternative choices in online databases of more than 50,000 foods. Internet-savvy loggers may choose to keep their journals online. Others may just choose to use these databases as a more convenient way of looking up nutritional value of foods. Some free online diaries include:    Free Web sites for searching nutritional information are available, an example is www.[employer]ag[employer].Liquor.com. These Web sites may also offer exercise tracking and ideas, support, motivational tips and chat or discussion rooms.    Hand-held Calorie Counters  Another option for those who are “on  the go” are handheld devices for calorie counting. Some of the devices are stand-alone such as Partlyt® or True Sol Innovationser®. Others need to connect to Web sites. Other devices are installed in your Palm or Pocket-PC such as Diet Diary by Raffaele Duarte. They let you download updates when nutrition facts change, however, some of them use a lot of memory.    Regular Weighing  Weighing yourself is an important and simple self-monitoring behavior to serve as reminder of one’s eating and physical activity habits. Although it may be hard and sometimes discouraging to weigh yourself while losing weight, it is recommended to weigh yourself weekly, preferably outside of the home on the same scale.    Using the scale at the local gym or exercise facility or your doctor’s office may be more accurate than home scales. However, if this is unrealistic, it is okay to use a home scale. Try to weigh yourself at the same time of day and the same day of the week.    Writing down your weekly weights on a table, graph or calendar can help you keep track of your success or to help you get back on track more quickly. It is important to note that weighing yourself more frequently than weekly is not recommended, as day to day fluctuations are not indicators of actual weight. Regular monitoring of your weight is also essential to help you maintain your weight after losing weight.    Exercise Logs  Another self-monitoring technique, along the same lines as food logs and diaries, is keeping an exercise log or diary. The number of minutes engaged and type and level of exertion of physical activity should ideally be recorded.    An important and often forgotten aspect of exercise logs is the level of perceived exertion. Walking for 30 minutes, at an easy compared to a hard pace, will result in different levels of calories burned and cardiovascular impact.    Typically, an easy physical activity that does not increase heart rate much, or  alter breathing would usually be the pace that you walk around work or go shopping. Moderate level of physical exertion is when you are getting a mildly increased heart and breathing rate. Heavy or hard level of physical exertion would be sweating, increased heart rate (target heart rate range) as well as increased breathing.    Remember, physical activity can be done at one time or intermittently throughout the day. Logging exercise can be a positive feedback or a reminder to incorporate more exercise or physical activity into your daily routine.    Initial activities may be walking, riding a stationary bike or swimming at a slow pace. Other types of exercise that can be fun are dancing, exercise videos or chair exercises. You should try to aim for 30 minutes of exercise on most days of the week.    Many people try to start out with exercise on three or four days of the week. However, if you can get yourself exercising most to all days of the week, even if only for 10 or 15 minutes, it will become more of a routine for you.    Healthy Lifestyle Tip  All adults should set a long-term goal to accumulate at least 30 minutes or more of moderate-intensity physical activity on most to all days of the week. Also, try to increase activities of daily living such as taking the stairs instead of the elevator, parking further away or walking to a bathroom that is further from your desk. Reducing sedentary time is a good strategy to increase activity by undertaking frequent, less strenuous activities. With time, you may be able to engage in more strenuous activities.    Pedometers  Self-monitoring tools are becoming more and more popular and accurate. One of the simplest of these self-monitoring tools is a pedometer. Pedometers give objective data of physical activity throughout the day. Pedometers can be found in almost any consumer [employer] or retail store.    Some of the more popular manufactures include Digi-Walker, Besstech,  Ackiran, Arantech, [employer], SportSignostics, Freestyle, DAQRI, [employer] and many others. bSafe and People[employer] make pedometer of speedometer devices that calculate steps and speed using GPS. These clip-on devices are inexpensive, ranging from less than $15 up to $75.    Many people get an average of 3,000 steps per day with daily activity. In order to burn off extra calories for weight-loss, walking 10,000 steps per day is recommended. For regular health, a minimum of 6,000 steps per day is required. Research suggests that a deliberate walk of 4,000-6,000 steps will help with weight-loss. It is often a good idea to keep track of your daily steps taken in your exercise log.    Pedometers can be frustrating for those who are more interested in distance traveled. Focusing on the number of steps and ways to incorporate more steps throughout the day will make as much of a difference with weight-loss as actual distance does. Pedometers encourage people to find ways to add more steps throughout the day.    Because step counting is becoming more popular, advances are being made in the technology behind pedometers. New pedometers display steps and count them accurately. They are meant to be worn everyday and all day, as motivation to keep stepping, Most are small and comfortable to wear.    Pedometers sense your body motion, counting your footsteps usually by a turned pendulum technology, a coiled spring mechanism and a hairspring mechanism (which is the least accurate). The unit should be accurate in its count when you wear it correctly. You may need to experiment with where to wear it. You can measure your stride and then the pedometer can estimate distance traveled.    Some pedometers today offer multifunction options like calorie estimates, clocks, timers, stopwatches, speed estimators, seven-day memory or pulse rate readers, voice feedback and radios.    Accelerometers  Although pedometers are very cost-effective,  one of the main flaws in using pedometers, however, is that they do not record intensity (how hard) or duration (how long) or frequency (how often) movement occurs. Accelerometers are devices that can objectively measure frequency, duration and intensity of physical activity.    Accelerometers provide a high level of accuracy when assessing physical activity. There are a variety of commercially available accelerometers, or activity monitors, which come in a wide range of prices anywhere from $50 to $1,000. BioTrainer and Nike are examples of affordable accelerometers.    Many of the more expensive accelerometers are used only in research or as a part of a hospital-based program. These monitors are more complex than pedometers in that they display and store more complex data. Some are designed to download to a computer for analysis of intensity levels, movements and physical activity patterns. They can also be used to estimate calories burned or energy expenditure.    Accelerometers have sophisticated sensors that convert physical movement into an electrical signal that is relative to the muscular force needed to produce the work. Accelerometers can be found in uniaxial or triaxial measures. Uniaxial accelerometers measure in a single plane and can be attached to the trunk or limbs. Triaxial accelerometers measure along three planes: vertical, medial-lateral and anterior-posterior.    Although accelerometers are a step up from pedometers in accuracy of physical activity, they cannot register resistance. Therefore, if you are strength training or adding resistance to your bike or treadmill or adding an incline to your walking, it will not be able to discern the added level of energy required to do that work.    Metabolic Devices  One of the most accurate and most expensive tools for self-monitoring are tools that have very sophisticated monitoring and interpreting sensors for calories burned. Many of these devices  have options to subscribe to a Web-based calorie counter system that integrates the amount of calories burned measured by the equipment and your calories consumed that you enter in easy to use food logs.    These devices are more accurate in measurements of calories expended because they use not only accelerometer technology, but also heat flux sensors, galvanic skin response (to measure physical exertion and emotional stimuli) and skin temperature gauges. Some also include heart rate monitoring techniques. All of these technologies combined lead to a very accurate measurement of calories expended throughout the day.    These devices can determine if you are sitting, sleeping, jogging, walking, lifting weights or riding in a car. Many of these devices are very expensive and used primarily for research, however, some are available commercially.    This technology is also employed by hospital-based programs. Patients wear the hospital’s armband and track their nutrition on the Web site or computer-based program for typically one to two weeks. When they return to the clinic, the information will be uploaded and the practitioners will be able to work with the patients with objective data on metabolic lifestyle patterns.    Practitioners can also monitor patients on integrated software applications to provide consultations without being face to face. Practitioners have the ability to set daily goals to tailor programs to the individual patient. These are great tools to help objectively monitor behavior and physical activity, as well as providing real time feed back to the patient. [employer] is one company that offers this technology.    Conclusion  Although specific diseases and treatments vary, behavior modification is the major key in weight-loss or prevention and decreases the risk of diseases. Self-monitoring is a key to behavior modifications, and there are a multitude of ways to self-monitor. With technology  advancements, self-monitoring techniques are changing and improving to help defeat some of the major barriers to compliance. The bottom line is that no matter how you do it, self-monitoring should be an important part of your weight-loss, weight maintenance or healthy lifestyle change. Then, the next step is to be sure the self-monitoring translates into positive behavior changes with regards to diet and exercise.      Staying Healthy While Traveling    by Abiola Pena RD    OAC at www.obesityaction.org Summer 2023 Resource    It’s time to go on a vacation! Summertime is a popular season for travel, and vacations are a wonderful way to unwind, discover new places, and spend time with family and friends. Taking a break from your daily routine is really important. When you return, you’ll feel refreshed and ready for the next things you need to do! As you pack your bags and plan your trip, remember to stay focused on your health and wellness goals. With a bit of planning, it’s not hard to stay active, eat well, and have a great time during your vacation.    Do Your Research and Plan Ahead  Keeping yourself on track during a vacation might feel like an impossible challenge. However, with the right preparation, it’s entirely achievable. Before you leave, take some time to plan your vacation. Look for ways to include health and nutrition in your itinerary, such as trying new activities and foods. You’ll be pleasantly surprised to find that many places have great options for staying healthy.    Check your Nutrition  Instead of constantly eating sweets, fried foods and junk, seek out healthier alternatives. Choosing nutritious foods while traveling has many advantages. First, eating healthy will give you more energy, which is important for having a fun vacation. Second, sticking to your plan will give you a sense of accomplishment and satisfaction. Here are some tips to help you choose wisely and prioritize your  well-being.    Consider using grocery delivery services. You can shop online from home and have meals and snacks delivered to your hotel. Just make sure your room has a refrigerator. This way, you won’t have to search for a store and can avoid the temptation of unhealthy vacation treats. You can stock up on fresh produce, cheese sticks, popcorn and protein bars to keep in your hotel room.    Make eating out fun by finding new restaurants to try at your destination. Take a moment to look up their menus online and come up with a plan. You can plan to try fresh fish at a local beach restaurant, visit a steakhouse and choose a lean cut of meat with a side salad, or enjoy chicken fajitas at a Mexican restaurant.  Remember to give yourself a break. It’s okay to indulge in a special meal or snack during your vacation. One treat doesn’t mean the whole day is ruined. Just get back on track with your healthy eating at the next meal.    Finding Fitness  Discovering fun fitness activities can help you build a habit that you’ll want to continue. When you’re on vacation, make it a point to set aside time to move your body. This will not only increase your energy levels but also make you feel positive and satisfied about being active. Be creative and think outside the box to come up with exciting ideas to stay active during your vacation!    Try to find ways to move throughout the day. Look for trails in a local park, a gym at your hotel, or join an exercise class for the day. Keep things interesting by varying your activities and trying something new.  Involve the whole family in staying active. Rent bikes for an afternoon, plan a walk after dinner, or try something different like surfing! You can also let your kids choose an activity. You might be surprised to hear what ideas they have!    Mind Your Mental Health  Vacations are meant to be relaxing and a chance to recharge. Make sure you have a vacation that helps you rest and  rejuvenate! Sometimes it’s easy to plan too many activities and take on too many commitments. Take a moment to find balance between fun and fitness.    Pack items you enjoy. Bring your favorite books, puzzles, music, or whatever you love to ensure you have time to do just that. Sneaking away for a few minutes can give you time to spend on your favorite things.  Remember to rest. Vacations can be full of exciting things, but it’s also important to take it easy. Take an afternoon nap or find some quiet time alone to recharge.    Include activities that you enjoy. Vacations offer a lot of things to do, so make sure you have activities that you personally enjoy along with the rest of the family.    How to: Make it Through a Long Day of Travel  Whether you’re taking a road trip for a few hours or a plane across the [place] travel days require some planning. Here are some tips to make your trip easier.    Pack a snack bag for long road trips or airport days. When in the car, consider bringing a cooler with water to avoid sugary snacks at the gas stations. Pack fresh fruits, vegetables, water, cheese and meats. Include snacks like popcorn, pretzels or whole-grain crackers. Airline travel can be more challenging, but you can pack snacks such as protein bars, trail mix or popcorn in your carry-on. Bring a refillable water bottle to stay hydrated throughout the day!    Find ways to walk, even on your busiest travel day. If you’re driving, take quick 10-15-minute walks every couple of hours. It will not only give you some cardio exercise but also reduce stress and improve your mood during long car rides. If you’re stuck in an airport, walk up and down the terminal while you wait.  Bring items like books, adult coloring books or knitting supplies to keep busy and reduce screen time. This can be a great opportunity to explore a new hobby and let your creative juices flow.  How to: Make Healthy Food Choices When Eating Out  Making  healthy food choices while eating out on vacation can be challenging.    Instead of donuts and pastries for breakfast, go for high-protein items like eggs, turkey sausage, turkey gonzalez, oats and whole-grain toast. Ask for fresh fruit or low-fat yogurt on the side to make breakfast even more nutritious.  Restaurant portions can be large. If someone in your group is willing to split a meal, that can be an easy solution. Since you might not have enough space to take leftovers with you, you may have to leave some behind.  When looking at the lunch or dinner menu, choose grilled, baked or boiled options. This can save you a lot of calories and fat. Be mindful of side dishes, as they can add up quickly. Consider swapping a high-calorie side item for a side salad, fruit or vegetables.    Desserts can be tempting, but plan ahead if you want to order one. Choose a lower-calorie meal to balance it out. You can also share the dessert with others at your table to enjoy a smaller portion.  How to: Deal With an unexpected change of plans  We’ve all been there. Your perfectly planned vacation goes sideways. How do you make the most of these days? It can be easy to throw in the towel, but always be ready with a backup plan.    The weather can change unexpectedly. A rainy day might ruin your beach or mountain hike plans. Take on the challenge and find new ways to have fun. Look for an indoor pool or an activity center to keep working towards your goals!    Your carefully chosen restaurant may not have healthy options, or your grocery store delivery might be late. Don’t give up; just adapt and make a new decision. Make the best choice you can and move on to the next meal. It’s okay if it’s not perfect!    Sometimes your perfectly planned day doesn’t go as expected. Kids may start fighting during a trip to the park, or someone might get sick on vacation. Focus on the positive moments and enjoy the time you have.  Wherever you go on  your summer vacation, make sure to enjoy the break, relaxation and adventure. Taking time away from your usual routine can be amazing, and keeping up with your health and fitness goals can make your vacation even better.      Healthy Tips for Eating Out  Choices  It’s not easy to change the habits of a lifetime. Experts say that it can take 6 months just to change one old habit for a healthier one. That’s why gradual change is so important. These tips are reminders of the dozens of small ways you can change your habits when eating out. Don’t expect to follow each tip all the time. Think of the tips as options for handling situations that may have given you trouble in the past.  You don't have to give up eating out to cut down on fat, cholesterol, and salt. You just need to think about what you order. Many menus highlight low-fat and low-sodium dishes. But if you can't find what you want, ask. Explain what you need to the  or . Or ask to see printed nutrition information.    Ask for what you want  Ask that foods be prepared with little or no fat and with no added salt.  Ask that sauces be left off or served on the side. Choose sauces made with tomato instead of with cream or cheese.  Ask for steamed rice or a baked or boiled potato, without butter or sour cream.  Ask that vegetables be steamed and served with no butter or sauce. Ask for lemon juice or vinegar to sprinkle on them for flavor.  Keep these tips in mind  Choose minestrone or vegetable soups. Ask about sodium content.  Order salad dressing on the side. Dip your fork in the dressing, then in the salad.  Look for fish, chicken, turkey, or meat that is broiled, roasted, poached, or steamed.  Order 1 or 2 low-fat appetizers or soup and a salad instead of a main dish. Or eat only half of the main dish and take the rest home.  If you want a dessert, try fresh fruit, nonfat yogurt, or sorbet. Or share a dessert.  Fast food tips  Choose grilled  chicken sandwiches and plain hamburgers. Add vegetables to your burgers and sandwiches and go lightly on creamy sauces like mayonnaise and tartar sauce. Choose whole-grain buns or bread when available.  Remove the skin from fried chicken and choose mashed potatoes, corn on the cob, and baked beans on the side.  Order a broiled chicken salad and pile on fresh vegetables from the salad bar. Use a small amount of low-fat dressing.  Top a baked potato with cottage cheese and vegetables from the salad bar.  Ask for a pizza topped with vegetables.  Watch portion size. Order regular size meals, not super-sized. A kid's meal may be enough and may have healthier options for sides.  Stay away from milkshakes, sugary sodas, and sweetened drinks. Instead choose low-fat or skim milk, water, unsweetened ice tea, and sparkling water.  Restaurant tips  Plan ahead. Decide what you will eat before you get to the restaurant. If you plan to eat high-fat foods, choose low-fat foods during the rest of the day.  Don’t be afraid to ask how foods are prepared and whether they can be done without high-fat ingredients, such as cream, butter, cheese, and oil.  Ask for sauces and salad dressings on the side and use just a tablespoon. Ask for low-fat dressings.  Try starting your meal with a bowl of vegetable soup or a salad. This may help to prevent you from overeating during the meal.  Order meat, poultry, and fish broiled, grilled, baked, poached, or steamed. Always remove the skin from chicken.  Choose Mexican dishes made with soft, rather than crispy tortillas. For toppings, use salsa and lettuce, rather than sour cream and cheese.  For dessert, enjoy fruit, sorbet, or low-fat frozen yogurt. Share a rich dessert with friends.  Make a meal out of a low-fat appetizer (such as shrimp cocktail or fresh pasta), bread, and salad as your main meal. Ask for an appetizer-size portion of an entree.    Foods to avoid  Donuts, muffins, and  pastries  Coconut, vegetables with butter, cream, or cheese sauce  Cream, whole milk, and powdered creamers  Stack, liver, luncheon meats, ground meat, and canned fish in oil  Sweets and foods made with butter, coconut or palm oil, or hydrogenated fats    © 2748-5449 [employer]. [address] [place] [address]. All rights reserved. This information is not intended as a substitute for professional medical care. Always follow your healthcare professional's instructions.      Return in about 4 months (around 11/14/2025) for as scheduled in clinic only.    DOCUMENTATION OF TIME SPENT: Code selection for this visit was based on time spent : 20 minutes on date of service in preparing to see the patient, obtaining and/or reviewing separately obtained history, performing a medically appropriate examination, counseling and educating the patient/family/caregiver, ordering medications or testing, referring and communicating with other healthcare providers, documenting clinical information in the electronic medical record, independently interpreting results and communicating results to the patient/family/caregiver and care coordination with the patient's other providers.    Answers submitted by the patient for this visit:  Medical Weight Loss Follow Up (Submitted on 7/14/2025)  If greater than 5/1O how would you grade your coping mechanisms?: moderate

## 2025-07-14 ENCOUNTER — TELEMEDICINE (OUTPATIENT)
Dept: INTERNAL MEDICINE CLINIC | Facility: CLINIC | Age: 50
End: 2025-07-14
Payer: COMMERCIAL

## 2025-07-14 DIAGNOSIS — E66.3 OVERWEIGHT (BMI 25.0-29.9): ICD-10-CM

## 2025-07-14 DIAGNOSIS — G43.109 MIGRAINE WITH AURA AND WITHOUT STATUS MIGRAINOSUS, NOT INTRACTABLE: ICD-10-CM

## 2025-07-14 DIAGNOSIS — Z51.81 ENCOUNTER FOR THERAPEUTIC DRUG MONITORING: Primary | ICD-10-CM

## 2025-07-14 DIAGNOSIS — Z86.39 HISTORY OF OBESITY: ICD-10-CM

## 2025-07-14 RX ORDER — TOPIRAMATE 100 MG/1
100 TABLET, FILM COATED ORAL 2 TIMES DAILY
Qty: 180 TABLET | Refills: 1 | Status: SHIPPED | OUTPATIENT
Start: 2025-07-14

## 2025-07-14 RX ORDER — PHENTERMINE HYDROCHLORIDE 15 MG/1
15 CAPSULE ORAL EVERY MORNING
Qty: 90 CAPSULE | Refills: 1 | Status: SHIPPED | OUTPATIENT
Start: 2025-07-14

## 2025-07-14 NOTE — PATIENT INSTRUCTIONS
Continue making lifestyle changes that focus on good nutrition, regular exercise and stress management.    Medication Plan: Continue medication at current dose. Refilled at 90 day supply.    Next steps to work on before next visit include:  Keep up the great work! Best of luck with the move. Give yourself chris at this time and maintain some baseline goals such has eating out 2 meals/week max and maintaining regular exercise at 2x/week minimum.    Find a healthcare provider in your area who specializes in the diease of obesity and weight management. Go to www.obesitycareproviders.com       I recommend self monitoring to support behavior change and to learn how your environment individually impacts YOUR body. This will help promote intuitive eating practices. Goal is to track nutrition 2x/week via paper log or using an michell such as Smart GPS Backpack (www.Metaset.com) or LoseIT!. I also advise checking and logging a weekly home scale weight. Support via our counseling and dietician teams are available with a referral. Please let me know if this individualized care is desired.      Self-Monitoring - The Way to Successful Weight Management    By Demetra Stephens RD, LDN, Ruthy Zambrano RD, LDN, Jaspreet Sánchez PA-C, and Noemi Kamara MD    [facility] www.obesityaction.org Winter 2008 Resource    One major and possibly most important behavioral interventional strategy for weight management and lifestyle change is self-monitoring. Behavioral interventions are a central aspect in treatments to promote lifestyle changes that lead to weight-loss, prevent weight gain or weight regain and improve physical fitness. In the past, self-monitoring has unfortunately been one of the least popular techniques for those in weight management programs, and in some cases it is even thought of as a punishment. Because self-monitoring is critical for success with lifestyle changes, it is important to look at the various self monitoring techniques.    What  is Self-monitoring?  Self-monitoring refers to the observing and recording of eating and exercise patterns, followed by feedback on the behaviors. The goal of self-monitoring is to increase self-awareness of target behaviors and outcomes, thus it can serve as an early warning system if problems are arising and can help track success. Some commonly used self-monitoring techniques include:    Food diaries  Regular self-weighing  Exercise logs  Equipment such as pedometers, accelerometers and metabolic devices  Food Logs and Diaries  One of the most common and important types of self-monitoring strategies in weight management programs is keeping a food log, in which individuals record foods, exercises or beverages as soon as they are consumed.    One important technique with food logs is individuals recording what they eat or drink as it is consumed; otherwise it may not give an accurate account of the day’s intake. A good “rule of thumb” for food logs is: “if you bite it, you write it!”    The minimum information for weight-loss that should be kept in food logs is type, amount and caloric content of food or beverage consumed. This provides the ability to track and balance the number of calories consumed throughout the day with the amount of calories expended throughout the day.    Other nutritional information that can be logged includes: time of day of eating, fat content and carbohydrate grams. Disease-specific food logs can also be kept. For example: focusing on carbohydrate content instead of calories for patients with diabetes or insulin resistance.    Food Diaries  Another helpful tool in self-monitoring is keeping a food diary. Food diaries differ from food logs because they include more detailed information. They are useful if you are trying to find behavioral reasons or psychological aspects for eating.    Depending on the person and behavioral complexities involved, some food diaries could include the stress  level, mood or feelings surrounding eating, activity or location or other environmental or emotional triggers for eating. The more complex or detailed, the better the feedback.    However, in today’s society it is almost impossible for most people to keep highly detailed daily food records over the long-term, therefore, compliance is often very low with detailed food diaries. By suggesting that patients keep a detailed food record for a few days each week, perhaps major areas of focus for nutritional and behavioral intervention can be recognized.    Logging Your Food Online  Online food logs and diaries or computer software are quick and convenient ways to keep records of foods consumed in our technologically advanced world. Many Web sites are available for tracking of foods and calories throughout the day, some of which are free and very easy to use.    You can look up food choices and/or alternative choices in online databases of more than 50,000 foods. Internet-savvy loggers may choose to keep their journals online. Others may just choose to use these databases as a more convenient way of looking up nutritional value of foods. Some free online diaries include:    Free Web sites for searching nutritional information are available, an example is www.[employer]. These Web sites may also offer exercise tracking and ideas, support, motivational tips and chat or discussion rooms.    Hand-held Calorie Counters  Another option for those who are “on the go” are handheld devices for calorie counting. Some of the devices are stand-alone such as CalorieSmart® or HealthFitCounter®. Others need to connect to Web sites. Other devices are installed in your Palm or Pocket-PC such as Diet Diary by [employer]. They let you download updates when nutrition facts change, however, some of them use a lot of memory.    Regular Weighing  Weighing yourself is an important and simple self-monitoring behavior to serve as reminder of  one’s eating and physical activity habits. Although it may be hard and sometimes discouraging to weigh yourself while losing weight, it is recommended to weigh yourself weekly, preferably outside of the home on the same scale.    Using the scale at the local gym or exercise facility or your doctor’s office may be more accurate than home scales. However, if this is unrealistic, it is okay to use a home scale. Try to weigh yourself at the same time of day and the same day of the week.    Writing down your weekly weights on a table, graph or calendar can help you keep track of your success or to help you get back on track more quickly. It is important to note that weighing yourself more frequently than weekly is not recommended, as day to day fluctuations are not indicators of actual weight. Regular monitoring of your weight is also essential to help you maintain your weight after losing weight.    Exercise Logs  Another self-monitoring technique, along the same lines as food logs and diaries, is keeping an exercise log or diary. The number of minutes engaged and type and level of exertion of physical activity should ideally be recorded.    An important and often forgotten aspect of exercise logs is the level of perceived exertion. Walking for 30 minutes, at an easy compared to a hard pace, will result in different levels of calories burned and cardiovascular impact.    Typically, an easy physical activity that does not increase heart rate much, or alter breathing would usually be the pace that you walk around work or go shopping. Moderate level of physical exertion is when you are getting a mildly increased heart and breathing rate. Heavy or hard level of physical exertion would be sweating, increased heart rate (target heart rate range) as well as increased breathing.    Remember, physical activity can be done at one time or intermittently throughout the day. Logging exercise can be a positive feedback or a reminder  to incorporate more exercise or physical activity into your daily routine.    Initial activities may be walking, riding a stationary bike or swimming at a slow pace. Other types of exercise that can be fun are dancing, exercise videos or chair exercises. You should try to aim for 30 minutes of exercise on most days of the week.    Many people try to start out with exercise on three or four days of the week. However, if you can get yourself exercising most to all days of the week, even if only for 10 or 15 minutes, it will become more of a routine for you.    Healthy Lifestyle Tip  All adults should set a long-term goal to accumulate at least 30 minutes or more of moderate-intensity physical activity on most to all days of the week. Also, try to increase activities of daily living such as taking the stairs instead of the elevator, parking further away or walking to a bathroom that is further from your desk. Reducing sedentary time is a good strategy to increase activity by undertaking frequent, less strenuous activities. With time, you may be able to engage in more strenuous activities.    Pedometers  Self-monitoring tools are becoming more and more popular and accurate. One of the simplest of these self-monitoring tools is a pedometer. Pedometers give objective data of physical activity throughout the day. Pedometers can be found in almost any consumer catalog or retail store.    Some of the more popular manufactures include Digi-Walker, [employer], Elepago, [employer], [employer], [employer], Freestyle, [employer], [employer] and many others. [employer] and [employer] make pedometer of speedometer devices that calculate steps and speed using GPS. These clip-on devices are inexpensive, ranging from less than $15 up to $75.    Many people get an average of 3,000 steps per day with daily activity. In order to burn off extra calories for weight-loss, walking 10,000 steps per day is recommended. For regular health, a minimum of  6,000 steps per day is required. Research suggests that a deliberate walk of 4,000-6,000 steps will help with weight-loss. It is often a good idea to keep track of your daily steps taken in your exercise log.    Pedometers can be frustrating for those who are more interested in distance traveled. Focusing on the number of steps and ways to incorporate more steps throughout the day will make as much of a difference with weight-loss as actual distance does. Pedometers encourage people to find ways to add more steps throughout the day.    Because step counting is becoming more popular, advances are being made in the technology behind pedometers. New pedometers display steps and count them accurately. They are meant to be worn everyday and all day, as motivation to keep stepping, Most are small and comfortable to wear.    Pedometers sense your body motion, counting your footsteps usually by a turned pendulum technology, a coiled spring mechanism and a hairspring mechanism (which is the least accurate). The unit should be accurate in its count when you wear it correctly. You may need to experiment with where to wear it. You can measure your stride and then the pedometer can estimate distance traveled.    Some pedometers today offer multifunction options like calorie estimates, clocks, timers, stopwatches, speed estimators, seven-day memory or pulse rate readers, voice feedback and radios.    Accelerometers  Although pedometers are very cost-effective, one of the main flaws in using pedometers, however, is that they do not record intensity (how hard) or duration (how long) or frequency (how often) movement occurs. Accelerometers are devices that can objectively measure frequency, duration and intensity of physical activity.    Accelerometers provide a high level of accuracy when assessing physical activity. There are a variety of commercially available accelerometers, or activity monitors, which come in a wide range of  prices anywhere from $50 to $1,000. [employer] and [employer] are examples of affordable accelerometers.    Many of the more expensive accelerometers are used only in research or as a part of a hospital-based program. These monitors are more complex than pedometers in that they display and store more complex data. Some are designed to download to a computer for analysis of intensity levels, movements and physical activity patterns. They can also be used to estimate calories burned or energy expenditure.    Accelerometers have sophisticated sensors that convert physical movement into an electrical signal that is relative to the muscular force needed to produce the work. Accelerometers can be found in uniaxial or triaxial measures. Uniaxial accelerometers measure in a single plane and can be attached to the trunk or limbs. Triaxial accelerometers measure along three planes: vertical, medial-lateral and anterior-posterior.    Although accelerometers are a step up from pedometers in accuracy of physical activity, they cannot register resistance. Therefore, if you are strength training or adding resistance to your bike or treadmill or adding an incline to your walking, it will not be able to discern the added level of energy required to do that work.    Metabolic Devices  One of the most accurate and most expensive tools for self-monitoring are tools that have very sophisticated monitoring and interpreting sensors for calories burned. Many of these devices have options to subscribe to a Web-based calorie counter system that integrates the amount of calories burned measured by the equipment and your calories consumed that you enter in easy to use food logs.    These devices are more accurate in measurements of calories expended because they use not only accelerometer technology, but also heat flux sensors, galvanic skin response (to measure physical exertion and emotional stimuli) and skin temperature gauges. Some also include  heart rate monitoring techniques. All of these technologies combined lead to a very accurate measurement of calories expended throughout the day.    These devices can determine if you are sitting, sleeping, jogging, walking, lifting weights or riding in a car. Many of these devices are very expensive and used primarily for research, however, some are available commercially.    This technology is also employed by hospital-based programs. Patients wear the hospital’s armband and track their nutrition on the Web site or computer-based program for typically one to two weeks. When they return to the clinic, the information will be uploaded and the practitioners will be able to work with the patients with objective data on metabolic lifestyle patterns.    Practitioners can also monitor patients on integrated software applications to provide consultations without being face to face. Practitioners have the ability to set daily goals to tailor programs to the individual patient. These are great tools to help objectively monitor behavior and physical activity, as well as providing real time feed back to the patient. [employer] is one company that offers this technology.    Conclusion  Although specific diseases and treatments vary, behavior modification is the major key in weight-loss or prevention and decreases the risk of diseases. Self-monitoring is a key to behavior modifications, and there are a multitude of ways to self-monitor. With technology advancements, self-monitoring techniques are changing and improving to help defeat some of the major barriers to compliance. The bottom line is that no matter how you do it, self-monitoring should be an important part of your weight-loss, weight maintenance or healthy lifestyle change. Then, the next step is to be sure the self-monitoring translates into positive behavior changes with regards to diet and exercise.      Staying Healthy While Traveling    by Abiola Pena RD    [facility]  at www.obesityaction.org Summer 2023 Resource    It’s time to go on a vacation! Summertime is a popular season for travel, and vacations are a wonderful way to unwind, discover new places, and spend time with family and friends. Taking a break from your daily routine is really important. When you return, you’ll feel refreshed and ready for the next things you need to do! As you pack your bags and plan your trip, remember to stay focused on your health and wellness goals. With a bit of planning, it’s not hard to stay active, eat well, and have a great time during your vacation.    Do Your Research and Plan Ahead  Keeping yourself on track during a vacation might feel like an impossible challenge. However, with the right preparation, it’s entirely achievable. Before you leave, take some time to plan your vacation. Look for ways to include health and nutrition in your itinerary, such as trying new activities and foods. You’ll be pleasantly surprised to find that many places have great options for staying healthy.    Check your Nutrition  Instead of constantly eating sweets, fried foods and junk, seek out healthier alternatives. Choosing nutritious foods while traveling has many advantages. First, eating healthy will give you more energy, which is important for having a fun vacation. Second, sticking to your plan will give you a sense of accomplishment and satisfaction. Here are some tips to help you choose wisely and prioritize your well-being.    Consider using grocery delivery services. You can shop online from home and have meals and snacks delivered to your hotel. Just make sure your room has a refrigerator. This way, you won’t have to search for a store and can avoid the temptation of unhealthy vacation treats. You can stock up on fresh produce, cheese sticks, popcorn and protein bars to keep in your hotel room.    Make eating out fun by finding new restaurants to try at your destination. Take a moment to look up  their menus online and come up with a plan. You can plan to try fresh fish at a local beach restaurant, visit a steakhouse and choose a lean cut of meat with a side salad, or enjoy chicken fajitas at a Mexican restaurant.  Remember to give yourself a break. It’s okay to indulge in a special meal or snack during your vacation. One treat doesn’t mean the whole day is ruined. Just get back on track with your healthy eating at the next meal.    Finding Fitness  Discovering fun fitness activities can help you build a habit that you’ll want to continue. When you’re on vacation, make it a point to set aside time to move your body. This will not only increase your energy levels but also make you feel positive and satisfied about being active. Be creative and think outside the box to come up with exciting ideas to stay active during your vacation!    Try to find ways to move throughout the day. Look for trails in a local park, a gym at your hotel, or join an exercise class for the day. Keep things interesting by varying your activities and trying something new.  Involve the whole family in staying active. Rent bikes for an afternoon, plan a walk after dinner, or try something different like surfing! You can also let your kids choose an activity. You might be surprised to hear what ideas they have!    Mind Your Mental Health  Vacations are meant to be relaxing and a chance to recharge. Make sure you have a vacation that helps you rest and rejuvenate! Sometimes it’s easy to plan too many activities and take on too many commitments. Take a moment to find balance between fun and fitness.    Pack items you enjoy. Bring your favorite books, puzzles, music, or whatever you love to ensure you have time to do just that. Sneaking away for a few minutes can give you time to spend on your favorite things.  Remember to rest. Vacations can be full of exciting things, but it’s also important to take it easy. Take an afternoon nap or find  some quiet time alone to recharge.    Include activities that you enjoy. Vacations offer a lot of things to do, so make sure you have activities that you personally enjoy along with the rest of the family.    How to: Make it Through a Long Day of Travel  Whether you’re taking a road trip for a few hours or a plane across the [place] travel days require some planning. Here are some tips to make your trip easier.    Pack a snack bag for long road trips or airport days. When in the car, consider bringing a cooler with water to avoid sugary snacks at the gas stations. Pack fresh fruits, vegetables, water, cheese and meats. Include snacks like popcorn, pretzels or whole-grain crackers. Airline travel can be more challenging, but you can pack snacks such as protein bars, trail mix or popcorn in your carry-on. Bring a refillable water bottle to stay hydrated throughout the day!    Find ways to walk, even on your busiest travel day. If you’re driving, take quick 10-15-minute walks every couple of hours. It will not only give you some cardio exercise but also reduce stress and improve your mood during long car rides. If you’re stuck in an airport, walk up and down the terminal while you wait.  Bring items like books, adult coloring books or knitting supplies to keep busy and reduce screen time. This can be a great opportunity to explore a new hobby and let your creative juices flow.  How to: Make Healthy Food Choices When Eating Out  Making healthy food choices while eating out on vacation can be challenging.    Instead of donuts and pastries for breakfast, go for high-protein items like eggs, turkey sausage, turkey gonzalez, oats and whole-grain toast. Ask for fresh fruit or low-fat yogurt on the side to make breakfast even more nutritious.  Restaurant portions can be large. If someone in your group is willing to split a meal, that can be an easy solution. Since you might not have enough space to take leftovers with you, you may  have to leave some behind.  When looking at the lunch or dinner menu, choose grilled, baked or boiled options. This can save you a lot of calories and fat. Be mindful of side dishes, as they can add up quickly. Consider swapping a high-calorie side item for a side salad, fruit or vegetables.    Desserts can be tempting, but plan ahead if you want to order one. Choose a lower-calorie meal to balance it out. You can also share the dessert with others at your table to enjoy a smaller portion.  How to: Deal With an unexpected change of plans  We’ve all been there. Your perfectly planned vacation goes sideways. How do you make the most of these days? It can be easy to throw in the towel, but always be ready with a backup plan.    The weather can change unexpectedly. A rainy day might ruin your beach or mountain hike plans. Take on the challenge and find new ways to have fun. Look for an indoor pool or an activity center to keep working towards your goals!    Your carefully chosen restaurant may not have healthy options, or your grocery store delivery might be late. Don’t give up; just adapt and make a new decision. Make the best choice you can and move on to the next meal. It’s okay if it’s not perfect!    Sometimes your perfectly planned day doesn’t go as expected. Kids may start fighting during a trip to the park, or someone might get sick on vacation. Focus on the positive moments and enjoy the time you have.  Wherever you go on your summer vacation, make sure to enjoy the break, relaxation and adventure. Taking time away from your usual routine can be amazing, and keeping up with your health and fitness goals can make your vacation even better.      Healthy Tips for Eating Out  Choices  It’s not easy to change the habits of a lifetime. Experts say that it can take 6 months just to change one old habit for a healthier one. That’s why gradual change is so important. These tips are reminders of the dozens of small ways  you can change your habits when eating out. Don’t expect to follow each tip all the time. Think of the tips as options for handling situations that may have given you trouble in the past.  You don't have to give up eating out to cut down on fat, cholesterol, and salt. You just need to think about what you order. Many menus highlight low-fat and low-sodium dishes. But if you can't find what you want, ask. Explain what you need to the  or . Or ask to see printed nutrition information.    Ask for what you want  Ask that foods be prepared with little or no fat and with no added salt.  Ask that sauces be left off or served on the side. Choose sauces made with tomato instead of with cream or cheese.  Ask for steamed rice or a baked or boiled potato, without butter or sour cream.  Ask that vegetables be steamed and served with no butter or sauce. Ask for lemon juice or vinegar to sprinkle on them for flavor.  Keep these tips in mind  Choose minestrone or vegetable soups. Ask about sodium content.  Order salad dressing on the side. Dip your fork in the dressing, then in the salad.  Look for fish, chicken, turkey, or meat that is broiled, roasted, poached, or steamed.  Order 1 or 2 low-fat appetizers or soup and a salad instead of a main dish. Or eat only half of the main dish and take the rest home.  If you want a dessert, try fresh fruit, nonfat yogurt, or sorbet. Or share a dessert.  Fast food tips  Choose grilled chicken sandwiches and plain hamburgers. Add vegetables to your burgers and sandwiches and go lightly on creamy sauces like mayonnaise and tartar sauce. Choose whole-grain buns or bread when available.  Remove the skin from fried chicken and choose mashed potatoes, corn on the cob, and baked beans on the side.  Order a broiled chicken salad and pile on fresh vegetables from the salad bar. Use a small amount of low-fat dressing.  Top a baked potato with cottage cheese and vegetables from the salad  bar.  Ask for a pizza topped with vegetables.  Watch portion size. Order regular size meals, not super-sized. A kid's meal may be enough and may have healthier options for sides.  Stay away from milkshakes, sugary sodas, and sweetened drinks. Instead choose low-fat or skim milk, water, unsweetened ice tea, and sparkling water.  Restaurant tips  Plan ahead. Decide what you will eat before you get to the restaurant. If you plan to eat high-fat foods, choose low-fat foods during the rest of the day.  Don’t be afraid to ask how foods are prepared and whether they can be done without high-fat ingredients, such as cream, butter, cheese, and oil.  Ask for sauces and salad dressings on the side and use just a tablespoon. Ask for low-fat dressings.  Try starting your meal with a bowl of vegetable soup or a salad. This may help to prevent you from overeating during the meal.  Order meat, poultry, and fish broiled, grilled, baked, poached, or steamed. Always remove the skin from chicken.  Choose Mexican dishes made with soft, rather than crispy tortillas. For toppings, use salsa and lettuce, rather than sour cream and cheese.  For dessert, enjoy fruit, sorbet, or low-fat frozen yogurt. Share a rich dessert with friends.  Make a meal out of a low-fat appetizer (such as shrimp cocktail or fresh pasta), bread, and salad as your main meal. Ask for an appetizer-size portion of an entree.    Foods to avoid  Donuts, muffins, and pastries  Coconut, vegetables with butter, cream, or cheese sauce  Cream, whole milk, and powdered creamers  Stack, liver, luncheon meats, ground meat, and canned fish in oil  Sweets and foods made with butter, coconut or palm oil, or hydrogenated fats    © 4450-4309 [employer]. [address] [place] [address], [place] [address]. All rights reserved. This information is not intended as a substitute for professional medical care. Always follow your healthcare professional's instructions.

## 2025-07-18 ENCOUNTER — LAB ENCOUNTER (OUTPATIENT)
Dept: LAB | Age: 50
End: 2025-07-18
Attending: INTERNAL MEDICINE
Payer: COMMERCIAL

## 2025-07-18 ENCOUNTER — OFFICE VISIT (OUTPATIENT)
Dept: INTERNAL MEDICINE CLINIC | Facility: CLINIC | Age: 50
End: 2025-07-18
Payer: COMMERCIAL

## 2025-07-18 VITALS
HEART RATE: 74 BPM | RESPIRATION RATE: 16 BRPM | WEIGHT: 158.63 LBS | BODY MASS INDEX: 26.75 KG/M2 | SYSTOLIC BLOOD PRESSURE: 116 MMHG | DIASTOLIC BLOOD PRESSURE: 74 MMHG | OXYGEN SATURATION: 100 % | HEIGHT: 64.57 IN | TEMPERATURE: 97 F

## 2025-07-18 DIAGNOSIS — E03.8 OTHER SPECIFIED HYPOTHYROIDISM: ICD-10-CM

## 2025-07-18 DIAGNOSIS — S62.501A FRACTURE OF UNSPECIFIED PHALANX OF RIGHT THUMB, INITIAL ENCOUNTER FOR CLOSED FRACTURE: ICD-10-CM

## 2025-07-18 DIAGNOSIS — G43.109 MIGRAINE WITH AURA AND WITHOUT STATUS MIGRAINOSUS, NOT INTRACTABLE: ICD-10-CM

## 2025-07-18 DIAGNOSIS — L65.9 HAIR LOSS: ICD-10-CM

## 2025-07-18 DIAGNOSIS — Z00.00 ROUTINE GENERAL MEDICAL EXAMINATION AT A HEALTH CARE FACILITY: Primary | ICD-10-CM

## 2025-07-18 DIAGNOSIS — R73.9 BLOOD GLUCOSE ELEVATED: ICD-10-CM

## 2025-07-18 DIAGNOSIS — M24.9: ICD-10-CM

## 2025-07-18 PROCEDURE — 83540 ASSAY OF IRON: CPT

## 2025-07-18 PROCEDURE — 83036 HEMOGLOBIN GLYCOSYLATED A1C: CPT

## 2025-07-18 PROCEDURE — 36415 COLL VENOUS BLD VENIPUNCTURE: CPT

## 2025-07-18 PROCEDURE — 82728 ASSAY OF FERRITIN: CPT

## 2025-07-18 PROCEDURE — 83550 IRON BINDING TEST: CPT

## 2025-07-18 RX ORDER — SUMATRIPTAN SUCCINATE 25 MG/1
TABLET ORAL
Qty: 9 TABLET | Refills: 3 | Status: SHIPPED | OUTPATIENT
Start: 2025-07-18

## 2025-07-18 RX ORDER — LEVOTHYROXINE SODIUM 75 UG/1
75 TABLET ORAL
Qty: 90 TABLET | Refills: 3 | Status: SHIPPED | OUTPATIENT
Start: 2025-07-18

## 2025-07-18 NOTE — PROGRESS NOTES
The following individual(s) verbally consented to be recorded using ambient AI listening technology and understand that they can each withdraw their consent to this listening technology at any point by asking the clinician to turn off or pause the recording:    Patient name: Lorelei Clemente  Subjective:   Lorelei Clemente is a 49 year old female who presents for Physical (ES rm - 3 -  Reviewed Preventative/Wellness form with patient. )       History/Other:   History of Present Illness  Lorelei Clemente is a 49 year old  patient here for CPE. She is moving out of state soon due to her 's work.   She has been experiencing hair loss since March. It is generalized thinning, noted especially when brushing her hair. No patches of hair loss. Nothing tried.  She has a history of thyroid issues and is currently on levothyroxine 75 mcg, effectively managing her condition with a recent TSH level of 1.9. She denies symptoms such as fatigue associated with thyroid dysfunction.  She underwent a hysterectomy for benign cause and is up to date on mammogram. Follows with Dr. 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  She has h/o pre dm, watches diet. Did not tolerate metformin. She would like to check hgba1c today She underwent an abdominoplasty in February, which she is happy with. No post op concerns. She is up to date on colonoscopy and vaccinations      Chief Complaint Reviewed and Verified  Nursing Notes Reviewed and   Verified  Tobacco Reviewed  Allergies Reviewed  Medications Reviewed    Social History Reviewed         Tobacco:  She has never smoked tobacco.    Current Medications[1]      Review of Systems:  Review of Systems  Stress with moving, hair thinning.   No CP/palp/SOB  migraines doing ok    Objective:   /74 (BP Location: Left arm, Patient Position: Sitting, Cuff Size: large)   Pulse 74   Temp 97.4 °F (36.3 °C)  (Temporal)   Resp 16   Ht 5' 4.57\" (1.64 m)   Wt 158 lb 9.6 oz (71.9 kg)   LMP 05/28/2012 (LMP Unknown)   SpO2 100%   BMI 26.75 kg/m²  Estimated body mass index is 26.75 kg/m² as calculated from the following:    Height as of this encounter: 5' 4.57\" (1.64 m).    Weight as of this encounter: 158 lb 9.6 oz (71.9 kg).  Physical Exam  Gen: NAD, appears stated age  HEENT: PERRL, EOMI, OP-clear, TMs- WNL  NECK: supple, no thyromegaly or JVD  CV: Regular, nl S1 S2  RESP: Clear to auscultation bilaterally  ABD: soft, NT, ND, +BS  EXT: no clubbing, cyanosis, or edema  NEURO: Alert and oriented    Results        Assessment & Plan:   1. Routine general medical examination at a health care facility (Primary)- she is up to date on mammogram, pap not indicated s/p hysterectomy. She is up to date on colonoscopy. Advised on healthy diet and regular exercise  2. Hair loss-Significant hair loss since March, potentially related to stress or iron deficiency. Dermatology consultation may be needed if hair loss persists.  - Order iron studies  - Recommend over-the-counter biotin supplement  - Advise dermatology follow-up if hair loss persists      3. Other specified hypothyroidism -Hypothyroidism is well-managed with levothyroxine 75 mcg. Recent TSH level is 1.9, indicating good control.    4. Migraine with aura and without status migrainosus, not intractable - controlled, CPM   -     SUMAtriptan Succinate; TAKE 1 TABLET BY MOUTH AS NEEDED FOR MIGRAINE. MAY REPEAT IN 2 HOURS AS NEEDED(MAX 2 TABLETS PER DAY)  Dispense: 9 tablet; Refill: 3  5. Blood glucose elevated-Prediabetes with previous metformin intolerance. Discussed low carb diet. Check hgba1c today  6. Other- patient moving out of state in 1 month  Assessment & Plan         Return if symptoms worsen or fail to improve, for please let me know if anything needed with move, good luck with everything!.      Fozia Thibodeaux MD, 7/18/2025, 12:31 PM               [1]   Current  Outpatient Medications   Medication Sig Dispense Refill    levothyroxine 75 MCG Oral Tab Take 1 tablet (75 mcg total) by mouth before breakfast. 90 tablet 3    SUMAtriptan 25 MG Oral Tab TAKE 1 TABLET BY MOUTH AS NEEDED FOR MIGRAINE. MAY REPEAT IN 2 HOURS AS NEEDED(MAX 2 TABLETS PER DAY) 9 tablet 3    topiramate 100 MG Oral Tab Take 1 tablet (100 mg total) by mouth 2 (two) times daily. 180 tablet 1    Phentermine HCl 15 MG Oral Cap Take 1 capsule (15 mg total) by mouth every morning. 90 capsule 1    HYDROcodone-acetaminophen 5-325 MG Oral Tab Take 1-2 tablets by mouth every 6 (six) hours as needed for Pain. (Patient not taking: Reported on 7/18/2025) 20 tablet 0    ergocalciferol 1.25 MG (08989 UT) Oral Cap Take 1 capsule (50,000 Units total) by mouth once a week. With food (Patient not taking: Reported on 7/18/2025) 12 capsule 1    diclofenac (VOLTAREN) 1 % External Gel Apply 4 g topically every 6 (six) hours as needed. (Patient not taking: Reported on 7/18/2025) 1 each 2

## 2025-07-19 LAB
EST. AVERAGE GLUCOSE BLD GHB EST-MCNC: 131 MG/DL (ref 68–126)
HBA1C MFR BLD: 6.2 % (ref ?–5.7)

## 2025-07-21 ENCOUNTER — LAB ENCOUNTER (OUTPATIENT)
Dept: LAB | Facility: HOSPITAL | Age: 50
End: 2025-07-21
Attending: INTERNAL MEDICINE
Payer: COMMERCIAL

## 2025-07-21 DIAGNOSIS — L65.9 HAIR LOSS: ICD-10-CM

## 2025-07-21 DIAGNOSIS — R73.9 BLOOD GLUCOSE ELEVATED: ICD-10-CM

## 2025-07-21 LAB
DEPRECATED HBV CORE AB SER IA-ACNC: 29 NG/ML (ref 50–306)
IRON SATN MFR SERPL: 13 % (ref 15–50)
IRON SERPL-MCNC: 42 UG/DL (ref 50–170)
TOTAL IRON BINDING CAPACITY: 335 UG/DL (ref 250–425)
TRANSFERRIN SERPL-MCNC: 277 MG/DL (ref 250–380)

## 2025-07-21 PROCEDURE — 83540 ASSAY OF IRON: CPT

## 2025-07-21 PROCEDURE — 82728 ASSAY OF FERRITIN: CPT

## 2025-07-21 PROCEDURE — 36415 COLL VENOUS BLD VENIPUNCTURE: CPT

## 2025-07-21 PROCEDURE — 83550 IRON BINDING TEST: CPT

## 2025-07-21 RX ORDER — DICLOFENAC SODIUM 75 MG/1
75 TABLET, DELAYED RELEASE ORAL 2 TIMES DAILY
Qty: 28 TABLET | Refills: 1 | OUTPATIENT
Start: 2025-07-21

## 2025-08-04 ENCOUNTER — TELEPHONE (OUTPATIENT)
Dept: INTERNAL MEDICINE CLINIC | Facility: CLINIC | Age: 50
End: 2025-08-04

## (undated) DIAGNOSIS — F90.0 ATTENTION DEFICIT HYPERACTIVITY DISORDER (ADHD), PREDOMINANTLY INATTENTIVE TYPE: ICD-10-CM

## (undated) DIAGNOSIS — F32.A ANXIETY AND DEPRESSION: ICD-10-CM

## (undated) DIAGNOSIS — F41.9 ANXIETY AND DEPRESSION: ICD-10-CM

## (undated) DEVICE — DRAPE TAPE: Brand: CONVERTORS

## (undated) DEVICE — SUT MCRYL 4-0 18IN PS-2 ABSRB UD 19MM 3/8 CIR

## (undated) DEVICE — GLOVE SUR 6.5 SENSICARE PI PIP CRM PWD F

## (undated) DEVICE — DRAPE,TAPE STRIPS,STERILE: Brand: MEDLINE

## (undated) DEVICE — SUT COAT VCRL 3-0 27IN SH ABSRB UD 26MM 1/2

## (undated) DEVICE — COVER LT HNDL RIG FOR SUR CAM DISP

## (undated) DEVICE — BREAST-HERNIA-PORT CDS-LF: Brand: MEDLINE INDUSTRIES, INC.

## (undated) DEVICE — CLIP LIG M BLU TI HRT SHP WIRE HORZ

## (undated) DEVICE — SOLUTION IRRIG 1000ML 0.9% NACL USP BTL

## (undated) DEVICE — GOWN,SIRUS,FABRIC-REINFORCED,LARGE: Brand: MEDLINE

## (undated) DEVICE — SLEEVE COMPR M KNEE LEN SGL USE KENDALL SCD

## (undated) DEVICE — CLIP SUR SM TI HRT SHP WIRE HORZ LIG SYS

## (undated) DEVICE — Device

## (undated) DEVICE — SUT PERMA- 2-0 18IN FS NABSRB BLK 26MM 3/8

## (undated) DEVICE — SLEEVE COMPR MD KNEE LEN SGL USE KENDALL SCD

## (undated) DEVICE — GOWN,SIRUS,FABRNF,L,20/CS: Brand: MEDLINE

## (undated) DEVICE — DRAPE PACK CHEST

## (undated) DEVICE — INSULATED BLADE ELECTRODE: Brand: EDGE

## (undated) DEVICE — WECK HORIZON SMALL YELLOW CLIP DISP

## (undated) DEVICE — 3M™ STERI-DRAPE™ INSTRUMENT POUCH 1018: Brand: STERI-DRAPE™

## (undated) DEVICE — ANTIBACTERIAL UNDYED BRAIDED (POLYGLACTIN 910), SYNTHETIC ABSORBABLE SUTURE: Brand: COATED VICRYL

## (undated) DEVICE — UNDERPAD INCONT W23XL36IN STD BLU POLYPR BK

## (undated) DEVICE — PAD,ABDOMINAL,8"X7.5",ST,LF,20/BX: Brand: MEDLINE INDUSTRIES, INC.

## (undated) DEVICE — DRAPE PACK CHEST AND U BAR

## (undated) DEVICE — ELECTRODE ES 2.75IN PTFE BLDE MOD E-Z CLN

## (undated) DEVICE — WECK HORIZON MED BLUE CLIP DISP

## (undated) NOTE — Clinical Note
Thank you for referring Lorelei to the [facility]. Consult was completed today via person.  I have ordered labs, referred for a nutrition consultation with our dietician.  I counseled on the importance of lifestyle intervention in adjunct with medication and started Topamax and Phentermine for treatment with follow-up advised in about 4 months.

## (undated) NOTE — LETTER
Date: 6/14/2024    Patient Name: Lorelei Clemente          To Whom it may concern:    This letter has been written at the patient's request. The above patient was seen at [facility] for treatment of a medical condition.    This patient should be excused from attending work until further notice. Tentative return to work in 4-6 weeks.      Sincerely,        Nba Driscoll DO, CAROLIN   Primary Care Sports Medicine    Department of Orthopaedic Surgery  [facility]    [address] [place] [id number]  [address] [place] [address]    t: [phone]  f: [phone]      [facility]

## (undated) NOTE — LETTER
04/02/21        [facility]   [address]      Dear Diomedes Luong ,    [facility] records indicate that you have outstanding lab work and or testing that was ordered for you and has not yet been completed:  Orders Placed This Encou

## (undated) NOTE — LETTER
OUTSIDE TESTING RESULT REQUEST     IMPORTANT: FOR YOUR IMMEDIATE ATTENTION  Please FAX all test results listed below to: [phone]     Testing already done on or about: Appt on  with you     * * * * If testing is NOT complete, arrange with patient A.S.A.P. * * * *      Patient Name: Lorelei Parks  Surgery Date: 3/7/2024  Medical Record: [id number]  CSN: [id number]  : 1975 - A: 48 y     Sex: female  Surgeon(s):  Blanca Martell MD  Procedure: Left breast 2 site wire localized excisional biopsy  Anesthesia Type: MAC     Surgeon: Blanca Martell MD     The following Testing and Time Line are REQUIRED PER ANESTHESIA     EKG READ AND SIGNED WITHIN   90 days  BMP (requires 4 hour fast) within  90 days  AND  MEDICAL CLEARANCE      Thank You,   Sent by:ASHUTOSH ALCANTARA

## (undated) NOTE — LETTER
01/14/20        Ronda Gore   [address] [facility] [address]      Dear Jessi Stephenson ,    [facility] records indicate that you have outstanding lab work and or testing that was ordered for you and has not yet been completed:  Orders Placed This Encounter

## (undated) NOTE — LETTER
To:  Dr Thibodeaux  Date:  2/15/2024  Patient Name: Lorelei Parks  -Age / Sex: 1975-A: 48 y  female  Medical Records: [id number]   CSN: [id number]      Clearance for Surgery Requested by Surgeon    Request for:  Medical Clearance    Requested by Surgeon: Dr Martell    Surgical Date: 3/7/2024    Procedure: Left breast 2 site wire localized excisional biopsy, Left      Please fax the clearance note to the Pre-Admission Testing department.  Thank you.  Tere RN - pt has appt with you on

## (undated) NOTE — LETTER
Date: 6/27/2024    Patient Name: Lorelei Clemente          To Whom it may concern:    This letter has been written at the patient's request. The above patient was seen at [facility] for treatment of a medical condition.    This patient should be excused from attending work until further notice. Tentative return to work in 6 weeks due to the nature of her injury.      Sincerely,        Nba Driscoll DO, CAQSM   Primary Care Sports Medicine    Department of Orthopaedic Surgery  [facility]    [address] [place] [address]  [facility] [place] [address]    t: [phone]  f: [phone]      [facility]